# Patient Record
Sex: FEMALE | Race: BLACK OR AFRICAN AMERICAN | Employment: OTHER | URBAN - METROPOLITAN AREA
[De-identification: names, ages, dates, MRNs, and addresses within clinical notes are randomized per-mention and may not be internally consistent; named-entity substitution may affect disease eponyms.]

---

## 2017-02-06 ENCOUNTER — HOSPITAL ENCOUNTER (EMERGENCY)
Age: 37
Discharge: HOME | End: 2017-02-06
Attending: EMERGENCY MEDICINE | Admitting: EMERGENCY MEDICINE
Payer: COMMERCIAL

## 2017-02-06 VITALS
BODY MASS INDEX: 42.22 KG/M2 | HEART RATE: 74 BPM | SYSTOLIC BLOOD PRESSURE: 128 MMHG | WEIGHT: 269 LBS | DIASTOLIC BLOOD PRESSURE: 74 MMHG | TEMPERATURE: 98 F | OXYGEN SATURATION: 100 % | RESPIRATION RATE: 20 BRPM | HEIGHT: 67 IN

## 2017-02-06 DIAGNOSIS — M54.50 ACUTE BILATERAL LOW BACK PAIN WITHOUT SCIATICA: Primary | ICD-10-CM

## 2017-02-06 LAB
APPEARANCE UR: NORMAL
BILIRUB UR QL: NEGATIVE
COLOR UR: YELLOW
GLUCOSE UR STRIP.AUTO-MCNC: NEGATIVE MG/DL
HCG UR QL: NEGATIVE
HGB UR QL STRIP: NEGATIVE
KETONES UR QL STRIP.AUTO: NEGATIVE MG/DL
LEUKOCYTE ESTERASE UR QL STRIP.AUTO: NEGATIVE
NITRITE UR QL STRIP.AUTO: NEGATIVE
PH UR STRIP: 7.5 [PH] (ref 5–8)
PROT UR STRIP-MCNC: NEGATIVE MG/DL
SP GR UR REFRACTOMETRY: 1.02 (ref 1–1.03)
UROBILINOGEN UR QL STRIP.AUTO: 1 EU/DL (ref 0.2–1)

## 2017-02-06 PROCEDURE — 99283 EMERGENCY DEPT VISIT LOW MDM: CPT

## 2017-02-06 PROCEDURE — 81025 URINE PREGNANCY TEST: CPT | Performed by: PHYSICIAN ASSISTANT

## 2017-02-06 PROCEDURE — 81003 URINALYSIS AUTO W/O SCOPE: CPT | Performed by: PHYSICIAN ASSISTANT

## 2017-02-06 RX ORDER — IBUPROFEN 800 MG/1
800 TABLET ORAL
Qty: 20 TAB | Refills: 0 | Status: SHIPPED | OUTPATIENT
Start: 2017-02-06 | End: 2017-02-13

## 2017-02-06 RX ORDER — TRAMADOL HYDROCHLORIDE 50 MG/1
50 TABLET ORAL
Qty: 10 TAB | Refills: 0 | Status: SHIPPED | OUTPATIENT
Start: 2017-02-06 | End: 2017-06-20 | Stop reason: SDUPTHER

## 2017-02-06 NOTE — ED PROVIDER NOTES
HPI Comments: 46yo F c/o back pain x 3 days. Back pain is bilateral lower back. Patient denies incontinence, numbness in the groin, weakness/ numbness in the lower extremities. .    No dysuria, hematuria. She is urinating more frequently. She denies abdominal pain. She denies trauma to her lower back. She stands for long periods at work and dose occasional heavy lifting. She denies fever. Patient is a 39 y.o. female presenting with back pain. Back Pain    Pertinent negatives include no chest pain, no fever, no abdominal pain and no dysuria. History reviewed. No pertinent past medical history. Past Surgical History:   Procedure Laterality Date    Hx back surgery       removed abscess         Family History:   Problem Relation Age of Onset    Stroke Mother 54    Hypertension Brother     Hypertension Mother     High Cholesterol Mother        Social History     Social History    Marital status: SINGLE     Spouse name: N/A    Number of children: N/A    Years of education: N/A     Occupational History    Not on file. Social History Main Topics    Smoking status: Never Smoker    Smokeless tobacco: Never Used    Alcohol use No    Drug use: No    Sexual activity: Yes     Partners: Male     Birth control/ protection: None     Other Topics Concern    Caffeine Concern Yes     daily 12oz    Sleep Concern No    Stress Concern No    Weight Concern No    Exercise No    Seat Belt No    Self-Exams Yes     Social History Narrative         ALLERGIES: Review of patient's allergies indicates no known allergies. Review of Systems   Constitutional: Negative for fever. HENT: Negative for facial swelling. Eyes: Negative for visual disturbance. Respiratory: Negative for shortness of breath. Cardiovascular: Negative for chest pain. Gastrointestinal: Negative for abdominal pain. Genitourinary: Positive for frequency. Negative for dysuria. Musculoskeletal: Positive for back pain. Negative for neck pain. Skin: Negative for rash. Neurological: Negative for dizziness. Psychiatric/Behavioral: Negative for confusion. All other systems reviewed and are negative. Vitals:    02/06/17 1205   BP: 128/74   Pulse: 74   Resp: 20   Temp: 98 °F (36.7 °C)   SpO2: 100%   Weight: 122 kg (269 lb)   Height: 5' 7\" (1.702 m)            Physical Exam   Constitutional: She is oriented to person, place, and time. She appears well-developed and well-nourished. No distress. HENT:   Head: Normocephalic and atraumatic. Eyes: Conjunctivae are normal.   Neck: Normal range of motion. Cardiovascular: Normal rate and regular rhythm. Pulmonary/Chest: Effort normal.   Abdominal: She exhibits no distension. Musculoskeletal: Normal range of motion. Lumbar back: She exhibits tenderness. Diffuse tenderness to midline lower lumbar spine and bilateral flanks. Neurological: She is alert and oriented to person, place, and time. No sensory or motor deficits. Strength and sensation equal to bilateral upper and lower extremities. - SLRT bilat   Skin: Skin is warm and dry. She is not diaphoretic. Psychiatric: She has a normal mood and affect. Nursing note and vitals reviewed. MDM  Number of Diagnoses or Management Options  Acute bilateral low back pain without sciatica:   Diagnosis management comments: 44yo F c/o low back pain x 3 days, a/w urinary frequency. bilat flank and low back tenderness. Afebrile. XR not necessary, no fall or trauma. UA negative. HCG negative. Treat pain. Discussed core strengthening exercises. Discussed treatment plan, return precautions, symptomatic relief, and expected time to improvement. All questions answered. Patient is stable for discharge and outpatient management. ED Course       Procedures    Diagnosis:   1.  Acute bilateral low back pain without sciatica          Disposition: home    Follow-up Information     Follow up With Details Comments Contact Info    Mardi Burkitt, MD Schedule an appointment as soon as possible for a visit in 1 week If symptoms do not improve [facility] [address] [facility] [place]       [id number] [facility] EMERGENCY DEPT  Immediately if symptoms worsen [address] [facility]  [phone]          Patient's Medications   Start Taking    IBUPROFEN (MOTRIN) 800 MG TABLET    Take 1 Tab by mouth every six (6) hours as needed for Pain for up to 7 days. TRAMADOL (ULTRAM) 50 MG TABLET    Take 1 Tab by mouth every six (6) hours as needed for Pain. Max Daily Amount: 200 mg. Continue Taking    No medications on file   These Medications have changed    No medications on file   Stop Taking    IBUPROFEN (MOTRIN) 200 MG TABLET    Take  by mouth.      Yogi Payton PA-C

## 2017-02-06 NOTE — ED TRIAGE NOTES
C/O lower back pain x 3 days. Denies injury. Denies urinary symptoms. Pt states that symptoms are worse with movement.

## 2017-02-06 NOTE — DISCHARGE INSTRUCTIONS
Take anti-inflammatories such as tylenol or motrin with food for pain relief. Do not drive while taking narcotics or muscle relaxants. Massage the muscles that are tight and apply heating pads. Ice can help reduce swelling and inflammation. Apply in 20 minute intervals throughout the day. Return to ED for any significant worsening of pain, or neurologic changes such as loss of bowel or bladder control, weakness or numbness in the extremities. If this is a chronic issue, you may want to consider seeing a specialist or pain management physician. Learning About Relief for Back Pain  What is back tension and strain? Back strain happens when you overstretch, or pull, a muscle in your back. You may hurt your back in an accident or when you exercise or lift something. Most back pain will get better with rest and time. You can take care of yourself at home to help your back heal.  What can you do first to relieve back pain? When you first feel back pain, try these steps:  · Walk. Take a short walk (10 to 20 minutes) on a level surface (no slopes, hills, or stairs) every 2 to 3 hours. Walk only distances you can manage without pain, especially leg pain. · Relax. Find a comfortable position for rest. Some people are comfortable on the floor or a medium-firm bed with a small pillow under their head and another under their knees. Some people prefer to lie on their side with a pillow between their knees. Don't stay in one position for too long. · Try heat or ice. Try using a heating pad on a low or medium setting, or take a warm shower, for 15 to 20 minutes every 2 to 3 hours. Or you can buy single-use heat wraps that last up to 8 hours. You can also try an ice pack for 10 to 15 minutes every 2 to 3 hours. You can use an ice pack or a bag of frozen vegetables wrapped in a thin towel. There is not strong evidence that either heat or ice will help, but you can try them to see if they help.  You may also want to try switching between heat and cold. · Take pain medicine exactly as directed. ¨ If the doctor gave you a prescription medicine for pain, take it as prescribed. ¨ If you are not taking a prescription pain medicine, ask your doctor if you can take an over-the-counter medicine. What else can you do? · Stretch and exercise. Exercises that increase flexibility may relieve your pain and make it easier for your muscles to keep your spine in a good, neutral position. And don't forget to keep walking. · Do self-massage. You can use self-massage to unwind after work or school or to energize yourself in the morning. You can easily massage your feet, hands, or neck. Self-massage works best if you are in comfortable clothes and are sitting or lying in a comfortable position. Use oil or lotion to massage bare skin. · Reduce stress. Back pain can lead to a vicious [place]: Distress about the pain tenses the muscles in your back, which in turn causes more pain. Learn how to relax your mind and your muscles to lower your stress. Where can you learn more? Go to http://suzan-kinga.info/. Enter L083 in the search box to learn more about \"Learning About Relief for Back Pain. \"  Current as of: May 23, 2016  Content Version: 11.1  © 8942-6566 [employer]. Care instructions adapted under license by Marco Vasco (which disclaims liability or warranty for this information). If you have questions about a medical condition or this instruction, always ask your healthcare professional. [place] [address] any warranty or liability for your use of this information. Back Stretches: Exercises  Your Care Instructions  Here are some examples of exercises for stretching your back. Start each exercise slowly. Ease off the exercise if you start to have pain.   Your doctor or physical therapist will tell you when you can start these exercises and which ones will work best for you.  How to do the exercises  Overhead stretch    1. Stand comfortably with your feet shoulder-width apart. 2. Looking straight ahead, raise both arms over your head and reach toward the ceiling. Do not allow your head to tilt back. 3. Hold for 15 to 30 seconds, then lower your arms to your sides. 4. Repeat 2 to 4 times. Side stretch    1. Stand comfortably with your feet shoulder-width apart. 2. Raise one arm over your head, and then lean to the other side. 3. Slide your hand down your leg as you let the weight of your arm gently stretch your side muscles. Hold for 15 to 30 seconds. 4. Repeat 2 to 4 times on each side. Press-up    1. Lie on your stomach, supporting your body with your forearms. 2. Press your elbows down into the floor to raise your upper back. As you do this, relax your stomach muscles and allow your back to arch without using your back muscles. As your press up, do not let your hips or pelvis come off the floor. 3. Hold for 15 to 30 seconds, then relax. 4. Repeat 2 to 4 times. Relax and rest    1. Lie on your back with a rolled towel under your neck and a pillow under your knees. Extend your arms comfortably to your sides. 2. Relax and breathe normally. 3. Remain in this position for about 10 minutes. 4. If you can, do this 2 or 3 times each day. Follow-up care is a key part of your treatment and safety. Be sure to make and go to all appointments, and call your doctor if you are having problems. It's also a good idea to know your test results and keep a list of the medicines you take. Where can you learn more? Go to http://suzan-kinga.info/. Enter G118 in the search box to learn more about \"Back Stretches: Exercises. \"  Current as of: May 23, 2016  Content Version: 11.1  © 1653-9125 [employer]. Care instructions adapted under license by [employer] (which disclaims liability or warranty for this information).  If you have questions about a medical condition or this instruction, always ask your healthcare professional. [place] [address] any warranty or liability for your use of this information.

## 2017-02-06 NOTE — ED NOTES
I have reviewed discharge instructions with the patient. The patient verbalized understanding. Current Discharge Medication List      START taking these medications    Details   traMADol (ULTRAM) 50 mg tablet Take 1 Tab by mouth every six (6) hours as needed for Pain. Max Daily Amount: 200 mg. Qty: 10 Tab, Refills: 0      ibuprofen (MOTRIN) 800 mg tablet Take 1 Tab by mouth every six (6) hours as needed for Pain for up to 7 days.   Qty: 20 Tab, Refills: 0         Patient armband removed and shredded

## 2017-02-06 NOTE — ED NOTES
Pt ambulatory to bathroom for urine specimen. Pt given instructions regarding how to give a clean catch specimen and given wipes with sterile urine cup. Pt verbalized understanding.

## 2017-03-06 ENCOUNTER — OFFICE VISIT (OUTPATIENT)
Dept: FAMILY MEDICINE CLINIC | Age: 37
End: 2017-03-06

## 2017-03-06 VITALS
TEMPERATURE: 98.4 F | HEIGHT: 67 IN | SYSTOLIC BLOOD PRESSURE: 118 MMHG | BODY MASS INDEX: 43.63 KG/M2 | RESPIRATION RATE: 18 BRPM | OXYGEN SATURATION: 98 % | DIASTOLIC BLOOD PRESSURE: 86 MMHG | WEIGHT: 278 LBS | HEART RATE: 82 BPM

## 2017-03-06 DIAGNOSIS — M54.50 ACUTE BILATERAL LOW BACK PAIN WITHOUT SCIATICA: Primary | ICD-10-CM

## 2017-03-06 LAB
BILIRUB UR QL STRIP: NEGATIVE
GLUCOSE UR-MCNC: NEGATIVE MG/DL
KETONES P FAST UR STRIP-MCNC: NEGATIVE MG/DL
PH UR STRIP: 6.5 [PH] (ref 4.6–8)
PROT UR QL STRIP: NEGATIVE MG/DL
SP GR UR STRIP: 1.02 (ref 1–1.03)
UA UROBILINOGEN AMB POC: NORMAL (ref 0.2–1)
URINALYSIS CLARITY POC: CLEAR
URINALYSIS COLOR POC: YELLOW
URINE BLOOD POC: NEGATIVE
URINE LEUKOCYTES POC: NEGATIVE
URINE NITRITES POC: NEGATIVE

## 2017-03-06 RX ORDER — CYCLOBENZAPRINE HCL 10 MG
10 TABLET ORAL 2 TIMES DAILY
Qty: 30 TAB | Refills: 0 | Status: SHIPPED | OUTPATIENT
Start: 2017-03-06 | End: 2017-06-01 | Stop reason: ALTCHOICE

## 2017-03-06 RX ORDER — IBUPROFEN 800 MG/1
800 TABLET ORAL
Qty: 30 TAB | Refills: 0 | Status: SHIPPED | OUTPATIENT
Start: 2017-03-06 | End: 2017-06-01 | Stop reason: ALTCHOICE

## 2017-03-06 NOTE — PROGRESS NOTES
Chief Complaint   Patient presents with    LOW BACK PAIN     rate 4 x 1 month    Leg Pain       HPI:  Patient is a 39year old [facility] American female presents today for an acute visit with low back pain. Low back pain is mostly in the midline and started a little over one month ago with radiation to the left thigh. She denies recent fall, trauma, or injury to her lower back and was seen at [facility] ED on 2/6/17 and was evaluated and given Ibuprofen 800 mg and Tramadol. She has not taken Tramadol recently as it causes dizziness but low back pain has improved taking Ibuprofen 800 mg as needed. 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 UA checked at her recent ED visit and in the office today was negative         No past medical history on file. No Known Allergies    Current Outpatient Prescriptions   Medication Sig Dispense Refill    cyclobenzaprine (FLEXERIL) 10 mg tablet Take 1 Tab by mouth two (2) times a day. 30 Tab 0    ibuprofen (MOTRIN) 800 mg tablet Take 1 Tab by mouth every six (6) hours as needed for Pain. 30 Tab 0    traMADol (ULTRAM) 50 mg tablet Take 1 Tab by mouth every six (6) hours as needed for Pain.  Max Daily Amount: 200 mg. 10 Tab 0          ROS:  Pertinent as in HPI        Physical Exam:  Visit Vitals    /86 (BP 1 Location: Left arm, BP Patient Position: Sitting)    Pulse 82    Temp 98.4 °F (36.9 °C) (Oral)    Resp 18    Ht 5' 7\" (1.702 m)    Wt 278 lb (126.1 kg)    LMP 02/20/2017    SpO2 98%    BMI 43.54 kg/m2     General: a & o x 3, afebrile, well-nourished, interacting appropriately, in no acute distress  Respiratory: symmetrical chest expansion, lung sounds clear bilaterally, good air entry, good respiratory effort,  Cardiovascular: normal S1S2, regular rate and rhythm, no murmurs  Abdomen: non-distended, normoactive bowel sounds x 4 quadrants, soft, non-tender to palpation, no guarding or rigidity, and no CVA tenderness  Musculoskeletal: Mild tenderness mid lower back with negative SLR noted        Assessment/Plan:    ICD-10-CM ICD-9-CM    1. Acute bilateral low back pain without sciatica M54.5 724.2 Ibuprofen 800 mg as needed and Flexeril given today. Of note, UA done today was negative  AMB POC URINALYSIS DIP STICK AUTO W/ MICRO     338.19 cyclobenzaprine (FLEXERIL) 10 mg tablet      ibuprofen (MOTRIN) 800 mg tablet         Orders Placed This Encounter    AMB POC URINALYSIS DIP STICK AUTO W/ MICRO    cyclobenzaprine (FLEXERIL) 10 mg tablet     Sig: Take 1 Tab by mouth two (2) times a day. Dispense:  30 Tab     Refill:  0    ibuprofen (MOTRIN) 800 mg tablet     Sig: Take 1 Tab by mouth every six (6) hours as needed for Pain. Dispense:  30 Tab     Refill:  0         Additional Notes: Discussed today's diagnosis, treatment plans. Discussed medication indications and side effects. After Visit Summary: Discussed provided printed patient instructions. Answered questions accordingly.   Follow-up Disposition: As previously scheduled with PCP        Mariola Valentin DO, MPH  Internal Medicine

## 2017-03-06 NOTE — PATIENT INSTRUCTIONS
Back Pain: Care Instructions  Your Care Instructions    Back pain has many possible causes. It is often related to problems with muscles and ligaments of the back. It may also be related to problems with the nerves, discs, or bones of the back. Moving, lifting, standing, sitting, or sleeping in an awkward way can strain the back. Sometimes you don't notice the injury until later. Arthritis is another common cause of back pain. Although it may hurt a lot, back pain usually improves on its own within several weeks. Most people recover in 12 weeks or less. Using good home treatment and being careful not to stress your back can help you feel better sooner. Follow-up care is a key part of your treatment and safety. Be sure to make and go to all appointments, and call your doctor if you are having problems. Its also a good idea to know your test results and keep a list of the medicines you take. How can you care for yourself at home? · Sit or lie in positions that are most comfortable and reduce your pain. Try one of these positions when you lie down:  ¨ Lie on your back with your knees bent and supported by large pillows. ¨ Lie on the floor with your legs on the seat of a sofa or chair. Kris Bahamian on your side with your knees and hips bent and a pillow between your legs. ¨ Lie on your stomach if it does not make pain worse. · Do not sit up in bed, and avoid soft couches and twisted positions. Bed rest can help relieve pain at first, but it delays healing. Avoid bed rest after the first day of back pain. · Change positions every 30 minutes. If you must sit for long periods of time, take breaks from sitting. Get up and walk around, or lie in a comfortable position. · Try using a heating pad on a low or medium setting for 15 to 20 minutes every 2 or 3 hours. Try a warm shower in place of one session with the heating pad. · You can also try an ice pack for 10 to 15 minutes every 2 to 3 hours.  Put a thin cloth between the ice pack and your skin. · Take pain medicines exactly as directed. ¨ If the doctor gave you a prescription medicine for pain, take it as prescribed. ¨ If you are not taking a prescription pain medicine, ask your doctor if you can take an over-the-counter medicine. · Take short walks several times a day. You can start with 5 to 10 minutes, 3 or 4 times a day, and work up to longer walks. Walk on level surfaces and avoid hills and stairs until your back is better. · Return to work and other activities as soon as you can. Continued rest without activity is usually not good for your back. · To prevent future back pain, do exercises to stretch and strengthen your back and stomach. Learn how to use good posture, safe lifting techniques, and proper body mechanics. When should you call for help? Call your doctor now or seek immediate medical care if:  · You have new or worsening numbness in your legs. · You have new or worsening weakness in your legs. (This could make it hard to stand up.)  · You lose control of your bladder or bowels. Watch closely for changes in your health, and be sure to contact your doctor if:  · Your pain gets worse. · You are not getting better after 2 weeks. Where can you learn more? Go to http://suzan-kinga.info/. Enter I212 in the search box to learn more about \"Back Pain: Care Instructions. \"  Current as of: May 23, 2016  Content Version: 11.1  © 5224-2310 [employer]. Care instructions adapted under license by [employer] (which disclaims liability or warranty for this information). If you have questions about a medical condition or this instruction, always ask your healthcare professional. [address] any warranty or liability for your use of this information.

## 2017-03-06 NOTE — PROGRESS NOTES
Chief Complaint   Patient presents with    LOW BACK PAIN     rate 4 x 1 month    Leg Pain     Patient is here with the complaints listed above x 1 month. Denied urinary symptoms. Pain worsens with bending and moving. Alleviate by laying on her side. Rated at a 4 today. 1. Have you been to the ER, urgent care clinic since your last visit? Hospitalized since your last visit? Yes [facility] for back pain    2. Have you seen or consulted any other health care providers outside of the [address] [place] [address] since your last visit? Include any pap smears or colon screening.  No

## 2017-05-31 VITALS
HEART RATE: 64 BPM | RESPIRATION RATE: 18 BRPM | DIASTOLIC BLOOD PRESSURE: 67 MMHG | SYSTOLIC BLOOD PRESSURE: 123 MMHG | TEMPERATURE: 98.2 F | WEIGHT: 270 LBS | OXYGEN SATURATION: 99 % | BODY MASS INDEX: 42.29 KG/M2

## 2017-05-31 PROCEDURE — 75810000275 HC EMERGENCY DEPT VISIT NO LEVEL OF CARE

## 2017-06-01 ENCOUNTER — HOSPITAL ENCOUNTER (EMERGENCY)
Age: 37
Discharge: LEFT BEFORE DISCHARGE | End: 2017-06-01
Attending: EMERGENCY MEDICINE
Payer: COMMERCIAL

## 2017-06-01 ENCOUNTER — OFFICE VISIT (OUTPATIENT)
Dept: FAMILY MEDICINE CLINIC | Age: 37
End: 2017-06-01

## 2017-06-01 ENCOUNTER — DOCUMENTATION ONLY (OUTPATIENT)
Dept: FAMILY MEDICINE CLINIC | Age: 37
End: 2017-06-01

## 2017-06-01 VITALS
RESPIRATION RATE: 20 BRPM | OXYGEN SATURATION: 98 % | HEART RATE: 76 BPM | TEMPERATURE: 98.9 F | DIASTOLIC BLOOD PRESSURE: 76 MMHG | BODY MASS INDEX: 43.16 KG/M2 | WEIGHT: 275 LBS | HEIGHT: 67 IN | SYSTOLIC BLOOD PRESSURE: 112 MMHG

## 2017-06-01 DIAGNOSIS — M54.5 LOW BACK PAIN, UNSPECIFIED BACK PAIN LATERALITY, UNSPECIFIED CHRONICITY, WITH SCIATICA PRESENCE UNSPECIFIED: Primary | ICD-10-CM

## 2017-06-01 DIAGNOSIS — Z53.21 PATIENT LEFT WITHOUT BEING SEEN: Primary | ICD-10-CM

## 2017-06-01 LAB
BILIRUB UR QL STRIP: NEGATIVE
GLUCOSE UR-MCNC: NEGATIVE MG/DL
KETONES P FAST UR STRIP-MCNC: NEGATIVE MG/DL
PH UR STRIP: 5.5 [PH] (ref 4.6–8)
PROT UR QL STRIP: NEGATIVE MG/DL
SP GR UR STRIP: 1.03 (ref 1–1.03)
UA UROBILINOGEN AMB POC: NORMAL (ref 0.2–1)
URINALYSIS CLARITY POC: NORMAL
URINALYSIS COLOR POC: YELLOW
URINE BLOOD POC: NEGATIVE
URINE LEUKOCYTES POC: NEGATIVE
URINE NITRITES POC: NEGATIVE

## 2017-06-01 RX ORDER — ORPHENADRINE CITRATE 100 MG/1
100 TABLET, EXTENDED RELEASE ORAL 2 TIMES DAILY
Qty: 30 TAB | Refills: 1 | Status: SHIPPED | OUTPATIENT
Start: 2017-06-01 | End: 2017-06-16

## 2017-06-01 RX ORDER — NAPROXEN SODIUM 550 MG/1
550 TABLET ORAL 2 TIMES DAILY WITH MEALS
Qty: 30 TAB | Refills: 1 | Status: SHIPPED | OUTPATIENT
Start: 2017-06-01 | End: 2017-06-16

## 2017-06-01 NOTE — LETTER
[address] [place] [address] [place] [id number] [phone] Work/School Note Date: 6/1/2017 To Whom It May concern: 
 
Claudia Silva was seen and treated today in the office by the following Glynn Ward NP. Wilman Urias Please excuse her abscence from work 5-30-17 and 5-31-17 Sincerely, Glynn Ward NP

## 2017-06-01 NOTE — PATIENT INSTRUCTIONS
Learning About How to Have a Healthy Back  What causes back pain? Back pain is often caused by overuse, strain, or injury. For example, people often hurt their backs playing sports or working in the yard, being jolted in a car accident, or lifting something too heavy. Aging plays a part too. Your bones and muscles tend to lose strength as you age, which makes injury more likely. The spongy discs between the bones of the spine (vertebrae) may suffer from wear and tear and no longer provide enough cushion between the bones. A disc that bulges or breaks open (herniated disc) can press on nerves, causing back pain. In some people, back pain is the result of arthritis, broken vertebrae caused by bone loss (osteoporosis), illness, or a spine problem. Although most people have back pain at one time or another, there are steps you can take to make it less likely. How can you have a healthy back? Reduce stress on your back through good posture  Slumping or slouching alone may not cause low back pain. But after the back has been strained or injured, bad posture can make pain worse. · Sleep in a position that maintains your back's normal curves and on a mattress that feels comfortable. Sleep on your side with a pillow between your knees, or sleep on your back with a pillow under your knees. These positions can reduce strain on your back. · Stand and sit up straight. \"Good posture\" generally means your ears, shoulders, and hips are in a straight line. · If you must stand for a long time, put one foot on a stool, ledge, or box. Switch feet every now and then. · Sit in a chair that is low enough to let you place both feet flat on the floor with both knees nearly level with your hips. If your chair or desk is too high, use a footrest to raise your knees. Place a small pillow, a rolled-up towel, or a lumbar roll in the curve of your back if you need extra support.   · Try a kneeling chair, which helps tilt your hips forward. This takes pressure off your lower back. · Try sitting on an exercise ball. It can rock from side to side, which helps keep your back loose. · When driving, keep your knees nearly level with your hips. Sit straight, and drive with both hands on the steering wheel. Your arms should be in a slightly bent position. Reduce stress on your back through careful lifting  · Squat down, bending at the hips and knees only. If you need to, put one knee to the floor and extend your other knee in front of you, bent at a right angle (half kneeling). · Press your chest straight forward. This helps keep your upper back straight while keeping a slight arch in your low back. · Hold the load as close to your body as possible, at the level of your belly button (navel). · Use your feet to change direction, taking small steps. · Lead with your hips as you change direction. Keep your shoulders in line with your hips as you move. · Set down your load carefully, squatting with your knees and hips only. Exercise and stretch your back  · Do some exercise on most days of the week, if your doctor says it is okay. You can walk, run, swim, or cycle. · Stretch your back muscles. Here are a few exercises to try:  Danna Rolling on your back, and gently pull one bent knee to your chest. Put that foot back on the floor, and then pull the other knee to your chest.  ¨ Do pelvic tilts. Lie on your back with your knees bent. Tighten your stomach muscles. Pull your belly button (navel) in and up toward your ribs. You should feel like your back is pressing to the floor and your hips and pelvis are slightly lifting off the floor. Hold for 6 seconds while breathing smoothly. ¨ Sit with your back flat against a wall. · Keep your core muscles strong. The muscles of your back, belly (abdomen), and buttocks support your spine. ¨ Pull in your belly and imagine pulling your navel toward your spine. Hold this for 6 seconds, then relax.  Remember to keep breathing normally as you tense your muscles. ¨ Do curl-ups. Always do them with your knees bent. Keep your low back on the floor, and curl your shoulders toward your knees using a smooth, slow motion. Keep your arms folded across your chest. If this bothers your neck, try putting your hands behind your neck (not your head), with your elbows spread apart. ¨ Lie on your back with your knees bent and your feet flat on the floor. Tighten your belly muscles, and then push with your feet and raise your buttocks up a few inches. Hold this position 6 seconds as you continue to breathe normally, then lower yourself slowly to the floor. Repeat 8 to 12 times. ¨ If you like group exercise, try Pilates or yoga. These classes have poses that strengthen the core muscles. Lead a healthy lifestyle  · Stay at a healthy weight to avoid strain on your back. · Do not smoke. Smoking increases the risk of osteoporosis, which weakens the spine. If you need help quitting, talk to your doctor about stop-smoking programs and medicines. These can increase your chances of quitting for good. Where can you learn more? Go to http://[employer] Interactivekinga.info/. Enter L315 in the search box to learn more about \"Learning About How to Have a Healthy Back. \"  Current as of: May 23, 2016  Content Version: 11.2  © 4706-4427 [employer]. Care instructions adapted under license by [employer] (which disclaims liability or warranty for this information). If you have questions about a medical condition or this instruction, always ask your healthcare professional. [place] [address] any warranty or liability for your use of this information. Back Pain: Care Instructions  Your Care Instructions    Back pain has many possible causes. It is often related to problems with muscles and ligaments of the back. It may also be related to problems with the nerves, discs, or bones of the back.  Moving, lifting, standing, sitting, or sleeping in an awkward way can strain the back. Sometimes you don't notice the injury until later. Arthritis is another common cause of back pain. Although it may hurt a lot, back pain usually improves on its own within several weeks. Most people recover in 12 weeks or less. Using good home treatment and being careful not to stress your back can help you feel better sooner. Follow-up care is a key part of your treatment and safety. Be sure to make and go to all appointments, and call your doctor if you are having problems. Its also a good idea to know your test results and keep a list of the medicines you take. How can you care for yourself at home? · Sit or lie in positions that are most comfortable and reduce your pain. Try one of these positions when you lie down:  ¨ Lie on your back with your knees bent and supported by large pillows. ¨ Lie on the floor with your legs on the seat of a sofa or chair. Candiss Scriver on your side with your knees and hips bent and a pillow between your legs. ¨ Lie on your stomach if it does not make pain worse. · Do not sit up in bed, and avoid soft couches and twisted positions. Bed rest can help relieve pain at first, but it delays healing. Avoid bed rest after the first day of back pain. · Change positions every 30 minutes. If you must sit for long periods of time, take breaks from sitting. Get up and walk around, or lie in a comfortable position. · Try using a heating pad on a low or medium setting for 15 to 20 minutes every 2 or 3 hours. Try a warm shower in place of one session with the heating pad. · You can also try an ice pack for 10 to 15 minutes every 2 to 3 hours. Put a thin cloth between the ice pack and your skin. · Take pain medicines exactly as directed. ¨ If the doctor gave you a prescription medicine for pain, take it as prescribed.   ¨ If you are not taking a prescription pain medicine, ask your doctor if you can take an over-the-counter medicine. · Take short walks several times a day. You can start with 5 to 10 minutes, 3 or 4 times a day, and work up to longer walks. Walk on level surfaces and avoid hills and stairs until your back is better. · Return to work and other activities as soon as you can. Continued rest without activity is usually not good for your back. · To prevent future back pain, do exercises to stretch and strengthen your back and stomach. Learn how to use good posture, safe lifting techniques, and proper body mechanics. When should you call for help? Call your doctor now or seek immediate medical care if:  · You have new or worsening numbness in your legs. · You have new or worsening weakness in your legs. (This could make it hard to stand up.)  · You lose control of your bladder or bowels. Watch closely for changes in your health, and be sure to contact your doctor if:  · Your pain gets worse. · You are not getting better after 2 weeks. Where can you learn more? Go to http://suzan-kinga.info/. Enter X258 in the search box to learn more about \"Back Pain: Care Instructions. \"  Current as of: May 23, 2016  Content Version: 11.2  © 8620-5752 [employer]. Care instructions adapted under license by [employer] (which disclaims liability or warranty for this information). If you have questions about a medical condition or this instruction, always ask your healthcare professional. [place] [address] any warranty or liability for your use of this information. Learning About Relief for Back Pain  What is back tension and strain? Back strain happens when you overstretch, or pull, a muscle in your back. You may hurt your back in an accident or when you exercise or lift something. Most back pain will get better with rest and time. You can take care of yourself at home to help your back heal.  What can you do first to relieve back pain?   When you first feel back pain, try these steps:  · Walk. Take a short walk (10 to 20 minutes) on a level surface (no slopes, hills, or stairs) every 2 to 3 hours. Walk only distances you can manage without pain, especially leg pain. · Relax. Find a comfortable position for rest. Some people are comfortable on the floor or a medium-firm bed with a small pillow under their head and another under their knees. Some people prefer to lie on their side with a pillow between their knees. Don't stay in one position for too long. · Try heat or ice. Try using a heating pad on a low or medium setting, or take a warm shower, for 15 to 20 minutes every 2 to 3 hours. Or you can buy single-use heat wraps that last up to 8 hours. You can also try an ice pack for 10 to 15 minutes every 2 to 3 hours. You can use an ice pack or a bag of frozen vegetables wrapped in a thin towel. There is not strong evidence that either heat or ice will help, but you can try them to see if they help. You may also want to try switching between heat and cold. · Take pain medicine exactly as directed. ¨ If the doctor gave you a prescription medicine for pain, take it as prescribed. ¨ If you are not taking a prescription pain medicine, ask your doctor if you can take an over-the-counter medicine. What else can you do? · Stretch and exercise. Exercises that increase flexibility may relieve your pain and make it easier for your muscles to keep your spine in a good, neutral position. And don't forget to keep walking. · Do self-massage. You can use self-massage to unwind after work or school or to energize yourself in the morning. You can easily massage your feet, hands, or neck. Self-massage works best if you are in comfortable clothes and are sitting or lying in a comfortable position. Use oil or lotion to massage bare skin. · Reduce stress.  Back pain can lead to a vicious [place]: Distress about the pain tenses the muscles in your back, which in turn causes more pain. Learn how to relax your mind and your muscles to lower your stress. Where can you learn more? Go to http://suzan-kinga.info/. Enter Z368 in the search box to learn more about \"Learning About Relief for Back Pain. \"  Current as of: May 23, 2016  Content Version: 11.2  © 9532-5923 [employer]. Care instructions adapted under license by [employer] (which disclaims liability or warranty for this information). If you have questions about a medical condition or this instruction, always ask your healthcare professional. [address] any warranty or liability for your use of this information. Ice and heat therapy. If x-ray is normal and back pain persist or returns, next step is ref to ortho for further eval.    I have discussed with patient and the intended plan as seen in above orders. The patient has received an after visit summary.

## 2017-06-01 NOTE — ED PROVIDER NOTES
HPI     No past medical history on file. Past Surgical History:   Procedure Laterality Date    HX BACK SURGERY      removed abscess         Family History:   Problem Relation Age of Onset    Stroke Mother 54    Hypertension Brother     Hypertension Mother     High Cholesterol Mother        Social History     Social History    Marital status: SINGLE     Spouse name: N/A    Number of children: N/A    Years of education: N/A     Occupational History    Not on file. Social History Main Topics    Smoking status: Never Smoker    Smokeless tobacco: Never Used    Alcohol use No    Drug use: No    Sexual activity: Yes     Partners: Male     Birth control/ protection: None     Other Topics Concern    Caffeine Concern Yes     daily 12oz    Sleep Concern No    Stress Concern No    Weight Concern No    Exercise No    Seat Belt No    Self-Exams Yes     Social History Narrative         ALLERGIES: Review of patient's allergies indicates no known allergies.     Review of Systems    Vitals:    05/31/17 2327   BP: 123/67   Pulse: 64   Resp: 18   Temp: 98.2 °F (36.8 °C)   SpO2: 99%   Weight: 122.5 kg (270 lb)            Physical Exam     MDM  ED Course       Procedures      Left without being seen  Rubin Traylor MD

## 2017-06-01 NOTE — MR AVS SNAPSHOT
Visit Information Date & Time Provider Department Dept. Phone Encounter #  
 6/1/2017  9:30 AM Azam Borrero  [address] [id number] Follow-up Instructions Return if symptoms worsen or fail to improve. Upcoming Health Maintenance Date Due DTaP/Tdap/Td series (1 - Tdap) 10/10/2001 INFLUENZA AGE 9 TO ADULT 8/1/2017 PAP AKA CERVICAL CYTOLOGY 9/9/2018 Allergies as of 6/1/2017  Review Complete On: 6/1/2017 By: Azam Borrero NP No Known Allergies Current Immunizations  Never Reviewed No immunizations on file. Not reviewed this visit You Were Diagnosed With   
  
 Codes Comments Low back pain, unspecified back pain laterality, unspecified chronicity, with sciatica presence unspecified    -  Primary ICD-10-CM: M54.5 ICD-9-CM: 724.2 Vitals BP Pulse Temp Resp Height(growth percentile) Weight(growth percentile) 112/76 (BP 1 Location: Left arm, BP Patient Position: Sitting) 76 98.9 °F (37.2 °C) (Oral) 20 5' 7\" (1.702 m) 275 lb (124.7 kg) LMP SpO2 BMI OB Status Smoking Status 05/01/2017 98% 43.07 kg/m2 Having regular periods Never Smoker BMI and BSA Data Body Mass Index Body Surface Area 43.07 kg/m 2 2.43 m 2 Preferred Pharmacy Pharmacy Name Phone [id number] [address] Your Updated Medication List  
  
   
This list is accurate as of: 6/1/17 10:28 AM.  Always use your most recent med list.  
  
  
  
  
 naproxen sodium 550 mg tablet Commonly known as:  Melida Sol Take 1 Tab by mouth two (2) times daily (with meals) for 15 days. orphenadrine citrate 100 mg sr tablet Commonly known as:  NORFLEX Take 1 Tab by mouth two (2) times a day for 15 days. traMADol 50 mg tablet Commonly known as:  ULTRAM  
Take 1 Tab by mouth every six (6) hours as needed for Pain. Max Daily Amount: 200 mg. Prescriptions Sent to Pharmacy Refills  
 orphenadrine citrate (NORFLEX) 100 mg sr tablet 1 Sig: Take 1 Tab by mouth two (2) times a day for 15 days. Class: Normal  
 Pharmacy: [facility][id number] [facility], [address] [place] [address] Ph #: [phone] Route: Oral  
 naproxen sodium (ANAPROX) 550 mg tablet 1 Sig: Take 1 Tab by mouth two (2) times daily (with meals) for 15 days. Class: Normal  
 Pharmacy: [facility]acy #[id number] [facility], [address] [place] [address] Ph #: [phone] Route: Oral  
  
We Performed the Following AMB POC URINALYSIS DIP STICK AUTO W/O MICRO [57175 CPT(R)] Follow-up Instructions Return if symptoms worsen or fail to improve. To-Do List   
 06/01/2017 Imaging:  XR SPINE LUMBAR BEND/FLEXION EXTENSION Patient Instructions Learning About How to Have a Healthy Back What causes back pain? Back pain is often caused by overuse, strain, or injury. For example, people often hurt their backs playing sports or working in the yard, being jolted in a car accident, or lifting something too heavy. Aging plays a part too. Your bones and muscles tend to lose strength as you age, which makes injury more likely. The spongy discs between the bones of the spine (vertebrae) may suffer from wear and tear and no longer provide enough cushion between the bones. A disc that bulges or breaks open (herniated disc) can press on nerves, causing back pain. In some people, back pain is the result of arthritis, broken vertebrae caused by bone loss (osteoporosis), illness, or a spine problem. Although most people have back pain at one time or another, there are steps you can take to make it less likely. How can you have a healthy back? Reduce stress on your back through good posture Slumping or slouching alone may not cause low back pain. But after the back has been strained or injured, bad posture can make pain worse. · Sleep in a position that maintains your back's normal curves and on a mattress that feels comfortable. Sleep on your side with a pillow between your knees, or sleep on your back with a pillow under your knees. These positions can reduce strain on your back. · Stand and sit up straight. \"Good posture\" generally means your ears, shoulders, and hips are in a straight line. · If you must stand for a long time, put one foot on a stool, ledge, or box. Switch feet every now and then. · Sit in a chair that is low enough to let you place both feet flat on the floor with both knees nearly level with your hips. If your chair or desk is too high, use a footrest to raise your knees. Place a small pillow, a rolled-up towel, or a lumbar roll in the curve of your back if you need extra support. · Try a kneeling chair, which helps tilt your hips forward. This takes pressure off your lower back. · Try sitting on an exercise ball. It can rock from side to side, which helps keep your back loose. · When driving, keep your knees nearly level with your hips. Sit straight, and drive with both hands on the steering wheel. Your arms should be in a slightly bent position. Reduce stress on your back through careful lifting · Squat down, bending at the hips and knees only. If you need to, put one knee to the floor and extend your other knee in front of you, bent at a right angle (half kneeling). · Press your chest straight forward. This helps keep your upper back straight while keeping a slight arch in your low back. · Hold the load as close to your body as possible, at the level of your belly button (navel). · Use your feet to change direction, taking small steps. · Lead with your hips as you change direction. Keep your shoulders in line with your hips as you move. · Set down your load carefully, squatting with your knees and hips only. Exercise and stretch your back · Do some exercise on most days of the week, if your doctor says it is okay. You can walk, run, swim, or cycle. · Stretch your back muscles. Here are a few exercises to try: ¨ Lie on your back, and gently pull one bent knee to your chest. Put that foot back on the floor, and then pull the other knee to your chest. 
¨ Do pelvic tilts. Lie on your back with your knees bent. Tighten your stomach muscles. Pull your belly button (navel) in and up toward your ribs. You should feel like your back is pressing to the floor and your hips and pelvis are slightly lifting off the floor. Hold for 6 seconds while breathing smoothly. ¨ Sit with your back flat against a wall. · Keep your core muscles strong. The muscles of your back, belly (abdomen), and buttocks support your spine. ¨ Pull in your belly and imagine pulling your navel toward your spine. Hold this for 6 seconds, then relax. Remember to keep breathing normally as you tense your muscles. ¨ Do curl-ups. Always do them with your knees bent. Keep your low back on the floor, and curl your shoulders toward your knees using a smooth, slow motion. Keep your arms folded across your chest. If this bothers your neck, try putting your hands behind your neck (not your head), with your elbows spread apart. ¨ Lie on your back with your knees bent and your feet flat on the floor. Tighten your belly muscles, and then push with your feet and raise your buttocks up a few inches. Hold this position 6 seconds as you continue to breathe normally, then lower yourself slowly to the floor. Repeat 8 to 12 times. ¨ If you like group exercise, try Pilates or yoga. These classes have poses that strengthen the core muscles. Lead a healthy lifestyle · Stay at a healthy weight to avoid strain on your back. · Do not smoke. Smoking increases the risk of osteoporosis, which weakens the spine.  If you need help quitting, talk to your doctor about stop-smoking programs and medicines. These can increase your chances of quitting for good. Where can you learn more? Go to http://suzan-kinga.info/. Enter L315 in the search box to learn more about \"Learning About How to Have a Healthy Back. \" Current as of: May 23, 2016 Content Version: 11.2 © 5933-3925 MarijuanaStocksIndex.[email or web address]. Care instructions adapted under license by [employer] (which disclaims liability or warranty for this information). If you have questions about a medical condition or this instruction, always ask your healthcare professional. [place] [address] any warranty or liability for your use of this information. Back Pain: Care Instructions Your Care Instructions Back pain has many possible causes. It is often related to problems with muscles and ligaments of the back. It may also be related to problems with the nerves, discs, or bones of the back. Moving, lifting, standing, sitting, or sleeping in an awkward way can strain the back. Sometimes you don't notice the injury until later. Arthritis is another common cause of back pain. Although it may hurt a lot, back pain usually improves on its own within several weeks. Most people recover in 12 weeks or less. Using good home treatment and being careful not to stress your back can help you feel better sooner. Follow-up care is a key part of your treatment and safety. Be sure to make and go to all appointments, and call your doctor if you are having problems. Its also a good idea to know your test results and keep a list of the medicines you take. How can you care for yourself at home? · Sit or lie in positions that are most comfortable and reduce your pain. Try one of these positions when you lie down: ¨ Lie on your back with your knees bent and supported by large pillows. ¨ Lie on the floor with your legs on the seat of a sofa or chair. Durene Ligas on your side with your knees and hips bent and a pillow between your legs. ¨ Lie on your stomach if it does not make pain worse. · Do not sit up in bed, and avoid soft couches and twisted positions. Bed rest can help relieve pain at first, but it delays healing. Avoid bed rest after the first day of back pain. · Change positions every 30 minutes. If you must sit for long periods of time, take breaks from sitting. Get up and walk around, or lie in a comfortable position. · Try using a heating pad on a low or medium setting for 15 to 20 minutes every 2 or 3 hours. Try a warm shower in place of one session with the heating pad. · You can also try an ice pack for 10 to 15 minutes every 2 to 3 hours. Put a thin cloth between the ice pack and your skin. · Take pain medicines exactly as directed. ¨ If the doctor gave you a prescription medicine for pain, take it as prescribed. ¨ If you are not taking a prescription pain medicine, ask your doctor if you can take an over-the-counter medicine. · Take short walks several times a day. You can start with 5 to 10 minutes, 3 or 4 times a day, and work up to longer walks. Walk on level surfaces and avoid hills and stairs until your back is better. · Return to work and other activities as soon as you can. Continued rest without activity is usually not good for your back. · To prevent future back pain, do exercises to stretch and strengthen your back and stomach. Learn how to use good posture, safe lifting techniques, and proper body mechanics. When should you call for help? Call your doctor now or seek immediate medical care if: 
· You have new or worsening numbness in your legs. · You have new or worsening weakness in your legs. (This could make it hard to stand up.) · You lose control of your bladder or bowels. Watch closely for changes in your health, and be sure to contact your doctor if: 
· Your pain gets worse. · You are not getting better after 2 weeks. Where can you learn more? Go to http://suzan-kinga.info/. Enter C292 in the search box to learn more about \"Back Pain: Care Instructions. \" Current as of: May 23, 2016 Content Version: 11.2 © 4243-8203 YellowSchedule. Care instructions adapted under license by [employer] (which disclaims liability or warranty for this information). If you have questions about a medical condition or this instruction, always ask your healthcare professional. [address] any warranty or liability for your use of this information. Learning About Relief for Back Pain What is back tension and strain? Back strain happens when you overstretch, or pull, a muscle in your back. You may hurt your back in an accident or when you exercise or lift something. Most back pain will get better with rest and time. You can take care of yourself at home to help your back heal. 
What can you do first to relieve back pain? When you first feel back pain, try these steps: 
· Walk. Take a short walk (10 to 20 minutes) on a level surface (no slopes, hills, or stairs) every 2 to 3 hours. Walk only distances you can manage without pain, especially leg pain. · Relax. Find a comfortable position for rest. Some people are comfortable on the floor or a medium-firm bed with a small pillow under their head and another under their knees. Some people prefer to lie on their side with a pillow between their knees. Don't stay in one position for too long. · Try heat or ice. Try using a heating pad on a low or medium setting, or take a warm shower, for 15 to 20 minutes every 2 to 3 hours. Or you can buy single-use heat wraps that last up to 8 hours. You can also try an ice pack for 10 to 15 minutes every 2 to 3 hours. You can use an ice pack or a bag of frozen vegetables wrapped in a thin towel.  There is not strong evidence that either heat or ice will help, but you can try them to see if they help. You may also want to try switching between heat and cold. · Take pain medicine exactly as directed. ¨ If the doctor gave you a prescription medicine for pain, take it as prescribed. ¨ If you are not taking a prescription pain medicine, ask your doctor if you can take an over-the-counter medicine. What else can you do? · Stretch and exercise. Exercises that increase flexibility may relieve your pain and make it easier for your muscles to keep your spine in a good, neutral position. And don't forget to keep walking. · Do self-massage. You can use self-massage to unwind after work or school or to energize yourself in the morning. You can easily massage your feet, hands, or neck. Self-massage works best if you are in comfortable clothes and are sitting or lying in a comfortable position. Use oil or lotion to massage bare skin. · Reduce stress. Back pain can lead to a vicious [place]: Distress about the pain tenses the muscles in your back, which in turn causes more pain. Learn how to relax your mind and your muscles to lower your stress. Where can you learn more? Go to http://suzan-kinga.info/. Enter E082 in the search box to learn more about \"Learning About Relief for Back Pain. \" Current as of: May 23, 2016 Content Version: 11.2 © 1978-7864 [employer]un[employer]. Care instructions adapted under license by "[employer] (which disclaims liability or warranty for this information). If you have questions about a medical condition or this instruction, always ask your healthcare professional. [address] any warranty or liability for your use of this information. Ice and heat therapy. If x-ray is normal and back pain persist or returns, next step is ref to ortho for further eval. 
 
I have discussed with patient and the intended plan as seen in above orders. The patient has received an after visit summary. Introducing [facility]! Jean-Pierre Kearney introduces Appia patient portal. Now you can access parts of your medical record, email your doctor's office, and request medication refills online. 1. In your internet browser, go to [email or web address] 2. Click on the First Time User? Click Here link in the Sign In box. You will see the New Member Sign Up page. 3. Enter your Appia Access Code exactly as it appears below. You will not need to use this code after youve completed the sign-up process. If you do not sign up before the expiration date, you must request a new code. · Appia Access Code: [email or web address] Expires: 6/4/2017  2:34 PM 
 
4. Enter the last four digits of your Social Security Number (xxxx) and Date of Birth (mm/dd/yyyy) as indicated and click Submit. You will be taken to the next sign-up page. 5. Create a Appia ID. This will be your Appia login ID and cannot be changed, so think of one that is secure and easy to remember. 6. Create a Appia password. You can change your password at any time. 7. Enter your Password Reset Question and Answer. This can be used at a later time if you forget your password. 8. Enter your e-mail address. You will receive e-mail notification when new information is available in [address] 9. Click Sign Up. You can now view and download portions of your medical record. 10. Click the Download Summary menu link to download a portable copy of your medical information. If you have questions, please visit the Frequently Asked Questions section of the Appia website. Remember, Appia is NOT to be used for urgent needs. For medical emergencies, dial 911. Now available from your iPhone and Android! Please provide this summary of care documentation to your next provider. Your primary care clinician is listed as Phys Other.  If you have any questions after today's visit, please call [phone].

## 2017-06-01 NOTE — PROGRESS NOTES
HISTORY OF PRESENT ILLNESS  Wilman Yeh is a 39 y.o. female. Pt woke up Tuesday morning with back pain. This episode is like the last episode but worse. Pt states she took Ibuprofen which helps states she did not take the flexeril as it makes her tired and dizzy  LOW BACK PAIN   The history is provided by the patient. This is a recurrent problem. The current episode started more than 2 days ago. The problem has been gradually worsening. The symptoms are aggravated by twisting, bending and walking. The symptoms are relieved by medications and position. The treatment provided mild relief. Review of Systems   Constitutional: Negative. Gastrointestinal: Negative. Genitourinary: Negative. Musculoskeletal: Positive for back pain. Neurological: Negative for dizziness and tingling. Physical Exam   Constitutional: She appears well-developed. No distress. Neck: Neck supple. Musculoskeletal: She exhibits tenderness. Lumbar back: She exhibits tenderness, pain and spasm. She exhibits no bony tenderness, no swelling and no edema. Positive SLR bilaterally. ASSESSMENT and PLAN    ICD-10-CM ICD-9-CM    1. Low back pain, unspecified back pain laterality, unspecified chronicity, with sciatica presence unspecified M54.5 724.2 AMB POC URINALYSIS DIP STICK AUTO W/O MICRO      orphenadrine citrate (NORFLEX) 100 mg sr tablet      naproxen sodium (ANAPROX) 550 mg tablet      XR SPINE LUMBAR BEND/FLEXION EXTENSION       Note written for excuse from work 5-30-17 and 5-31-17  Pt given after visit summary. Pt advised ice heat therapy. I have discussed with patient and the intended plan as seen in the above orders.

## 2017-06-01 NOTE — PROGRESS NOTES
FYI: Patient called around 2am stating she was in the ED for back pain, but has been there for 4 hours without being seen and wants to know what to do. This is a flare up of past back pain . Advised to wait for ED evaluation or call office Thursday morning to be seen by provider. Patient agreed with plan.

## 2017-06-02 ENCOUNTER — HOSPITAL ENCOUNTER (OUTPATIENT)
Dept: GENERAL RADIOLOGY | Age: 37
Discharge: HOME | End: 2017-06-02
Payer: COMMERCIAL

## 2017-06-02 DIAGNOSIS — M54.5 LOW BACK PAIN, UNSPECIFIED BACK PAIN LATERALITY, UNSPECIFIED CHRONICITY, WITH SCIATICA PRESENCE UNSPECIFIED: ICD-10-CM

## 2017-06-02 PROCEDURE — 72120 X-RAY BEND ONLY L-S SPINE: CPT

## 2017-06-06 ENCOUNTER — TELEPHONE (OUTPATIENT)
Dept: FAMILY MEDICINE CLINIC | Age: 37
End: 2017-06-06

## 2017-06-06 DIAGNOSIS — M54.50 CHRONIC LOW BACK PAIN WITHOUT SCIATICA, UNSPECIFIED BACK PAIN LATERALITY: Primary | ICD-10-CM

## 2017-06-06 DIAGNOSIS — G89.29 CHRONIC LOW BACK PAIN WITHOUT SCIATICA, UNSPECIFIED BACK PAIN LATERALITY: Primary | ICD-10-CM

## 2017-06-20 DIAGNOSIS — M54.9 CHRONIC BACK PAIN, UNSPECIFIED BACK LOCATION, UNSPECIFIED BACK PAIN LATERALITY: Primary | ICD-10-CM

## 2017-06-20 DIAGNOSIS — G89.29 CHRONIC BACK PAIN, UNSPECIFIED BACK LOCATION, UNSPECIFIED BACK PAIN LATERALITY: Primary | ICD-10-CM

## 2017-06-20 RX ORDER — TRAMADOL HYDROCHLORIDE 50 MG/1
50 TABLET ORAL
Qty: 30 TAB | Refills: 0 | Status: SHIPPED | OUTPATIENT
Start: 2017-06-20 | End: 2018-05-09

## 2017-06-20 NOTE — TELEPHONE ENCOUNTER
Spoke with patient and she states she doesn't have an appointment until July 10th with ortho and also states she hasn't gotten a call from PT. She needs the [facility] from May 30-June 25th. Patient states the back pain is severe at a 7/10 and says the medication isn't working any longer it is a constant throbbing and would like something else called in. I informed patient I would let Jose Ramon know. Patient verbally understood.

## 2017-06-20 NOTE — TELEPHONE ENCOUNTER
Jose Ramon states to tell patient to take tramadol 1 or 2 tabs with 200mg ibuprofen or 325mg tylenol

## 2017-06-20 NOTE — TELEPHONE ENCOUNTER
Spoke with patient and informed her form was complete and faxed. She is to  a copy at . Patient would also like prescription of Tramadol.

## 2017-07-11 ENCOUNTER — OFFICE VISIT (OUTPATIENT)
Dept: ORTHOPEDIC SURGERY | Age: 37
End: 2017-07-11

## 2017-07-11 VITALS
TEMPERATURE: 97.9 F | DIASTOLIC BLOOD PRESSURE: 64 MMHG | RESPIRATION RATE: 18 BRPM | OXYGEN SATURATION: 99 % | SYSTOLIC BLOOD PRESSURE: 123 MMHG | HEART RATE: 72 BPM | BODY MASS INDEX: 43.07 KG/M2 | WEIGHT: 275 LBS

## 2017-07-11 DIAGNOSIS — M62.830 MUSCLE SPASM OF BACK: ICD-10-CM

## 2017-07-11 DIAGNOSIS — G89.29 CHRONIC MIDLINE LOW BACK PAIN WITHOUT SCIATICA: ICD-10-CM

## 2017-07-11 DIAGNOSIS — M54.50 CHRONIC MIDLINE LOW BACK PAIN WITHOUT SCIATICA: ICD-10-CM

## 2017-07-11 DIAGNOSIS — M47.816 LUMBAR FACET ARTHROPATHY: ICD-10-CM

## 2017-07-11 DIAGNOSIS — M51.36 DDD (DEGENERATIVE DISC DISEASE), LUMBAR: Primary | ICD-10-CM

## 2017-07-11 RX ORDER — IBUPROFEN 200 MG
800 TABLET ORAL
COMMUNITY
End: 2018-03-30 | Stop reason: ALTCHOICE

## 2017-07-11 RX ORDER — CYCLOBENZAPRINE HCL 10 MG
10 TABLET ORAL 2 TIMES DAILY
Qty: 60 TAB | Refills: 2 | Status: SHIPPED | OUTPATIENT
Start: 2017-07-11 | End: 2017-08-21 | Stop reason: SDUPTHER

## 2017-07-11 RX ORDER — IBUPROFEN 800 MG/1
TABLET ORAL
Qty: 60 TAB | Refills: 2 | Status: SHIPPED | OUTPATIENT
Start: 2017-07-11 | End: 2017-08-21 | Stop reason: SDUPTHER

## 2017-07-11 NOTE — PROGRESS NOTES
[facility]  [address] [place], [facility] [address]  Phone: [phone]  Fax: [phone]      Cesilia Olivares was seen today for back pain and new patient. Diagnoses and all orders for this visit:    DDD (degenerative disc disease), lumbar  -     ibuprofen (MOTRIN) 800 mg tablet; 1 po bid prn pain  -     REFERRAL TO PHYSICAL THERAPY    Lumbar facet arthropathy (HCC)  -     ibuprofen (MOTRIN) 800 mg tablet; 1 po bid prn pain  -     REFERRAL TO PHYSICAL THERAPY    Muscle spasm of back  -     cyclobenzaprine (FLEXERIL) 10 mg tablet; Take 1 Tab by mouth two (2) times a day. Take as needed for muscle spasm  Indications: MUSCLE SPASM  -     REFERRAL TO PHYSICAL THERAPY    Chronic midline low back pain without sciatica       IMPRESSION AND PLAN:  Mango Cheema is a 39 y.o. female with history of lumbar pain x 2 months. She has experienced intermittent lower back pain over the years that became constant about 2 months ago but denies any pain radiating down the legs. 1) Pt was given information on lumbar arthritis and herniated disc exercises. 2) She was informed of proper lifting mechanics. 3) Pt was referred to physical therapy for pain centralization. 4) She received a refill of ibuprofen 800 mg 1 tab BID prn pain. 5) Pt also received a refill of Flexeril 10 mg 1 tab BID prn muscle spasm. 6) Ms. Trell Sales has a reminder for a \"due or due soon\" health maintenance. I have asked that she contact her primary care provider, Alma Delia Prater NP, for follow-up on this health maintenance. 7)  demonstrated consistency with prescribing. 8) Pt will follow-up in 4-6 weeks. HISTORY OF PRESENT ILLNESS:  Mango Cheema is a 39 y.o. female with history of lumbar pain x 2 months. Pt presents to the office today as a new patient referred by NONI Farrell.  She has experienced intermittent lower back pain over the years that became constant about 2 months ago. Pt denies any pain radiating down the legs, weakness, tripping, or falling. She reports that she frequently bends and lifts. Pt denies ever trying physical therapy. She has been using ibuprofen 800 mg, Flexeril 10 mg, and Ultram 50 mg as prescribed by NONI Peraza. Pt denies any sedation with her medications. Pt at this time desires to proceed with a referral to physical therapy. Pain Scale: 4/10     PCP: Soto Santiago NP      History reviewed. No pertinent past medical history. Social History     Social History    Marital status: SINGLE     Spouse name: N/A    Number of children: N/A    Years of education: N/A     Occupational History    Not on file. Social History Main Topics    Smoking status: Never Smoker    Smokeless tobacco: Never Used    Alcohol use No    Drug use: No    Sexual activity: Yes     Partners: Male     Birth control/ protection: None     Other Topics Concern    Caffeine Concern Yes     daily 12oz    Sleep Concern No    Stress Concern No    Weight Concern No    Exercise No    Seat Belt No    Self-Exams Yes     Social History Narrative       Current Outpatient Prescriptions   Medication Sig Dispense Refill    ibuprofen (MOTRIN) 200 mg tablet Take 800 mg by mouth every eight (8) hours as needed for Pain.  ibuprofen (MOTRIN) 800 mg tablet 1 po bid prn pain 60 Tab 2    cyclobenzaprine (FLEXERIL) 10 mg tablet Take 1 Tab by mouth two (2) times a day. Take as needed for muscle spasm  Indications: MUSCLE SPASM 60 Tab 2    traMADol (ULTRAM) 50 mg tablet Take 1 Tab by mouth every six (6) hours as needed for Pain. Max Daily Amount: 200 mg. 30 Tab 0       No Known Allergies    REVIEW OF SYSTEMS    Constitutional: Negative for fever, chills, or weight change. Respiratory: Negative for cough or shortness of breath. Cardiovascular: Negative for chest pain or palpitations.   Gastrointestinal: Negative for acid reflux, change in bowel habits, or constipation. Genitourinary: Negative for dysuria and flank pain. Musculoskeletal: Positive for lumbar pain. Skin: Negative for rash. Neurological: Negative for headaches, dizziness, or numbness. Endo/Heme/Allergies: Negative for increased bruising. Psychiatric/Behavioral: Negative for difficulty with sleep. PHYSICAL EXAMINATION  Visit Vitals    /64    Pulse 72    Temp 97.9 °F (36.6 °C) (Oral)    Resp 18    Wt 275 lb (124.7 kg)    SpO2 99%    BMI 43.07 kg/m2       Constitutional: Awake, alert, and in no acute distress  HEENT: Normocephalic. Atraumatic. Oropharynx is moist and clear. PERRL. EOMI. Sclerae are nonicteric  Heart: Regular rate and rhythm  Lungs: Clear to auscultation bilaterally  Abdomen: Soft and nontender. Bowel sounds are present  Neurological: 1+ symmetrical DTRs in the upper extremities. 1+ symmetrical DTRs in the lower extremities. Sensation to light touch is intact. Negative Anibal's sign bilaterally. Skin: warm, dry, and intact. Musculoskeletal: Mild decreased right cervical flexion; otherwise good ROM of the cervical spine. Very tight across the upper trapezius. Tenderness to palpation in the lower lumbar region. Moderate pain with extension and axial loading. Worse with forward flexion. No pain with internal or external rotation of her hips. Negative straight leg raise bilaterally. Mild pain with heel walking. No pain with toe walking. Difficulty with the single leg stand on the right.       Biceps  Triceps Deltoids Wrist Ext Wrist Flex Hand Intrin   Right +4/5 +4/5 +4/5 +4/5 +4/5 +4/5   Left +4/5 +4/5 +4/5 +4/5 +4/5 +4/5      Hip Flex  Quads Hamstrings Ankle DF EHL Ankle PF   Right +4/5 +4/5 +4/5 +4/5 +4/5 +4/5   Left +4/5 +4/5 +4/5 +4/5 +4/5 +4/5     IMAGING:    Lumbar x-rays from 06/02/0217 were personally reviewed with the Pt and demonstrated:    Results from Hospital Encounter encounter on 06/02/17   XR SPINE LUMBAR BEND/FLEXION EXTENSION   Narrative Lumbar spine lateral with flexion and extension    HISTORY: Low back pain for 3 to 4 days without radiation into the lower  extremities. COMPARISON: None. FINDINGS:     4 standing lateral views obtained including neutral lateral view, lateral view  in extension and flexion and cone-down lateral view. There is limited change in  patient positioning between the films. Vertebral heights maintained. Moderate  narrowing of the apparent L5-S1 disc space. Normal alignment on the neutral  lateral view. No listhesis on flexion or extension. Impression IMPRESSION:     Moderate narrowing of the apparent L5-S1 disc space. No compression fracture. No listhesis on flexion or extension although there does not appear to be  significant change in patient positioning between the multiple views. Written by Carrie Francisco, as dictated by Jensen Cheatham MD.  I, Dr. Jensen Cheatham confirm that all documentation is accurate.

## 2017-07-11 NOTE — PATIENT INSTRUCTIONS
Low Back Arthritis: Exercises  Your Care Instructions  Here are some examples of typical rehabilitation exercises for your condition. Start each exercise slowly. Ease off the exercise if you start to have pain. Your doctor or physical therapist will tell you when you can start these exercises and which ones will work best for you. When you are not being active, find a comfortable position for rest. Some people are comfortable on the floor or a medium-firm bed with a small pillow under their head and another under their knees. Some people prefer to lie on their side with a pillow between their knees. Don't stay in one position for too long. Take short walks (10 to 20 minutes) every 2 to 3 hours. Avoid slopes, hills, and stairs until you feel better. Walk only distances you can manage without pain, especially leg pain. How to do the exercises  Pelvic tilt    1. Lie on your back with your knees bent. 2. \"Brace\" your stomach--tighten your muscles by pulling in and imagining your belly button moving toward your spine. 3. Press your lower back into the floor. You should feel your hips and pelvis rock back. 4. Hold for 6 seconds while breathing smoothly. 5. Relax and allow your pelvis and hips to rock forward. 6. Repeat 8 to 12 times. Back stretches    1. Get down on your hands and knees on the floor. 2. Relax your head and allow it to droop. Round your back up toward the ceiling until you feel a nice stretch in your upper, middle, and lower back. Hold this stretch for as long as it feels comfortable, or about 15 to 30 seconds. 3. Return to the starting position with a flat back while you are on your hands and knees. 4. Let your back sway by pressing your stomach toward the floor. Lift your buttocks toward the ceiling. 5. Hold this position for 15 to 30 seconds. 6. Repeat 2 to 4 times. Follow-up care is a key part of your treatment and safety.  Be sure to make and go to all appointments, and call your doctor if you are having problems. It's also a good idea to know your test results and keep a list of the medicines you take. Where can you learn more? Go to http://suzan-kinga.info/. Enter E923 in the search box to learn more about \"Low Back Arthritis: Exercises. \"  Current as of: March 21, 2017  Content Version: 11.3  © 7679-8644 [employer]. Care instructions adapted under license by [employer] (which disclaims liability or warranty for this information). If you have questions about a medical condition or this instruction, always ask your healthcare professional. [place] [address] any warranty or liability for your use of this information. Herniated Disc: Exercises  Your Care Instructions  Here are some examples of typical rehabilitation exercises for your condition. Start each exercise slowly. Ease off the exercise if you start to have pain. Your doctor or physical therapist will tell you when you can start these exercises and which ones will work best for you. How to do the exercises  Note: These exercises can help you move easier and feel better. But when you first start doing them, you may have more pain in your back. This is normal. But it is important to pay close attention to your pain during and after each exercise. · Keep doing these exercises if your pain stays the same or moves from your leg and buttock more toward the middle of your spine. Pain moving out of your leg and buttock is a good sign. · Stop doing these exercises if your pain gets worse in your leg and buttock. Stop if you start to have pain in your leg and buttock that you didn't have before. Be sure to do these exercises in the order they appear. Note how your pain changes before you move to the next one. If your pain is much worse right after exercise and stays worse the next day, do not do any of these exercises. 1. Rest on belly    7.  Lie on your stomach, face down, with your head turned to the side. Place your arms beside your body. If this bothers your neck, place your hands, one on top of the other, underneath your forehead. This will help support your head and neck. 8. Try to relax your lower back muscles as much as you can. 9. Continue to lie on your stomach for 2 minutes. 10. If your pain spreads down your leg or increases down your leg, stop this exercise and do not do the next exercises. 2. Press-up    1. Lie on your stomach, face down. Keep your elbows tucked into your sides and under your shoulders. 2. Press your elbows down into the floor to raise your upper back. As you do this, relax your stomach muscles. Allow your back to arch without using your back muscles. Let your low back relax completely as you arch up. 3. Hold this position for 2 minutes. 4. Repeat 2 to 4 times. 5. If your pain spreads down your leg or increases down your leg, stop this exercise and do not do the next exercises. 3. Full press-up    1. Lie on your stomach, face down. Keep your elbows tucked into your sides and under your shoulders. 2. Straighten your elbows, and push your upper body up as far as you can. Allow your lower back to sag. Keep your hips, pelvis, and legs relaxed. 3. Hold this position for 5 seconds, and then relax. 4. Repeat 10 times. Each time, try to raise your upper body a little higher and hold your arms a bit straighter. 5. If your pain spreads down your leg or gets worse down your leg, stop this exercise and do not move to the next exercise. 6. If you can't do this exercise, you may instead try the backward bend exercise that follows. 4. Backward bend    · Stand with your feet hip-width apart. Your toes should point forward. Do not lock your knees. · Place your hands in the small of your back. · Bend backward as far as you can, keeping your knees straight. Hold this position for 2 to 3 seconds. Then return to your starting position.   · Repeat 2 to 4 times. Each time, try to bend backward a little farther, until you bend backward as far as you can. · If your pain spreads down your leg or increases down your leg, stop this exercise. Follow-up care is a key part of your treatment and safety. Be sure to make and go to all appointments, and call your doctor if you are having problems. It's also a good idea to know your test results and keep a list of the medicines you take. Where can you learn more? Go to http://suzan-kinga.info/. Enter 14105 [id number] in the search box to learn more about \"Herniated Disc: Exercises. \"  Current as of: March 21, 2017  Content Version: 11.3  © 3442-1935 [employer]. Care instructions adapted under license by [employer] (which disclaims liability or warranty for this information). If you have questions about a medical condition or this instruction, always ask your healthcare professional. [place] [address] any warranty or liability for your use of this information.

## 2017-07-11 NOTE — MR AVS SNAPSHOT
Visit Information Date & Time Provider Department Dept. Phone Encounter #  
 7/11/2017 10:00 AM Yojana Boggs, 27 [facility] [phone] [id number] Follow-up Instructions Return in about 1 month (around 8/11/2017) for PT follow up, Medication follow up. Upcoming Health Maintenance Date Due DTaP/Tdap/Td series (1 - Tdap) 10/10/2001 INFLUENZA AGE 9 TO ADULT 8/1/2017 PAP AKA CERVICAL CYTOLOGY 9/9/2018 Allergies as of 7/11/2017  Review Complete On: 7/11/2017 By: Yojana Boggs MD  
 No Known Allergies Current Immunizations  Never Reviewed No immunizations on file. Not reviewed this visit You Were Diagnosed With   
  
 Codes Comments DDD (degenerative disc disease), lumbar    -  Primary ICD-10-CM: M51.36 
ICD-9-CM: 722.52 Lumbar facet arthropathy (HCC)     ICD-10-CM: M12.88 ICD-9-CM: 721.3 Muscle spasm of back     ICD-10-CM: L63.850 ICD-9-CM: 724.8 Chronic midline low back pain without sciatica     ICD-10-CM: M54.5, G89.29 ICD-9-CM: 724.2, 338.29 Vitals BP Pulse Temp Resp Weight(growth percentile) SpO2  
 123/64 72 97.9 °F (36.6 °C) (Oral) 18 275 lb (124.7 kg) 99% BMI OB Status Smoking Status 43.07 kg/m2 Having regular periods Never Smoker BMI and BSA Data Body Mass Index Body Surface Area 43.07 kg/m 2 2.43 m 2 Preferred Pharmacy Pharmacy Name Phone [id number] [place], [address] Your Updated Medication List  
  
   
This list is accurate as of: 7/11/17 11:26 AM.  Always use your most recent med list.  
  
  
  
  
 cyclobenzaprine 10 mg tablet Commonly known as:  FLEXERIL Take 1 Tab by mouth two (2) times a day. Take as needed for muscle spasm  Indications: MUSCLE SPASM * ibuprofen 200 mg tablet Commonly known as:  MOTRIN  
 Take 800 mg by mouth every eight (8) hours as needed for Pain. * ibuprofen 800 mg tablet Commonly known as:  MOTRIN  
1 po bid prn pain  
  
 traMADol 50 mg tablet Commonly known as:  ULTRAM  
Take 1 Tab by mouth every six (6) hours as needed for Pain. Max Daily Amount: 200 mg.  
  
 * Notice: This list has 2 medication(s) that are the same as other medications prescribed for you. Read the directions carefully, and ask your doctor or other care provider to review them with you. Prescriptions Sent to Pharmacy Refills  
 ibuprofen (MOTRIN) 800 mg tablet 2 Si po bid prn pain  
 Class: Normal  
 Pharmacy: [id number] [place], [place] - [address] Ph #: [phone]  
 cyclobenzaprine (FLEXERIL) 10 mg tablet 2 Sig: Take 1 Tab by mouth two (2) times a day. Take as needed for muscle spasm  Indications: MUSCLE SPASM Class: Normal  
 Pharmacy: [id number] [address] [facility], [address] [place] [address] Ph #: [phone] Route: Oral  
  
We Performed the Following REFERRAL TO PHYSICAL THERAPY [AHA25 Custom] Comments:  
 Eval/Treat for Lumbar PT, Core Strengthening/Pain Centralization Follow-up Instructions Return in about 1 month (around 2017) for PT follow up, Medication follow up. Referral Information Referral ID Referred By Referred To  
  
 [id number] Pamela Guerra Not Available Visits Status Start Date End Date 1 New Request 17 If your referral has a status of pending review or denied, additional information will be sent to support the outcome of this decision. Patient Instructions Low Back Arthritis: Exercises Your Care Instructions Here are some examples of typical rehabilitation exercises for your condition. Start each exercise slowly. Ease off the exercise if you start to have pain.  
Your doctor or physical therapist will tell you when you can start these exercises and which ones will work best for you. When you are not being active, find a comfortable position for rest. Some people are comfortable on the floor or a medium-firm bed with a small pillow under their head and another under their knees. Some people prefer to lie on their side with a pillow between their knees. Don't stay in one position for too long. Take short walks (10 to 20 minutes) every 2 to 3 hours. Avoid slopes, hills, and stairs until you feel better. Walk only distances you can manage without pain, especially leg pain. How to do the exercises Pelvic tilt 1. Lie on your back with your knees bent. 2. \"Brace\" your stomachtighten your muscles by pulling in and imagining your belly button moving toward your spine. 3. Press your lower back into the floor. You should feel your hips and pelvis rock back. 4. Hold for 6 seconds while breathing smoothly. 5. Relax and allow your pelvis and hips to rock forward. 6. Repeat 8 to 12 times. Back stretches 1. Get down on your hands and knees on the floor. 2. Relax your head and allow it to droop. Round your back up toward the ceiling until you feel a nice stretch in your upper, middle, and lower back. Hold this stretch for as long as it feels comfortable, or about 15 to 30 seconds. 3. Return to the starting position with a flat back while you are on your hands and knees. 4. Let your back sway by pressing your stomach toward the floor. Lift your buttocks toward the ceiling. 5. Hold this position for 15 to 30 seconds. 6. Repeat 2 to 4 times. Follow-up care is a key part of your treatment and safety. Be sure to make and go to all appointments, and call your doctor if you are having problems. It's also a good idea to know your test results and keep a list of the medicines you take. Where can you learn more? Go to http://suzan-kinga.info/. Enter Q438 in the search box to learn more about \"Low Back Arthritis: Exercises. \" 
 Current as of: March 21, 2017 Content Version: 11.3 © 3478-6037 Mosaic. Care instructions adapted under license by SolarEdge (which disclaims liability or warranty for this information). If you have questions about a medical condition or this instruction, always ask your healthcare professional. [address] any warranty or liability for your use of this information. Herniated Disc: Exercises Your Care Instructions Here are some examples of typical rehabilitation exercises for your condition. Start each exercise slowly. Ease off the exercise if you start to have pain. Your doctor or physical therapist will tell you when you can start these exercises and which ones will work best for you. How to do the exercises Note: These exercises can help you move easier and feel better. But when you first start doing them, you may have more pain in your back. This is normal. But it is important to pay close attention to your pain during and after each exercise. · Keep doing these exercises if your pain stays the same or moves from your leg and buttock more toward the middle of your spine. Pain moving out of your leg and buttock is a good sign. · Stop doing these exercises if your pain gets worse in your leg and buttock. Stop if you start to have pain in your leg and buttock that you didn't have before. Be sure to do these exercises in the order they appear. Note how your pain changes before you move to the next one. If your pain is much worse right after exercise and stays worse the next day, do not do any of these exercises. 1. Rest on belly 7. Lie on your stomach, face down, with your head turned to the side. Place your arms beside your body. If this bothers your neck, place your hands, one on top of the other, underneath your forehead. This will help support your head and neck. 8. Try to relax your lower back muscles as much as you can. 9. Continue to lie on your stomach for 2 minutes. 10. If your pain spreads down your leg or increases down your leg, stop this exercise and do not do the next exercises. 2. Press-up 1. Lie on your stomach, face down. Keep your elbows tucked into your sides and under your shoulders. 2. Press your elbows down into the floor to raise your upper back. As you do this, relax your stomach muscles. Allow your back to arch without using your back muscles. Let your low back relax completely as you arch up. 3. Hold this position for 2 minutes. 4. Repeat 2 to 4 times. 5. If your pain spreads down your leg or increases down your leg, stop this exercise and do not do the next exercises. 3. Full press-up 1. Lie on your stomach, face down. Keep your elbows tucked into your sides and under your shoulders. 2. Straighten your elbows, and push your upper body up as far as you can. Allow your lower back to sag. Keep your hips, pelvis, and legs relaxed. 3. Hold this position for 5 seconds, and then relax. 4. Repeat 10 times. Each time, try to raise your upper body a little higher and hold your arms a bit straighter. 5. If your pain spreads down your leg or gets worse down your leg, stop this exercise and do not move to the next exercise. 6. If you can't do this exercise, you may instead try the backward bend exercise that follows. 4. Backward bend · Stand with your feet hip-width apart. Your toes should point forward. Do not lock your knees. · Place your hands in the small of your back. · Bend backward as far as you can, keeping your knees straight. Hold this position for 2 to 3 seconds. Then return to your starting position. · Repeat 2 to 4 times. Each time, try to bend backward a little farther, until you bend backward as far as you can. · If your pain spreads down your leg or increases down your leg, stop this exercise. Follow-up care is a key part of your treatment and safety.  Be sure to make and go to all appointments, and call your doctor if you are having problems. It's also a good idea to know your test results and keep a list of the medicines you take. Where can you learn more? Go to http://suzan-kinga.info/. Enter 12080 [phone] in the search box to learn more about \"Herniated Disc: Exercises. \" Current as of: March 21, 2017 Content Version: 11.3 © 7293-4398 Yeelink. Care instructions adapted under license by ComQi (which disclaims liability or warranty for this information). If you have questions about a medical condition or this instruction, always ask your healthcare professional. [address] any warranty or liability for your use of this information. Introducing [facility]! Nevaeh Deras introduces [employer] patient portal. Now you can access parts of your medical record, email your doctor's office, and request medication refills online. 1. In your internet browser, go to [email or web address] 2. Click on the First Time User? Click Here link in the Sign In box. You will see the New Member Sign Up page. 3. Enter your [employer] Access Code exactly as it appears below. You will not need to use this code after youve completed the sign-up process. If you do not sign up before the expiration date, you must request a new code. · [employer] Access Code: [email or web address] Expires: 10/9/2017 10:29 AM 
 
4. Enter the last four digits of your Social Security Number (xxxx) and Date of Birth (mm/dd/yyyy) as indicated and click Submit. You will be taken to the next sign-up page. 5. Create a Kleot ID. This will be your [employer] login ID and cannot be changed, so think of one that is secure and easy to remember. 6. Create a [employer] password. You can change your password at any time. 7. Enter your Password Reset Question and Answer. This can be used at a later time if you forget your password. 8. Enter your e-mail address. You will receive e-mail notification when new information is available in [address] 9. Click Sign Up. You can now view and download portions of your medical record. 10. Click the Download Summary menu link to download a portable copy of your medical information. If you have questions, please visit the Frequently Asked Questions section of the [employer]cel[employer] website. Remember, [employer] is NOT to be used for urgent needs. For medical emergencies, dial 911. Now available from your iPhone and Android! Please provide this summary of care documentation to your next provider. Your [facility] care clinician [facility]. If you have any questions after today's visit, please call [phone].

## 2017-07-13 ENCOUNTER — HOSPITAL ENCOUNTER (OUTPATIENT)
Dept: PHYSICAL THERAPY | Age: 37
Discharge: HOME | End: 2017-07-13
Payer: COMMERCIAL

## 2017-07-13 PROCEDURE — 97161 PT EVAL LOW COMPLEX 20 MIN: CPT

## 2017-07-13 PROCEDURE — 97110 THERAPEUTIC EXERCISES: CPT

## 2017-07-13 NOTE — PROGRESS NOTES
[facility]  [address], [address] [phone] fax    Plan of Care/ Statement of Necessity for Physical Therapy Services           Patient name: Elza Tang Start of Care: 2017   Referral source: Maryann David MD : 1980    Medical Diagnosis: Low back pain [M54.5]  Other intervertebral disc degeneration, lumbar region [M51.36]  Muscle spasm of back [M62.830]   Onset Date:17    Treatment Diagnosis: low back pain   Prior Hospitalization: see medical history Provider#: [id number]   Medications: Verified on Patient summary List    Comorbidities: OA   Prior Level of Function: 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 Functionally independent prior to injury. Lives with 4 children in 2 story house    The [address] [place] [address] and following information is based on the information from the initial evaluation. Assessment/ key information: Patient is a 39 y. o.female presenting with Low back pain [M54.5]  Other intervertebral disc degeneration, lumbar region [M51.36]  Muscle spasm of back [M62.830]. Ms. Richa Stockton presents to initial PT evaluation with c/o low back pain worsening since 17. She reports pain worsening while lifting at work that night, and awoke the next morning in severe pain. She demonstrates limited lumbar ROM, with hypermobile lumbar spine into extension and surrounding soft tissue restrictions. Lumbar screen negative for referred symptoms, and patient states pain remains localized to lumbar region. Hip & glut strength is limited, with poor attention to squat & lifting form. We will work to progress core stabilization with modalities for pain relief as indicated. Patient will benefit from skilled PT services to address deficits and facilitate return to premorbid activity level and promote improved quality of life.        Evaluation Complexity History LOW Complexity : Zero comorbidities / personal factors that will impact the outcome / POC; Examination LOW Complexity : 1-2 Standardized tests and measures addressing body structure, function, activity limitation and / or participation in recreation  ;Presentation MEDIUM Complexity : Evolving with changing characteristics  ; Clinical Decision Making MEDIUM Complexity : FOTO score of 26-74  Overall Complexity Rating: LOW   Problem List: pain affecting function, decrease ROM, decrease strength, edema affecting function, impaired gait/ balance, decrease ADL/ functional abilitiies and decrease activity tolerance   Treatment Plan may include any combination of the following: Therapeutic exercise, Therapeutic activities, Neuromuscular re-education, Physical agent/modality, Gait/balance training, Manual therapy, Aquatic therapy, Patient education, Self Care training, Functional mobility training, Home safety training and Stair training  Patient / Family readiness to learn indicated by: asking questions, trying to perform skills and interest  Persons(s) to be included in education: patient (P)  Barriers to Learning/Limitations: None  Patient Goal (s): less pain & strength in joints/back.   Patient Self Reported Health Status: good  Rehabilitation Potential: good  Short Term Goals: To be accomplished in 1 weeks:  1. Establish HEP for ROM & Strengthening. Long Term Goals: To be accomplished in 4 weeks:  1. Patient will be independent with HEP for ROM & Strengthening. Eval Status:na  2. Pt will demonstrate safe lifting & squatting mechanics without cuing for carryover to stocking/lifting at work. Eval Status:poor attention to core, flexes at lumbar spine to  objects from floor  3. Pt will increase FOTO score to 70 points to demonstrate increased ease with ADLs. Eval Status: FOTO: 51  4. Pt will increase B hip flex/abd to grossly 4+/5 to improve lumbar mobility & squatting at work.  Eval Status: B hip abd: 3-/5 , B hip flex: 4-/5    Frequency / Duration: Patient to be seen 2 times per week for 4 weeks. Patient/ Caregiver education and instruction: Diagnosis, prognosis, self care, activity modification and exercises   [x]  Plan of care has been reviewed with ANNETTE Calvillo, PT 7/13/2017 3:32 PM    ________________________________________________________________________    I certify that the above Therapy Services are being furnished while the patient is under my care. I agree with the treatment plan and certify that this therapy is necessary.     Physician's Signature:____________________  Date:____________Time: _________    Please sign and return to [facility] [place] [address]  [address]   [facility], [address] [phone] fax

## 2017-07-13 NOTE — MR AVS SNAPSHOT
Visit Information Date & Time Provider Department Dept. Phone Encounter #  
 7/13/2017  3:00 PM Rupert Servin, PT [facility] PT [address] [place] [address]  [phone] [id number] Your Appointments 8/21/2017  9:50 AM  
Follow Up with Sari Baig MD  
[facility] Appt Note: PT/MED F/U  
 [address] [facility] [address]  
[phone]  
  
   
 [address] [facility] [address] Upcoming Health Maintenance Date Due DTaP/Tdap/Td series (1 - Tdap) 10/10/2001 INFLUENZA AGE 9 TO ADULT 8/1/2017 PAP AKA CERVICAL CYTOLOGY 9/9/2018 Allergies as of 7/13/2017  Review Complete On: 7/11/2017 By: Sari Baig MD  
 No Known Allergies Current Immunizations  Never Reviewed No immunizations on file. Not reviewed this visit Vitals OB Status Smoking Status Having regular periods Never Smoker Your Updated Medication List  
  
ASK your doctor about these medications   
 cyclobenzaprine 10 mg tablet Commonly known as:  FLEXERIL Take 1 Tab by mouth two (2) times a day. Take as needed for muscle spasm  Indications: MUSCLE SPASM * ibuprofen 200 mg tablet Commonly known as:  MOTRIN Take 800 mg by mouth every eight (8) hours as needed for Pain. * ibuprofen 800 mg tablet Commonly known as:  MOTRIN  
1 po bid prn pain  
  
 traMADol 50 mg tablet Commonly known as:  ULTRAM  
Take 1 Tab by mouth every six (6) hours as needed for Pain. Max Daily Amount: 200 mg.  
  
 * Notice: This list has 2 medication(s) that are the same as other medications prescribed for you. Read the directions carefully, and ask your doctor or other care provider to review them with you. To-Do List   
 07/13/2017  3:00 PM  
  Appointment with Rupert Servin PT at [facility]  [facility] ([phone]) Introducing [facility]! Juan David Willett introduces [employer] patient portal. Now you can access parts of your medical record, email your doctor's office, and request medication refills online. 1. In your internet browser, go to [email or web address] 2. Click on the First Time User? Click Here link in the Sign In box. You will see the New Member Sign Up page. 3. Enter your [employer] Access Code exactly as it appears below. You will not need to use this code after youve completed the sign-up process. If you do not sign up before the expiration date, you must request a new code. · [employer] Access Code: [email or web address] Expires: 10/9/2017 10:29 AM 
 
4. Enter the last four digits of your Social Security Number (xxxx) and Date of Birth (mm/dd/yyyy) as indicated and click Submit. You will be taken to the next sign-up page. 5. Create a [employer] ID. This will be your [employer] login ID and cannot be changed, so think of one that is secure and easy to remember. 6. Create a [employer] password. You can change your password at any time. 7. Enter your Password Reset Question and Answer. This can be used at a later time if you forget your password. 8. Enter your e-mail address. You will receive e-mail notification when new information is available in [address] 9. Click Sign Up. You can now view and download portions of your medical record. 10. Click the Download Summary menu link to download a portable copy of your medical information. If you have questions, please visit the Frequently Asked Questions section of the [employer] website. Remember, [employer] is NOT to be used for urgent needs. For medical emergencies, dial 911. Now available from your iPhone and Android! Please provide this summary of care documentation to your next provider. Your primary care clinician is listed as Yovani Rios. If you have any questions after today's visit, please call [phone].

## 2017-07-13 NOTE — PROGRESS NOTES
PT DAILY TREATMENT NOTE 8    Patient Name: Nayely Cowan  Date:2017  : 1980  [x]  Patient  Verified  Payor: BLUE CROSS / Plan: Taylor Felix [id number] PPO / Product Type: PPO /    In time:300  Out time:340  Total Treatment Time (min): 40  Visit #: 1 of 8    Treatment Area: Low back pain [M54.5]  Other intervertebral disc degeneration, lumbar region [M51.36]  Muscle spasm of back [M62.830]    SUBJECTIVE  Pain Level (0-10 scale): 5  Any medication changes, allergies to medications, adverse drug reactions, diagnosis change, or new procedure performed?: [x] No    [] Yes (see summary sheet for update)  Subjective functional status/changes:   [x] See Eval form in paper chart     OBJECTIVE      32 min [x]Eval                  []Re-Eval         8 min Therapeutic Exercise:  [x] See flow sheet :HEP   Rationale: increase ROM, increase strength, improve coordination, improve balance and increase proprioception to improve the patients ability to perform ADLs. With   [] TE   [] TA   [] neuro   [] other: Patient Education: [x] Review HEP    [] Progressed/Changed HEP based on:   [] positioning   [] body mechanics   [] transfers   [] heat/ice application    [] other:           Pain Level (0-10 scale) post treatment: 5    ASSESSMENT:   [x]  See Evaluation          Goals:  Short Term Goals: To be accomplished in 1 weeks:  1. Establish HEP for ROM & Strengthening.     Long Term Goals: To be accomplished in 4 weeks:  1. Patient will be independent with HEP for ROM & Strengthening. Eval Status:na  2. Pt will demonstrate safe lifting & squatting mechanics without cuing for carryover to stocking/lifting at work. Eval Status:poor attention to core, flexes at lumbar spine to  objects from floor  3. Pt will increase FOTO score to 70 points to demonstrate increased ease with ADLs. Eval Status: FOTO: 51  4.  Pt will increase B hip flex/abd to grossly 4+/5 to improve lumbar mobility & squatting at work.              Eval Status: B hip abd: 3-/5 , B hip flex: 4-/5    PLAN      [x]  Continue plan of care           Amber Montes, PT 7/13/2017  3:40 PM

## 2017-07-18 ENCOUNTER — DOCUMENTATION ONLY (OUTPATIENT)
Dept: ORTHOPEDIC SURGERY | Age: 37
End: 2017-07-18

## 2017-07-18 NOTE — PROGRESS NOTES
fmla form was received from [facility], instructed patient that we will call when completed, may take 7-10 business days.

## 2017-07-19 ENCOUNTER — DOCUMENTATION ONLY (OUTPATIENT)
Dept: ORTHOPEDIC SURGERY | Age: 37
End: 2017-07-19

## 2017-07-19 ENCOUNTER — HOSPITAL ENCOUNTER (OUTPATIENT)
Dept: PHYSICAL THERAPY | Age: 37
Discharge: HOME | End: 2017-07-19
Payer: COMMERCIAL

## 2017-07-19 PROCEDURE — 97014 ELECTRIC STIMULATION THERAPY: CPT

## 2017-07-19 PROCEDURE — 97112 NEUROMUSCULAR REEDUCATION: CPT

## 2017-07-19 PROCEDURE — 97110 THERAPEUTIC EXERCISES: CPT

## 2017-07-19 NOTE — PROGRESS NOTES
PT DAILY TREATMENT NOTE 1216    Patient Name: Felisa Egan  Date:2017  : 1980  [x]  Patient  Verified  Payor: BLUE [employer] / Plan: Taylor Felix [id number] PPO / Product Type: PPO /    In time:830  Out time:915  Total Treatment Time (min): 45  Visit #: 2 of 8    Treatment Area: Low back pain [M54.5]  Other intervertebral disc degeneration, lumbar region [M51.36]  Muscle spasm of back [M62.830]    SUBJECTIVE  Pain Level (0-10 scale): 4  Any medication changes, allergies to medications, adverse drug reactions, diagnosis change, or new procedure performed?: [x] No    [] Yes (see summary sheet for update)  Subjective functional status/changes:   [] No changes reported  \"I'm doing ok, I just got off work. \"    OBJECTIVE    Modality rationale: decrease pain and increase tissue extensibility to improve the patients ability to perform ADLs.     Min Type Additional Details   10 [x] Estim:  [x]Unatt       [x]IFC  []Premod                        []Other:  []w/ice   [x]w/heat  Position: seated  Location:low back    [] Estim: []Att    []TENS instruct  []NMES                    []Other:  []w/US   []w/ice   []w/heat  Position:  Location:    []  Traction: [] Cervical       []Lumbar                       [] Prone          []Supine                       []Intermittent   []Continuous Lbs:  [] before manual  [] after manual    []  Ultrasound: []Continuous   [] Pulsed                           []1MHz   []3MHz W/cm2:  Location:    []  Iontophoresis with dexamethasone         Location: [] Take home patch   [] In clinic    []  Ice     []  heat  []  Ice massage  []  Laser   []  Anodyne Position:  Location:    []  Laser with stim  []  Other:  Position:  Location:    []  Vasopneumatic Device Pressure:       [] lo [] med [] hi   Temperature: [] lo [] med [] hi   [x] Skin assessment post-treatment:  [x]intact []redness- no adverse reaction    []redness  adverse reaction:       10 min Therapeutic Exercise:  [x] See flow sheet :   Rationale: increase ROM, increase strength and improve coordination to improve the patients ability to perform ADLs. 25 min Neuromuscular Re-education:  [x]  See flow sheet :core stabilization   Rationale: increase ROM, increase strength, improve coordination, improve balance and increase proprioception  to improve the patients ability to improve core stability & lifting/squatting at work. With   [] TE   [] TA   [] neuro   [] other: Patient Education: [x] Review HEP    [] Progressed/Changed HEP based on:   [] positioning   [] body mechanics   [] transfers   [] heat/ice application    [] other:      Other Objective/Functional Measures:       Pain Level (0-10 scale) post treatment: 0    ASSESSMENT/Changes in Function: Ms. Alma Salcedo did well with initial exercises without increase in pain. Responded well to cuing for breathing & TA control. Patient will continue to benefit from skilled PT services to modify and progress therapeutic interventions, address functional mobility deficits, address ROM deficits, address strength deficits, analyze and address soft tissue restrictions, analyze and cue movement patterns, analyze and modify body mechanics/ergonomics, assess and modify postural abnormalities, address imbalance/dizziness and instruct in home and community integration to attain remaining goals. []  See Plan of Care  []  See progress note/recertification  []  See Discharge Summary         Progress towards goals / Updated goals:  Short Term Goals: To be accomplished in 1 weeks:  1. Establish HEP for ROM & Strengthening. issued  [address] [facility] [address] be accomplished in 4 weeks:  1. Patient will be independent with HEP for ROM & Strengthening.                        Eval Status:na   Patient has performed  2.  Pt will demonstrate safe lifting & squatting mechanics without cuing for carryover to stocking/lifting at work.  [facility] Status:poor attention to core, flexes at lumbar spine to  objects from floor   Not yet met  3. Pt will increase FOTO score to 70 points to demonstrate increased ease with ADLs.                          Eval Status: FOTO: 51   Reassess at 4th visit  4.  Pt will increase B hip flex/abd to grossly 4+/5 to improve lumbar mobility & squatting at work.             Eval Status: B hip abd: 3-/5 , B hip flex: 4-/5   Not yet formally reassessed       PLAN  []  Upgrade activities as tolerated     [x]  Continue plan of care  []  Update interventions per flow sheet       []  Discharge due to:_  []  Other:_      Kate Sullivan, PT 7/19/2017  8:52 AM    Future Appointments  Date Time Provider Marcello Sybil   [facility]2017 9:00 [facility]   7/26/2017 2:00 PM LALIT Root [facility]   7/28/2017 2:00 PM Rabia Duarte PTA [facility]   7/31/2017 10:30 AM Kate Sullivan PT [facility]   8/21/2017 9:50 AM Zoey Bradley  [address]

## 2017-07-21 ENCOUNTER — HOSPITAL ENCOUNTER (OUTPATIENT)
Dept: PHYSICAL THERAPY | Age: 37
Discharge: HOME | End: 2017-07-21
Payer: COMMERCIAL

## 2017-07-21 PROCEDURE — 97014 ELECTRIC STIMULATION THERAPY: CPT

## 2017-07-21 PROCEDURE — 97112 NEUROMUSCULAR REEDUCATION: CPT

## 2017-07-21 PROCEDURE — 97110 THERAPEUTIC EXERCISES: CPT

## 2017-07-21 NOTE — PROGRESS NOTES
PT DAILY TREATMENT NOTE 1216    Patient Name: Nadia Bridges  Date:2017  : 1980  [x]  Patient  Verified  Payor: BLUE CROSS / Plan: [facility] PPO / Product Type: PPO /    In time:1032  Out time:1115  Total Treatment Time (min): 43  Visit #: 3 of 8    Treatment Area: Low back pain [M54.5]  Other intervertebral disc degeneration, lumbar region [M51.36]  Muscle spasm of back [M62.830]    SUBJECTIVE  Pain Level (0-10 scale):  2  Any medication changes, allergies to medications, adverse drug reactions, diagnosis change, or new procedure performed?: [x] No    [] Yes (see summary sheet for update)  Subjective functional status/changes:   [] No changes reported  It is good today    OBJECTIVE    Modality rationale: decrease inflammation, decrease pain and increase tissue extensibility to improve the patients ability to continue to manage pain, improve mobility and progress functional reach lift and carry    Min Type Additional Details   10 [x] Estim:  [x]Unatt       [x]IFC  []Premod                        []Other:  []w/ice   [x]w/heat  Position:seated   Location:low back     [] Estim: []Att    []TENS instruct  []NMES                    []Other:  []w/US   []w/ice   []w/heat  Position:  Location:    []  Traction: [] Cervical       []Lumbar                       [] Prone          []Supine                       []Intermittent   []Continuous Lbs:  [] before manual  [] after manual    []  Ultrasound: []Continuous   [] Pulsed                           []1MHz   []3MHz W/cm2:  Location:    []  Iontophoresis with dexamethasone         Location: [] Take home patch   [] In clinic    []  Ice     []  heat  []  Ice massage  []  Laser   []  Anodyne Position:  Location:    []  Laser with stim  []  Other:  Position:  Location:    []  Vasopneumatic Device Pressure:       [] lo [] med [] hi   Temperature: [] lo [] med [] hi   [x] Skin assessment post-treatment:  [x]intact []redness- no adverse reaction []redness  adverse reaction:       8 min Therapeutic Exercise:  [x] See flow sheet :   Rationale: increase ROM, increase strength and improve coordination to improve the patients ability to perform ADLs. 25 min Neuromuscular Re-education:  [x]  See flow sheet :  Cues for exhalation to maintain Intra abdominal pressures    Rationale: increase ROM, increase strength, improve coordination, improve balance and increase proprioception  to improve the patients ability to improve core stability & lifting/squatting at work. With   [] TE   [] TA   [x] neuro   [] other: Patient Education: [x] Review HEP    [] Progressed/Changed HEP based on:   [x] positioning   [x] body mechanics   [x] transfers   [] heat/ice application    [x] other: breath pattern       Other Objective/Functinal Measures:      Pain Level (0-10 scale) post treatment: 0    ASSESSMENT/Changes in Function: patient doing quite well, needs to increase compliance with HEP to see any lasting change, as well as develop long term plan for self management, which was discussed     Patient will continue to benefit from skilled PT services to modify and progress therapeutic interventions, address functional mobility deficits, address ROM deficits, address strength deficits, analyze and address soft tissue restrictions, analyze and cue movement patterns, analyze and modify body mechanics/ergonomics, assess and modify postural abnormalities and instruct in home and community integration to attain remaining goals. []  See Plan of Care  []  See progress note/recertification  []  See Discharge Summary         Progress towards goals / Updated goals:  Short Term Goals: To be accomplished in 1 weeks:  1. Establish HEP for ROM & Strengthening. issued  [address] [facility] [address] be accomplished in 4 weeks:  1.  Patient will be independent with HEP for ROM & Strengthening.                        Eval Status:na                        Not MET - \"movin around and cleaning, but the exercises, no\"  2. Pt will demonstrate safe lifting & squatting mechanics without cuing for carryover to stocking/lifting at work.  [facility] Status:poor attention to core, flexes at lumbar spine to  objects from floor                        Not yet met, still working on body awareness for carryover   3. Pt will increase FOTO score to 70 points to demonstrate increased ease with ADLs.                          Eval Status: FOTO: 51                        Reassess at 4th visit  4.  Pt will increase B hip flex/abd to grossly 4+/5 to improve lumbar mobility & squatting at work.             Eval Status: B hip abd: 3-/5 , B hip flex: 4-/5                        Not yet formally reassessed, good attention to neutral spine with resisted ex   PLAN  []  Upgrade activities as tolerated     [x]  Continue plan of care  []  Update interventions per flow sheet       []  Discharge due to:_  []  Other:_      Caterina Ogden, PT 7/21/2017  10:31 AM    Future Appointments  Date Time Provider Marcello Devine   7/26/2017 2:00 PM [facility]   7/28/2017 2:00 PM [facility]   7/31/2017 10:30 AM [facility]   8/21/2017 9:50 AM Galileo Leal  [address]

## 2017-07-28 ENCOUNTER — HOSPITAL ENCOUNTER (OUTPATIENT)
Dept: PHYSICAL THERAPY | Age: 37
Discharge: HOME | End: 2017-07-28
Payer: COMMERCIAL

## 2017-07-28 PROCEDURE — 97112 NEUROMUSCULAR REEDUCATION: CPT

## 2017-07-28 PROCEDURE — 97110 THERAPEUTIC EXERCISES: CPT

## 2017-07-28 NOTE — PROGRESS NOTES
PT DAILY TREATMENT NOTE     Patient Name: Carter William  Date:2017  : 1980  [x]  Patient  Verified  Payor: BLUE CROSS / Plan: Taylor Felix [id number] PPO / Product Type: PPO /    In time:202  Out time:245  Total Treatment Time (min): 43  Visit #: 4 of 8    Treatment Area: Low back pain [M54.5]  Other intervertebral disc degeneration, lumbar region [M51.36]  Muscle spasm of back [M62.830]    SUBJECTIVE  Pain Level (0-10 scale): 0  Any medication changes, allergies to medications, adverse drug reactions, diagnosis change, or new procedure performed?: [x] No    [] Yes (see summary sheet for update)  Subjective functional status/changes:   [] No changes reported  I wasn't in town the other day, I didn't realize I had an appointment       OBJECTIVE    Modality rationale: PD lack of pain    Min Type Additional Details    [] Estim:  []Unatt       []IFC  []Premod                        []Other:  []w/ice   []w/heat  Position:  Location:    [] Estim: []Att    []TENS instruct  []NMES                    []Other:  []w/US   []w/ice   []w/heat  Position:  Location:    []  Traction: [] Cervical       []Lumbar                       [] Prone          []Supine                       []Intermittent   []Continuous Lbs:  [] before manual  [] after manual    []  Ultrasound: []Continuous   [] Pulsed                           []1MHz   []3MHz W/cm2:  Location:    []  Iontophoresis with dexamethasone         Location: [] Take home patch   [] In clinic    []  Ice     []  heat  []  Ice massage  []  Laser   []  Anodyne Position:  Location:    []  Laser with stim  []  Other:  Position:  Location:    []  Vasopneumatic Device Pressure:       [] lo [] med [] hi   Temperature: [] lo [] med [] hi   [] Skin assessment post-treatment:  []intact []redness- no adverse reaction    []redness  adverse reaction:       10 min Therapeutic Exercise:  [x] See flow sheet :   Rationale: increase ROM and increase strength to improve the patients ability to regain ease of work tasks       25 min Neuromuscular Re-education:  [x]  See flow sheet :  ADDED pelvic tilt on wall, attention to maintained shoulder position and trunk height,   Squats using sb with attention to uprigth trunk and avoiding IR/Add at knee/hips   Box lift and carry from wait to and from knee height   ADDED supine 90-90 heel tap down x 10   Rationale: increase ROM, increase strength, improve coordination, improve balance and increase proprioception  to improve the patients ability to improve core stability & lifting/squatting at work.             With   [x] TE   [] TA   [x] neuro   [] other: Patient Education: [x] Review HEP    [] Progressed/Changed HEP based on:   [x] positioning   [x] body mechanics   [] transfers   [x] heat/ice application    [] other:      Other Objective/Functional Measures: FOTO 66     Pain Level (0-10 scale) post treatment: 0    ASSESSMENT/Changes in Function: patient doing well, good attention to core support and able to maintain neutral, albeit with cues     Patient will continue to benefit from skilled PT services to modify and progress therapeutic interventions, address functional mobility deficits, address ROM deficits, address strength deficits, analyze and address soft tissue restrictions, analyze and cue movement patterns, analyze and modify body mechanics/ergonomics, assess and modify postural abnormalities and instruct in home and community integration to attain remaining goals. []  See Plan of Care  []  See progress note/recertification  []  See Discharge Summary         Progress towards goals / Updated goals:  Short Term Goals: To be accomplished in 1 weeks:  1. Establish HEP for ROM & Strengthening.                        issued  [address] [facility] 280 be accomplished in 4 weeks:  1.  Patient will be independent with HEP for ROM & Strengthening.                        Eval Status:na                        ACZ MET - \"movin around and cleaning, but the exercises, no\"  2. Pt will demonstrate safe lifting & squatting mechanics without cuing for carryover to stocking/lifting at work.  [place] [place] Status:poor attention to core, flexes at lumbar spine to  objects from floor                       Initiated proper lifting   3. Pt will increase FOTO score to 70 points to demonstrate increased ease with ADLs.                          Eval Status: FOTO: 38                        NCHREQYSLBN 23  4.  Pt will increase B hip flex/abd to grossly 4+/5 to improve lumbar mobility & squatting at work.             Eval Status: B hip abd: 3-/5 , B hip flex: 4-/5                        Not yet formally reassessed, good attention to neutral spine with resisted ex     PLAN   []  Upgrade activities as tolerated     [x]  Continue plan of care  []  Update interventions per flow sheet       []  Discharge due to:_  []  Other:_      Chasity House, PT 7/28/2017  2:09 PM    Future Appointments  Date Time Provider Marcello Sybil   7/31/2017 10:30 AM Deborah Connor PT [facility]   8/21/2017 9:50 AM Ney Dodson  [address]

## 2017-07-31 ENCOUNTER — HOSPITAL ENCOUNTER (OUTPATIENT)
Dept: PHYSICAL THERAPY | Age: 37
Discharge: HOME | End: 2017-07-31
Payer: COMMERCIAL

## 2017-07-31 PROCEDURE — 97110 THERAPEUTIC EXERCISES: CPT

## 2017-07-31 PROCEDURE — 97014 ELECTRIC STIMULATION THERAPY: CPT

## 2017-07-31 PROCEDURE — 97112 NEUROMUSCULAR REEDUCATION: CPT

## 2017-07-31 NOTE — PROGRESS NOTES
PT DAILY TREATMENT NOTE 12-16    Patient Name: Candance Fill  Date:2017  : 1980  [x]  Patient  Verified  Payor: BLUE CROSS / Plan: Taylor Felix [id number] PPO / Product Type: PPO /    In time:1030  Out time:1120  Total Treatment Time (min): 50  Visit #: 5 of 8    Treatment Area: Low back pain [M54.5]  Other intervertebral disc degeneration, lumbar region [M51.36]  Muscle spasm of back [M62.830]    SUBJECTIVE  Pain Level (0-10 scale): 5  Any medication changes, allergies to medications, adverse drug reactions, diagnosis change, or new procedure performed?: [x] No    [] Yes (see summary sheet for update)  Subjective functional status/changes:   [] No changes reported  \"I think I overdid it last night at work. \"    OBJECTIVE    Modality rationale: decrease pain and increase tissue extensibility to improve the patients ability to improve ease with mobility.    Min Type Additional Details   10 [x] Estim:  [x]Unatt       [x]IFC  []Premod                        []Other:  []w/ice   [x]w/heat  Position:seated  Location:low back    [] Estim: []Att    []TENS instruct  []NMES                    []Other:  []w/US   []w/ice   []w/heat  Position:  Location:    []  Traction: [] Cervical       []Lumbar                       [] Prone          []Supine                       []Intermittent   []Continuous Lbs:  [] before manual  [] after manual    []  Ultrasound: []Continuous   [] Pulsed                           []1MHz   []3MHz W/cm2:  Location:    []  Iontophoresis with dexamethasone         Location: [] Take home patch   [] In clinic    []  Ice     []  heat  []  Ice massage  []  Laser   []  Anodyne Position:  Location:    []  Laser with stim  []  Other:  Position:  Location:    []  Vasopneumatic Device Pressure:       [] lo [] med [] hi   Temperature: [] lo [] med [] hi   [x] Skin assessment post-treatment:  [x]intact []redness- no adverse reaction    []redness  adverse reaction:         15 min Therapeutic Exercise:  [x] See flow sheet :   Rationale: increase ROM, increase strength and improve coordination to improve the patients ability to improve ease with ADLs. 25 min Neuromuscular Re-education:  [x]  See flow sheet :   Rationale: increase ROM, increase strength, improve coordination, improve balance and increase proprioception  to improve the patients ability to improve ease with squat/lift form at work. With   [] TE   [] TA   [] neuro   [] other: Patient Education: [x] Review HEP    [] Progressed/Changed HEP based on:   [] positioning   [] body mechanics   [] transfers   [] heat/ice application    [] other:      Other Objective/Functional Measures:      Pain Level (0-10 scale) post treatment: 4    ASSESSMENT/Changes in Function: Ms. Valentino Arts was more sore today after working last night. Some cuing for pelvic motions with exercises and for breath. Patient will continue to benefit from skilled PT services to modify and progress therapeutic interventions, address functional mobility deficits, address ROM deficits, address strength deficits, analyze and address soft tissue restrictions, analyze and cue movement patterns, analyze and modify body mechanics/ergonomics, assess and modify postural abnormalities, address imbalance/dizziness and instruct in home and community integration to attain remaining goals. []  See Plan of Care  []  See progress note/recertification  []  See Discharge Summary         Progress towards goals / Updated goals:  Short Term Goals: To be accomplished in 1 weeks:  1. Establish HEP for ROM & Strengthening.                        issued  [address] [facility] [address] be accomplished in 4 weeks:  1. Patient will be independent with HEP for ROM & Strengthening.                        Eval Status:na                        Not MET - \"movin around and cleaning, but the exercises, no\"  2.  Pt will demonstrate safe lifting & squatting mechanics without cuing for carryover to stocking/lifting at work.  Edmonia Ernie Status:poor attention to core, flexes at lumbar spine to  objects from floor                    progressing: able to demo good squat with mild cuing for trunk posturing  3. Pt will increase FOTO score to 70 points to demonstrate increased ease with ADLs.                          Eval Status: FOTO: 13                        JEDSBDGETIB 60  4.  Pt will increase B hip flex/abd to grossly 4+/5 to improve lumbar mobility & squatting at work.             Eval Status: B hip abd: 3-/5 , B hip flex: 4-/5                        R hip flexion: 4-/5, L hip flexion: 5/5       PLAN  []  Upgrade activities as tolerated     [x]  Continue plan of care  []  Update interventions per flow sheet       []  Discharge due to:_  []  Other:_      Bibi Johnson, PT 7/31/2017  10:34 AM    Future Appointments  Date Time Provider Marcello Devine   8/21/2017 9:50 AM Khai Cee MD [facility]

## 2017-08-21 ENCOUNTER — OFFICE VISIT (OUTPATIENT)
Dept: ORTHOPEDIC SURGERY | Age: 37
End: 2017-08-21

## 2017-08-21 VITALS
SYSTOLIC BLOOD PRESSURE: 118 MMHG | DIASTOLIC BLOOD PRESSURE: 71 MMHG | RESPIRATION RATE: 18 BRPM | WEIGHT: 275 LBS | BODY MASS INDEX: 43.16 KG/M2 | TEMPERATURE: 98.2 F | HEART RATE: 70 BPM | OXYGEN SATURATION: 100 % | HEIGHT: 67 IN

## 2017-08-21 DIAGNOSIS — M62.830 MUSCLE SPASM OF BACK: ICD-10-CM

## 2017-08-21 DIAGNOSIS — M47.816 LUMBAR FACET ARTHROPATHY: ICD-10-CM

## 2017-08-21 DIAGNOSIS — M51.36 DDD (DEGENERATIVE DISC DISEASE), LUMBAR: ICD-10-CM

## 2017-08-21 RX ORDER — CYCLOBENZAPRINE HCL 10 MG
10 TABLET ORAL 2 TIMES DAILY
Qty: 60 TAB | Refills: 2 | Status: SHIPPED | OUTPATIENT
Start: 2017-08-21 | End: 2018-05-09

## 2017-08-21 RX ORDER — IBUPROFEN 800 MG/1
TABLET ORAL
Qty: 60 TAB | Refills: 2 | Status: SHIPPED | OUTPATIENT
Start: 2017-08-21 | End: 2018-03-30 | Stop reason: ALTCHOICE

## 2017-08-21 NOTE — PATIENT INSTRUCTIONS
Low Back Arthritis: Exercises  Your Care Instructions  Here are some examples of typical rehabilitation exercises for your condition. Start each exercise slowly. Ease off the exercise if you start to have pain. Your doctor or physical therapist will tell you when you can start these exercises and which ones will work best for you. When you are not being active, find a comfortable position for rest. Some people are comfortable on the floor or a medium-firm bed with a small pillow under their head and another under their knees. Some people prefer to lie on their side with a pillow between their knees. Don't stay in one position for too long. Take short walks (10 to 20 minutes) every 2 to 3 hours. Avoid slopes, hills, and stairs until you feel better. Walk only distances you can manage without pain, especially leg pain. How to do the exercises  Pelvic tilt    1. Lie on your back with your knees bent. 2. \"Brace\" your stomach--tighten your muscles by pulling in and imagining your belly button moving toward your spine. 3. Press your lower back into the floor. You should feel your hips and pelvis rock back. 4. Hold for 6 seconds while breathing smoothly. 5. Relax and allow your pelvis and hips to rock forward. 6. Repeat 8 to 12 times. Back stretches    1. Get down on your hands and knees on the floor. 2. Relax your head and allow it to droop. Round your back up toward the ceiling until you feel a nice stretch in your upper, middle, and lower back. Hold this stretch for as long as it feels comfortable, or about 15 to 30 seconds. 3. Return to the starting position with a flat back while you are on your hands and knees. 4. Let your back sway by pressing your stomach toward the floor. Lift your buttocks toward the ceiling. 5. Hold this position for 15 to 30 seconds. 6. Repeat 2 to 4 times. Follow-up care is a key part of your treatment and safety.  Be sure to make and go to all appointments, and call your doctor if you are having problems. It's also a good idea to know your test results and keep a list of the medicines you take. Where can you learn more? Go to http://suzan-kinga.info/. Enter T137 in the search box to learn more about \"Low Back Arthritis: Exercises. \"  Current as of: March 21, 2017  Content Version: 11.3  © 4840-9607 [employer]. Care instructions adapted under license by [employer] (which disclaims liability or warranty for this information). If you have questions about a medical condition or this instruction, always ask your healthcare professional. [place] [address] any warranty or liability for your use of this information.

## 2017-08-21 NOTE — MR AVS SNAPSHOT
Visit Information Date & Time Provider Department Dept. Phone Encounter #  
 8/21/2017  9:50 AM Zoey Bradley MD [facility] [phone] [id number] Follow-up Instructions Return in about 3 months (around 11/21/2017) for Medication follow up. Routing History Upcoming Health Maintenance Date Due DTaP/Tdap/Td series (1 - Tdap) 10/10/2001 INFLUENZA AGE 9 TO ADULT 8/1/2017 PAP AKA CERVICAL CYTOLOGY 9/9/2018 Allergies as of 8/21/2017  Review Complete On: 8/21/2017 By: Zoey Bradley MD  
 No Known Allergies Current Immunizations  Never Reviewed No immunizations on file. Not reviewed this visit You Were Diagnosed With   
  
 Codes Comments DDD (degenerative disc disease), lumbar     ICD-10-CM: M51.36 
ICD-9-CM: 722.52 Lumbar facet arthropathy     ICD-10-CM: M12.88 ICD-9-CM: 721.3 Muscle spasm of back     ICD-10-CM: I51.037 ICD-9-CM: 724.8 Vitals BP Pulse Temp Resp Height(growth percentile) Weight(growth percentile) 118/71 70 98.2 °F (36.8 °C) (Oral) 18 5' 7\" (1.702 m) 275 lb (124.7 kg) SpO2 BMI OB Status Smoking Status 100% 43.07 kg/m2 Having regular periods Never Smoker BMI and BSA Data Body Mass Index Body Surface Area 43.07 kg/m 2 2.43 m 2 Preferred Pharmacy Pharmacy Name Phone [id number] [address] Your Updated Medication List  
  
   
This list is accurate as of: 8/21/17 11:07 AM.  Always use your most recent med list.  
  
  
  
  
 cyclobenzaprine 10 mg tablet Commonly known as:  FLEXERIL Take 1 Tab by mouth two (2) times a day. Take as needed for muscle spasm  Indications: MUSCLE SPASM * ibuprofen 200 mg tablet Commonly known as:  MOTRIN Take 800 mg by mouth every eight (8) hours as needed for Pain. * ibuprofen 800 mg tablet Commonly known as:  MOTRIN  
 1 po bid prn pain  
  
 traMADol 50 mg tablet Commonly known as:  ULTRAM  
Take 1 Tab by mouth every six (6) hours as needed for Pain. Max Daily Amount: 200 mg.  
  
 * Notice: This list has 2 medication(s) that are the same as other medications prescribed for you. Read the directions carefully, and ask your doctor or other care provider to review them with you. Prescriptions Printed Refills  
 ibuprofen (MOTRIN) 800 mg tablet 2 Si po bid prn pain  
 Class: Print  
 cyclobenzaprine (FLEXERIL) 10 mg tablet 2 Sig: Take 1 Tab by mouth two (2) times a day. Take as needed for muscle spasm  Indications: MUSCLE SPASM Class: Print Route: Oral  
  
Follow-up Instructions Return in about 3 months (around 2017) for Medication follow up. Patient Instructions Low Back Arthritis: Exercises Your Care Instructions Here are some examples of typical rehabilitation exercises for your condition. Start each exercise slowly. Ease off the exercise if you start to have pain. Your doctor or physical therapist will tell you when you can start these exercises and which ones will work best for you. When you are not being active, find a comfortable position for rest. Some people are comfortable on the floor or a medium-firm bed with a small pillow under their head and another under their knees. Some people prefer to lie on their side with a pillow between their knees. Don't stay in one position for too long. Take short walks (10 to 20 minutes) every 2 to 3 hours. Avoid slopes, hills, and stairs until you feel better. Walk only distances you can manage without pain, especially leg pain. How to do the exercises Pelvic tilt 1. Lie on your back with your knees bent. 2. \"Brace\" your stomachtighten your muscles by pulling in and imagining your belly button moving toward your spine. 3. Press your lower back into the floor. You should feel your hips and pelvis rock back. 4. Hold for 6 seconds while breathing smoothly. 5. Relax and allow your pelvis and hips to rock forward. 6. Repeat 8 to 12 times. Back stretches 1. Get down on your hands and knees on the floor. 2. Relax your head and allow it to droop. Round your back up toward the ceiling until you feel a nice stretch in your upper, middle, and lower back. Hold this stretch for as long as it feels comfortable, or about 15 to 30 seconds. 3. Return to the starting position with a flat back while you are on your hands and knees. 4. Let your back sway by pressing your stomach toward the floor. Lift your buttocks toward the ceiling. 5. Hold this position for 15 to 30 seconds. 6. Repeat 2 to 4 times. Follow-up care is a key part of your treatment and safety. Be sure to make and go to all appointments, and call your doctor if you are having problems. It's also a good idea to know your test results and keep a list of the medicines you take. Where can you learn more? Go to http://suzan-kinga.info/. Enter S101 in the search box to learn more about \"Low Back Arthritis: Exercises. \" Current as of: March 21, 2017 Content Version: 11.3 © 8773-4570 [employer]. Care instructions adapted under license by [employer] (which disclaims liability or warranty for this information). If you have questions about a medical condition or this instruction, always ask your healthcare professional. [place] [address] any warranty or liability for your use of this information. Introducing [facility]! Farhana Rodriguez introduces goBramble patient portal. Now you can access parts of your medical record, email your doctor's office, and request medication refills online. 1. In your internet browser, go to [email or web address] 2. Click on the First Time User? Click Here link in the Sign In box. You will see the New Member Sign Up page. 3. Enter your foodpanda / hellofood Access Code exactly as it appears below. You will not need to use this code after youve completed the sign-up process. If you do not sign up before the expiration date, you must request a new code. · foodpanda / hellofood Access Code: [email or web address] Expires: 10/9/2017 10:29 AM 
 
4. Enter the last four digits of your Social Security Number (xxxx) and Date of Birth (mm/dd/yyyy) as indicated and click Submit. You will be taken to the next sign-up page. 5. Create a foodpanda / hellofood ID. This will be your foodpanda / hellofood login ID and cannot be changed, so think of one that is secure and easy to remember. 6. Create a foodpanda / hellofood password. You can change your password at any time. 7. Enter your Password Reset Question and Answer. This can be used at a later time if you forget your password. 8. Enter your e-mail address. You will receive e-mail notification when new information is available in [address]. 9. Click Sign Up. You can now view and download portions of your medical record. 10. Click the Download Summary menu link to download a portable copy of your medical information. If you have questions, please visit the Frequently Asked Questions section of the foodpanda / hellofood website. Remember, foodpanda [employer] hellofood is NOT to be used for urgent needs. For medical emergencies, dial 911. Now available from your iPhone and Android! Please provide this summary of care documentation to your next provider. Your primary care clinician is listed as Cora Loredo. If you have any questions after today's visit, please call [phone].

## 2017-08-21 NOTE — PROGRESS NOTES
[facility]  [address] [place] [address], [facility] [address]  Phone: [phone]  Fax: [phone]      ASSESSMENT   Diagnoses and all orders for this visit:    1. DDD (degenerative disc disease), lumbar  -     ibuprofen (MOTRIN) 800 mg tablet; 1 po bid prn pain    2. Lumbar facet arthropathy  -     ibuprofen (MOTRIN) 800 mg tablet; 1 po bid prn pain    3. Muscle spasm of back  -     cyclobenzaprine (FLEXERIL) 10 mg tablet; Take 1 Tab by mouth two (2) times a day. Take as needed for muscle spasm  Indications: MUSCLE SPASM         IMPRESSION AND PLAN:  Candance Fill is a 39 y.o. female with history of lumbar pain. She tried 5-6 weeks of physical therapy (2 sessions a week) since her last office visit and experienced improvement. Pt reports that she is doing well at this time and denies any pain or weakness radiating down the legs at this time. 1) Pt was given information on lumbar arthritis exercises. 2) I recommended the Pt continue with exercises learned through physical therapy. 3) I encouraged the Pt to exercise regularly, 30 minutes a day, 5 days a week. 4) I recommended the Pt try water exercise. 5) Pt received a refill of ibuprofen 800 mg 1 tab BID and Flexeril 10 mg 1 tab BID prn muscle spasm. 6) Ms. Daxa Singer has a reminder for a \"due or due soon\" health maintenance. I have asked that she contact her primary care provider, Amadeo Maciel NP, for follow-up on this health maintenance. 7)  demonstrated consistency with prescribing. 8) Pt will follow-up in 3 months. HISTORY OF PRESENT ILLNESS:  Candance Fill is a 39 y.o. female with history of lumbar pain. She tried 5-6 weeks of physical therapy (2 sessions a week) since her last office visit and experienced improvement. Pt reports that she is doing well at this time and is experiencing 3/10 pain. She denies any pain or weakness radiating down the legs at this time.  Pt reports that despite the improvement her pain is not completely alleviated. Of note, Pt works at that [employer] on [address]. She reports that she is able to bend, stoop, and lift while at work without difficulty. She has not recently taken Flexeril 10 mg or ibuprofen 800 mg due to the improvement in her pain. Pt at this time desires to continue with current care. Of note, Pt is not a smoker. She has 9,10,11, and 15 y.o. children. Pain Scale: 3/10    PCP: Rupert Reece NP       History reviewed. No pertinent past medical history. Social History     Social History    Marital status:      Spouse name: N/A    Number of children: N/A    Years of education: N/A     Occupational History    Not on file. Social History Main Topics    Smoking status: Never Smoker    Smokeless tobacco: Never Used    Alcohol use No    Drug use: No    Sexual activity: Yes     Partners: Male     Birth control/ protection: None     Other Topics Concern    Caffeine Concern Yes     daily 12oz    Sleep Concern No    Stress Concern No    Weight Concern No    Exercise No    Seat Belt No    Self-Exams Yes     Social History Narrative       Current Outpatient Prescriptions   Medication Sig Dispense Refill    ibuprofen (MOTRIN) 800 mg tablet 1 po bid prn pain 60 Tab 2    cyclobenzaprine (FLEXERIL) 10 mg tablet Take 1 Tab by mouth two (2) times a day. Take as needed for muscle spasm  Indications: MUSCLE SPASM 60 Tab 2    ibuprofen (MOTRIN) 200 mg tablet Take 800 mg by mouth every eight (8) hours as needed for Pain.  traMADol (ULTRAM) 50 mg tablet Take 1 Tab by mouth every six (6) hours as needed for Pain. Max Daily Amount: 200 mg. 30 Tab 0       No Known Allergies      REVIEW OF SYSTEMS    Constitutional: Negative for fever, chills, or weight change. Respiratory: Negative for cough or shortness of breath. Cardiovascular: Negative for chest pain or palpitations.   Gastrointestinal: Negative for acid reflux, change in bowel habits, or constipation. Genitourinary: Negative for dysuria and flank pain. Musculoskeletal: Positive for lumbar pain. Skin: Negative for rash. Neurological: Negative for headaches, dizziness, or numbness. Endo/Heme/Allergies: Negative for increased bruising. Psychiatric/Behavioral: Negative for difficulty with sleep. PHYSICAL EXAMINATION  Visit Vitals    /71    Pulse 70    Temp 98.2 °F (36.8 °C) (Oral)    Resp 18    Ht 5' 7\" (1.702 m)    Wt 275 lb (124.7 kg)    SpO2 100%    BMI 43.07 kg/m2       Constitutional: Awake, alert, and in no acute distress  Neurological: 1+ symmetrical DTRs in the lower extremities. Sensation to light touch is intact. Skin: warm, dry, and intact. Musculoskeletal: Minimal tenderness to palpation in the lower lumbar region. No pain with extension, axial loading, or forward flexion. No pain with internal or external rotation of her hips. Negative straight leg raise bilaterally. Hip Flex  Quads Hamstrings Ankle DF EHL Ankle PF   Right +4/5 +4/5 +4/5 +4/5 +4/5 +4/5   Left +4/5 +4/5 +4/5 +4/5 +4/5 +4/5     IMAGING:    Lumbar spine x-rays from 06/02/2017 were personally reviewed with the Pt and demonstrated:    Results from Hospital Encounter encounter on 06/02/17   XR SPINE LUMBAR BEND/FLEXION EXTENSION   Narrative Lumbar spine lateral with flexion and extension    HISTORY: Low back pain for 3 to 4 days without radiation into the lower  extremities. COMPARISON: None. FINDINGS:     4 standing lateral views obtained including neutral lateral view, lateral view  in extension and flexion and cone-down lateral view. There is limited change in  patient positioning between the films. Vertebral heights maintained. Moderate  narrowing of the apparent L5-S1 disc space. Normal alignment on the neutral  lateral view. No listhesis on flexion or extension. Impression IMPRESSION:     Moderate narrowing of the apparent L5-S1 disc space.     No compression fracture. No listhesis on flexion or extension although there does not appear to be  significant change in patient positioning between the multiple views. Written by Anisa Durant, as dictated by Gita Garza MD.  I, Dr. Gita Garza confirm that all documentation is accurate.

## 2017-08-25 NOTE — PROGRESS NOTES
[facility] [address], [address] [phone] fax    Discharge Summary  Patient name: Jyothi Farah Start of Care: 2017   Referral source: Susan Ceron MD : 1980                          Medical Diagnosis: Low back pain [M54.5]  Other intervertebral disc degeneration, lumbar region [M51.36]  Muscle spasm of back [M62.830] Onset Date:17                          Treatment Diagnosis: low back pain   Prior Hospitalization: see medical history Provider#: [id number]   Medications: Verified on Patient summary List    Comorbidities: OA   Prior Level of Function: 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 Functionally independent prior to injury. Lives with 4 children in 2 story house         Visits from Start of Care: 5    Missed Visits: 1    Reporting Period : 17 to 17    Assessment/Summary of care: Patient last seen for PT on 17   , at which time the following assessment was made  \"Ms. Maru Ontiveros was more sore today after working last night. Some cuing for pelvic motions with exercises and for breath. Short Term Goals: To be accomplished in 1 weeks:  1. Establish HEP for ROM & Strengthening.                        issued  [address] [facility] [address] be accomplished in 4 weeks:  1. Patient will be independent with HEP for ROM & Strengthening.                        Eval Status:na                        Not MET - \"movin around and cleaning, but the exercises, no\"  2. Pt will demonstrate safe lifting & squatting mechanics without cuing for carryover to stocking/lifting at work.  Florentin Cool Status:poor attention to core, flexes at lumbar spine to  objects from floor                    progressing: able to demo good squat with mild cuing for trunk posturing  3. Pt will increase FOTO score to 70 points to demonstrate increased ease with ADLs.                           Eval Status: FOTO: 51                        PROGRESSING 66  4. Pt will increase B hip flex/abd to grossly 4+/5 to improve lumbar mobility & squatting at work.             Eval Status: B hip abd: 3-/5 , B hip flex: 4-/5                        R hip flexion: 4-/5, L hip flexion: 5/5:\"      She has not been back in touch with this office since that time, but per [facility] has seen spine specialist who recommended she continue at home with ex learned in PT. We will discharge at this time present status unknown, goals not met.        Thank you for this referral.        RECOMMENDATIONS:  [x]Discontinue therapy: [x]Patient has reached or is progressing toward set goals      [x]Patient  has abdicated      []Due to lack of appreciable progress towards set goals    Teri Garland, PT 8/25/2017 4:43 PM

## 2018-03-30 ENCOUNTER — OFFICE VISIT (OUTPATIENT)
Dept: FAMILY MEDICINE CLINIC | Age: 38
End: 2018-03-30

## 2018-03-30 VITALS
BODY MASS INDEX: 42.5 KG/M2 | TEMPERATURE: 99 F | SYSTOLIC BLOOD PRESSURE: 116 MMHG | HEART RATE: 65 BPM | WEIGHT: 270.8 LBS | OXYGEN SATURATION: 99 % | DIASTOLIC BLOOD PRESSURE: 73 MMHG | RESPIRATION RATE: 16 BRPM | HEIGHT: 67 IN

## 2018-03-30 DIAGNOSIS — M25.562 POSTERIOR LEFT KNEE PAIN: Primary | ICD-10-CM

## 2018-03-30 PROBLEM — E66.01 OBESITY, MORBID (HCC): Status: ACTIVE | Noted: 2018-03-30

## 2018-03-30 RX ORDER — IBUPROFEN 800 MG/1
800 TABLET ORAL
Qty: 45 TAB | Refills: 0 | Status: SHIPPED | OUTPATIENT
Start: 2018-03-30 | End: 2018-05-09

## 2018-03-30 NOTE — PROGRESS NOTES
HISTORY OF PRESENT ILLNESS  Wilman Arredondo is a 40 y.o. female. Leg Pain    The history is provided by the patient. This is a new problem. The current episode started 2 days ago. The problem occurs constantly. The problem has not changed since onset. Pain location: left leg. The quality of the pain is described as sharp. The pain is at a severity of 10/10. The pain is severe. Associated symptoms include back pain (chronic). Pertinent negatives include no numbness and no tingling. She has tried nothing for the symptoms. No Known Allergies  Current Outpatient Prescriptions   Medication Sig Dispense Refill    cyclobenzaprine (FLEXERIL) 10 mg tablet Take 1 Tab by mouth two (2) times a day. Take as needed for muscle spasm  Indications: MUSCLE SPASM 60 Tab 2    traMADol (ULTRAM) 50 mg tablet Take 1 Tab by mouth every six (6) hours as needed for Pain. Max Daily Amount: 200 mg. 30 Tab 0     History reviewed. No pertinent past medical history. Review of Systems   Respiratory: Negative for shortness of breath. Cardiovascular: Negative for chest pain. Musculoskeletal: Positive for back pain (chronic) and joint pain (left leg pain ). Neurological: Negative for tingling and numbness. Visit Vitals    /73 (BP 1 Location: Right arm, BP Patient Position: Sitting)    Pulse 65    Temp 99 °F (37.2 °C) (Oral)    Resp 16    Ht 5' 7\" (1.702 m)    Wt 270 lb 12.8 oz (122.8 kg)    LMP 03/26/2018    SpO2 99%    BMI 42.41 kg/m2     Physical Exam   Constitutional: She appears well-developed and well-nourished. HENT:   Head: Normocephalic and atraumatic. Neck: Normal range of motion. Neck supple. Carotid bruit is not present. Cardiovascular: Normal rate, regular rhythm and normal heart sounds. Pulmonary/Chest: Effort normal and breath sounds normal. She has no wheezes. She has no rales.    Musculoskeletal:        Left knee: She exhibits swelling (small amount of swelling to posterior knee, area tender to palpation ). She exhibits no effusion. Tenderness (posteriorly) found. Lumbar back: She exhibits tenderness. Musculoskeletal: point tenderness along paraspinals bilaterally lumbar sacral region. -bilateral straight leg test.  DTRs patellafemoral +1 bilaterally no antalgic gait. ASSESSMENT and PLAN    ICD-10-CM ICD-9-CM    1. Posterior left knee pain M25.562 719.46 ibuprofen (MOTRIN) 800 mg tablet      US EXT NONVAS LT COMP   May use heat to back 3-4 times in 20 min intervals as needed for comfort  I have discussed the diagnosis with the patient and the intended plan as seen in the above orders. The patient has received an after-visit summary and questions were answered concerning future plans. I have discussed medication side effects and warnings with the patient as well. Patient agreeable with above plan and verbalizes understanding. Follow-up Disposition:  Return in about 4 weeks (around 4/27/2018) for left knee pain.

## 2018-03-30 NOTE — MR AVS SNAPSHOT
[address] [facility] 
 
 
 [address] [address] [address] [facility] [address] 
[phone] Patient: Nadia Bridges MRN: [id number] :1980 Visit Information Date & Time Provider Department Dept. Phone Encounter #  
 3/30/2018  1:15 PM Alma Delia Huerta, [address] [facility] [id number] Follow-up Instructions Return in about 4 weeks (around 2018) for left knee pain. Upcoming Health Maintenance Date Due DTaP/Tdap/Td series (1 - Tdap) 10/10/2001 Influenza Age 5 to Adult 10/11/2018* PAP AKA CERVICAL CYTOLOGY 2018 *Topic was postponed. The date shown is not the original due date. Allergies as of 3/30/2018  Review Complete On: 3/30/2018 By: Alma Delia Huerta NP No Known Allergies Current Immunizations  Never Reviewed No immunizations on file. Not reviewed this visit You Were Diagnosed With   
  
 Codes Comments Posterior left knee pain    -  Primary ICD-10-CM: C15.683 ICD-9-CM: 719.46 Vitals BP Pulse Temp Resp Height(growth percentile) Weight(growth percentile) 116/73 (BP 1 Location: Right arm, BP Patient Position: Sitting) 65 99 °F (37.2 °C) (Oral) 16 5' 7\" (1.702 m) 270 lb 12.8 oz (122.8 kg) LMP SpO2 BMI OB Status Smoking Status 2018 99% 42.41 kg/m2 Having regular periods Never Smoker Vitals History BMI and BSA Data Body Mass Index Body Surface Area  
 42.41 kg/m 2 2.41 m 2 Preferred Pharmacy Pharmacy Name Phone [address] [phone] Your Updated Medication List  
  
   
This list is accurate as of 3/30/18  2:41 PM.  Always use your most recent med list.  
  
  
  
  
 cyclobenzaprine 10 mg tablet Commonly known as:  FLEXERIL Take 1 Tab by mouth two (2) times a day. Take as needed for muscle spasm  Indications: MUSCLE SPASM  
  
 ibuprofen 800 mg tablet Commonly known as:  MOTRIN Take 1 Tab by mouth every eight (8) hours as needed for Pain.  
  
 traMADol 50 mg tablet Commonly known as:  ULTRAM  
Take 1 Tab by mouth every six (6) hours as needed for Pain. Max Daily Amount: 200 mg. Prescriptions Sent to Pharmacy Refills  
 ibuprofen (MOTRIN) 800 mg tablet 0 Sig: Take 1 Tab by mouth every eight (8) hours as needed for Pain. Class: Normal  
 Pharmacy: [facility] [address]  #: [phone] Route: Oral  
  
Follow-up Instructions Return in about 4 weeks (around 4/27/2018) for left knee pain. To-Do List   
 03/30/2018 Imaging:  US EXT NONVAS LT COMP Patient Instructions Knee Pain or Injury: Care Instructions Your Care Instructions Injuries are a common cause of knee problems. Sudden (acute) injuries may be caused by a direct blow to the knee. They can also be caused by abnormal twisting, bending, or falling on the knee. Pain, bruising, or swelling may be severe, and may start within minutes of the injury. Overuse is another cause of knee pain. Other causes are climbing stairs, kneeling, and other activities that use the knee. Everyday wear and tear, especially as you get older, also can cause knee pain. Rest, along with home treatment, often relieves pain and allows your knee to heal. If you have a serious knee injury, you may need tests and treatment. Follow-up care is a key part of your treatment and safety. Be sure to make and go to all appointments, and call your doctor if you are having problems. It's also a good idea to know your test results and keep a list of the medicines you take. How can you care for yourself at home? · Be safe with medicines. Read and follow all instructions on the label. ¨ If the doctor gave you a prescription medicine for pain, take it as prescribed.  
¨ If you are not taking a prescription pain medicine, ask your doctor if you can take an over-the-counter medicine. · Rest and protect your knee. Take a break from any activity that may cause pain. · Put ice or a cold pack on your knee for 10 to 20 minutes at a time. Put a thin cloth between the ice and your skin. · Prop up a sore knee on a pillow when you ice it or anytime you sit or lie down for the next 3 days. Try to keep it above the level of your heart. This will help reduce swelling. · If your knee is not swollen, you can put moist heat, a heating pad, or a warm cloth on your knee. · If your doctor recommends an elastic bandage, sleeve, or other type of support for your knee, wear it as directed. · Follow your doctor's instructions about how much weight you can put on your leg. Use a cane, crutches, or a walker as instructed. · Follow your doctor's instructions about activity during your healing process. If you can do mild exercise, slowly increase your activity. · Reach and stay at a healthy weight. Extra weight can strain the joints, especially the knees and hips, and make the pain worse. Losing even a few pounds may help. When should you call for help? Call 911 anytime you think you may need emergency care. For example, call if: 
? · You have symptoms of a blood clot in your lung (called a pulmonary embolism). These may include: 
¨ Sudden chest pain. ¨ Trouble breathing. ¨ Coughing up blood. ?Call your doctor now or seek immediate medical care if: 
? · You have severe or increasing pain. ? · Your leg or foot turns cold or changes color. ? · You cannot stand or put weight on your knee. ? · Your knee looks twisted or bent out of shape. ? · You cannot move your knee. ? · You have signs of infection, such as: 
¨ Increased pain, swelling, warmth, or redness. ¨ Red streaks leading from the knee. ¨ Pus draining from a place on your knee. ¨ A fever. ? · You have signs of a blood clot in your leg (called a deep vein thrombosis), such as: ¨ Pain in your calf, back of the knee, thigh, or groin. ¨ Redness and swelling in your leg or groin. ? Watch closely for changes in your health, and be sure to contact your doctor if: 
? · You have tingling, weakness, or numbness in your knee. ? · You have any new symptoms, such as swelling. ? · You have bruises from a knee injury that last longer than 2 weeks. ? · You do not get better as expected. Where can you learn more? Go to http://suzan-kinga.info/. Enter K195 in the search box to learn more about \"Knee Pain or Injury: Care Instructions. \" Current as of: March 20, 2017 Content Version: 11.4 © 7268-3864 [employer]. Care instructions adapted under license by [employer] (which disclaims liability or warranty for this information). If you have questions about a medical condition or this instruction, always ask your healthcare professional. [address] [address] any warranty or liability for your use of this information. Introducing [facility] & HE[facility]! Jessica Villavicencio introduces Apse patient portal. Now you can access parts of your medical record, email your doctor's office, and request medication refills online. 1. In your internet browser, go to [email or web address] 2. Click on the First Time User? Click Here link in the Sign In box. You will see the New Member Sign Up page. 3. Enter your Apse Access Code exactly as it appears below. You will not need to use this code after youve completed the sign-up process. If you do not sign up before the expiration date, you must request a new code. · Apse Access Code: [email or web address] Expires: 6/28/2018  2:41 PM 
 
4. Enter the last four digits of your Social Security Number (xxxx) and Date of Birth (mm/dd/yyyy) as indicated and click Submit. You will be taken to the next sign-up page. 5. Create a Apse ID.  This will be your Apse login ID and cannot be changed, so think of one that is secure and easy to remember. 6. Create a The Box password. You can change your password at any time. 7. Enter your Password Reset Question and Answer. This can be used at a later time if you forget your password. 8. Enter your e-mail address. You will receive e-mail notification when new information is available in [address] 9. Click Sign Up. You can now view and download portions of your medical record. 10. Click the Download Summary menu link to download a portable copy of your medical information. If you have questions, please visit the Frequently Asked Questions section of the The Box website. Remember, The Box is NOT to be used for urgent needs. For medical emergencies, dial 911. Now available from your iPhone and Android! Please provide this summary of care documentation to your next provider. Your primary care clinician is listed as Joo García. If you have any questions after today's visit, please call [phone].

## 2018-03-30 NOTE — PATIENT INSTRUCTIONS
Knee Pain or Injury: Care Instructions  Your Care Instructions    Injuries are a common cause of knee problems. Sudden (acute) injuries may be caused by a direct blow to the knee. They can also be caused by abnormal twisting, bending, or falling on the knee. Pain, bruising, or swelling may be severe, and may start within minutes of the injury. Overuse is another cause of knee pain. Other causes are climbing stairs, kneeling, and other activities that use the knee. Everyday wear and tear, especially as you get older, also can cause knee pain. Rest, along with home treatment, often relieves pain and allows your knee to heal. If you have a serious knee injury, you may need tests and treatment. Follow-up care is a key part of your treatment and safety. Be sure to make and go to all appointments, and call your doctor if you are having problems. It's also a good idea to know your test results and keep a list of the medicines you take. How can you care for yourself at home? · Be safe with medicines. Read and follow all instructions on the label. ¨ If the doctor gave you a prescription medicine for pain, take it as prescribed. ¨ If you are not taking a prescription pain medicine, ask your doctor if you can take an over-the-counter medicine. · Rest and protect your knee. Take a break from any activity that may cause pain. · Put ice or a cold pack on your knee for 10 to 20 minutes at a time. Put a thin cloth between the ice and your skin. · Prop up a sore knee on a pillow when you ice it or anytime you sit or lie down for the next 3 days. Try to keep it above the level of your heart. This will help reduce swelling. · If your knee is not swollen, you can put moist heat, a heating pad, or a warm cloth on your knee. · If your doctor recommends an elastic bandage, sleeve, or other type of support for your knee, wear it as directed.   · Follow your doctor's instructions about how much weight you can put on your leg. Use a cane, crutches, or a walker as instructed. · Follow your doctor's instructions about activity during your healing process. If you can do mild exercise, slowly increase your activity. · Reach and stay at a healthy weight. Extra weight can strain the joints, especially the knees and hips, and make the pain worse. Losing even a few pounds may help. When should you call for help? Call 911 anytime you think you may need emergency care. For example, call if:  ? · You have symptoms of a blood clot in your lung (called a pulmonary embolism). These may include:  ¨ Sudden chest pain. ¨ Trouble breathing. ¨ Coughing up blood. ?Call your doctor now or seek immediate medical care if:  ? · You have severe or increasing pain. ? · Your leg or foot turns cold or changes color. ? · You cannot stand or put weight on your knee. ? · Your knee looks twisted or bent out of shape. ? · You cannot move your knee. ? · You have signs of infection, such as:  ¨ Increased pain, swelling, warmth, or redness. ¨ Red streaks leading from the knee. ¨ Pus draining from a place on your knee. ¨ A fever. ? · You have signs of a blood clot in your leg (called a deep vein thrombosis), such as:  ¨ Pain in your calf, back of the knee, thigh, or groin. ¨ Redness and swelling in your leg or groin. ? Watch closely for changes in your health, and be sure to contact your doctor if:  ? · You have tingling, weakness, or numbness in your knee. ? · You have any new symptoms, such as swelling. ? · You have bruises from a knee injury that last longer than 2 weeks. ? · You do not get better as expected. Where can you learn more? Go to http://suzan-kinga.info/. Enter K195 in the search box to learn more about \"Knee Pain or Injury: Care Instructions. \"  Current as of: March 20, 2017  Content Version: 11.4  © 2193-2673 [employer].  Care instructions adapted under license by [employer] (which disclaims liability or warranty for this information). If you have questions about a medical condition or this instruction, always ask your healthcare professional. [address] any warranty or liability for your use of this information.

## 2018-03-30 NOTE — PROGRESS NOTES
Chief Complaint   Patient presents with    Leg Pain     Left       Patient is here today for left leg pain  x 2 day. Pt sts she does allot of going up and down stairs and it has been getting worst over the last two days. Pt is limping today as well. 1. Have you been to the ER, urgent care clinic since your last visit? Hospitalized since your last visit? No    2. Have you seen or consulted any other health care providers outside of the [address] [place] [address] since your last visit? Include any pap smears or colon screening.  No

## 2018-04-02 ENCOUNTER — HOSPITAL ENCOUNTER (OUTPATIENT)
Dept: ULTRASOUND IMAGING | Age: 38
Discharge: HOME | End: 2018-04-02
Attending: NURSE PRACTITIONER
Payer: COMMERCIAL

## 2018-04-02 DIAGNOSIS — M25.562 POSTERIOR LEFT KNEE PAIN: ICD-10-CM

## 2018-04-02 PROCEDURE — 76882 US LMTD JT/FCL EVL NVASC XTR: CPT

## 2018-04-03 NOTE — PROGRESS NOTES
I called Pt in regards to US results. Informed Pt that Results are negative. Pt verbalized understanding.

## 2018-05-08 ENCOUNTER — OFFICE VISIT (OUTPATIENT)
Dept: FAMILY MEDICINE CLINIC | Age: 38
End: 2018-05-08

## 2018-05-08 ENCOUNTER — HOSPITAL ENCOUNTER (OUTPATIENT)
Dept: GENERAL RADIOLOGY | Age: 38
Discharge: HOME | End: 2018-05-08
Payer: COMMERCIAL

## 2018-05-08 VITALS
DIASTOLIC BLOOD PRESSURE: 77 MMHG | WEIGHT: 275.6 LBS | HEART RATE: 76 BPM | TEMPERATURE: 98.5 F | RESPIRATION RATE: 18 BRPM | HEIGHT: 67 IN | SYSTOLIC BLOOD PRESSURE: 131 MMHG | OXYGEN SATURATION: 99 % | BODY MASS INDEX: 43.26 KG/M2

## 2018-05-08 DIAGNOSIS — M25.552 LEFT HIP PAIN: ICD-10-CM

## 2018-05-08 DIAGNOSIS — M25.562 POSTERIOR LEFT KNEE PAIN: ICD-10-CM

## 2018-05-08 DIAGNOSIS — M25.562 POSTERIOR LEFT KNEE PAIN: Primary | ICD-10-CM

## 2018-05-08 PROCEDURE — 73502 X-RAY EXAM HIP UNI 2-3 VIEWS: CPT

## 2018-05-08 PROCEDURE — 73564 X-RAY EXAM KNEE 4 OR MORE: CPT

## 2018-05-08 NOTE — PATIENT INSTRUCTIONS
Leg Pain: Care Instructions  Your Care Instructions  Many things can cause leg pain. Too much exercise or overuse can cause a muscle cramp (or charley horse). You can get leg cramps from not eating a balanced diet that has enough potassium, calcium, and other minerals. If you do not drink enough fluids or are taking certain medicines, you may develop leg cramps. Other causes of leg pain include injuries, blood flow problems, nerve damage, and twisted and enlarged veins (varicose veins). You can usually ease pain with self-care. Your doctor may recommend that you rest your leg and keep it elevated. Follow-up care is a key part of your treatment and safety. Be sure to make and go to all appointments, and call your doctor if you are having problems. It's also a good idea to know your test results and keep a list of the medicines you take. How can you care for yourself at home? · Take pain medicines exactly as directed. ¨ If the doctor gave you a prescription medicine for pain, take it as prescribed. ¨ If you are not taking a prescription pain medicine, ask your doctor if you can take an over-the-counter medicine. · Take any other medicines exactly as prescribed. Call your doctor if you think you are having a problem with your medicine. · Rest your leg while you have pain, and avoid standing for long periods of time. · Prop up your leg at or above the level of your heart when possible. · Make sure you are eating a balanced diet that is rich in calcium, potassium, and magnesium, especially if you are pregnant. · If directed by your doctor, put ice or a cold pack on the area for 10 to 20 minutes at a time. Put a thin cloth between the ice and your skin. · Your leg may be in a splint, a brace, or an elastic bandage, and you may have crutches to help you walk. Follow your doctor's directions about how long to wear supports and how to use the crutches. When should you call for help?   Call 911 anytime you think you may need emergency care. For example, call if:  ? · You have sudden chest pain and shortness of breath, or you cough up blood. ? · Your leg is cool or pale or changes color. ?Call your doctor now or seek immediate medical care if:  ? · You have increasing or severe pain. ? · Your leg suddenly feels weak and you cannot move it. ? · You have signs of a blood clot, such as:  ¨ Pain in your calf, back of the knee, thigh, or groin. ¨ Redness and swelling in your leg or groin. ? · You have signs of infection, such as:  ¨ Increased pain, swelling, warmth, or redness. ¨ Red streaks leading from the sore area. ¨ Pus draining from a place on your leg. ¨ A fever. ? · You cannot bear weight on your leg. ? Watch closely for changes in your health, and be sure to contact your doctor if:  ? · You do not get better as expected. Where can you learn more? Go to http://suzan-kinga.info/. Enter G633 in the search box to learn more about \"Leg Pain: Care Instructions. \"  Current as of: March 20, 2017  Content Version: 11.4  © 0378-9705 [employer]. Care instructions adapted under license by [employer] (which disclaims liability or warranty for this information). If you have questions about a medical condition or this instruction, always ask your healthcare professional. [address] [address] any warranty or liability for your use of this information.

## 2018-05-08 NOTE — MR AVS SNAPSHOT
[facility] 
 
 
 [address] [facility] [address] [address] 
[phone] Patient: Esthela Soler MRN: [id number] :1980 Visit Information Date & Time Provider Department Dept. Phone Encounter #  
 2018  1:30 PM Bean Mauricio, [address] [facility] [phone] [id number] Follow-up Instructions Return if symptoms worsen or fail to improve. Upcoming Health Maintenance Date Due DTaP/Tdap/Td series (1 - Tdap) 10/10/2001 Influenza Age 5 to Adult 10/11/2018* PAP AKA CERVICAL CYTOLOGY 2018 *Topic was postponed. The date shown is not the original due date. Allergies as of 2018  Review Complete On: 2018 By: Bean Mauricio NP No Known Allergies Current Immunizations  Never Reviewed No immunizations on file. Not reviewed this visit You Were Diagnosed With   
  
 Codes Comments Posterior left knee pain    -  Primary ICD-10-CM: M82.720 ICD-9-CM: 719.46 Left hip pain     ICD-10-CM: M25.552 ICD-9-CM: 719.45 Vitals BP Pulse Temp Resp Height(growth percentile) Weight(growth percentile) 131/77 (BP 1 Location: Left arm, BP Patient Position: Sitting) 76 98.5 °F (36.9 °C) (Oral) 18 5' 7\" (1.702 m) 275 lb 9.6 oz (125 kg) LMP SpO2 BMI OB Status Smoking Status 2018 99% 43.17 kg/m2 Having regular periods Never Smoker Vitals History BMI and BSA Data Body Mass Index Body Surface Area  
 43.17 kg/m 2 2.43 m 2 Preferred Pharmacy Pharmacy Name Phone [address] [phone] Your Updated Medication List  
  
   
This list is accurate as of 18  1:52 PM.  Always use your most recent med list.  
  
  
  
  
 cyclobenzaprine 10 mg tablet Commonly known as:  FLEXERIL Take 1 Tab by mouth two (2) times a day. Take as needed for muscle spasm  Indications: MUSCLE SPASM ibuprofen 800 mg tablet Commonly known as:  MOTRIN Take 1 Tab by mouth every eight (8) hours as needed for Pain.  
  
 traMADol 50 mg tablet Commonly known as:  ULTRAM  
Take 1 Tab by mouth every six (6) hours as needed for Pain. Max Daily Amount: 200 mg. Follow-up Instructions Return if symptoms worsen or fail to improve. To-Do List   
 05/08/2018 Imaging:  XR HIP LT W OR WO PELV 2-3 VWS   
  
 05/08/2018 Imaging:  XR KNEE LT MIN 4 V Patient Instructions Leg Pain: Care Instructions Your Care Instructions Many things can cause leg pain. Too much exercise or overuse can cause a muscle cramp (or charley horse). You can get leg cramps from not eating a balanced diet that has enough potassium, calcium, and other minerals. If you do not drink enough fluids or are taking certain medicines, you may develop leg cramps. Other causes of leg pain include injuries, blood flow problems, nerve damage, and twisted and enlarged veins (varicose veins). You can usually ease pain with self-care. Your doctor may recommend that you rest your leg and keep it elevated. Follow-up care is a key part of your treatment and safety. Be sure to make and go to all appointments, and call your doctor if you are having problems. It's also a good idea to know your test results and keep a list of the medicines you take. How can you care for yourself at home? · Take pain medicines exactly as directed. ¨ If the doctor gave you a prescription medicine for pain, take it as prescribed. ¨ If you are not taking a prescription pain medicine, ask your doctor if you can take an over-the-counter medicine. · Take any other medicines exactly as prescribed. Call your doctor if you think you are having a problem with your medicine. · Rest your leg while you have pain, and avoid standing for long periods of time. · Prop up your leg at or above the level of your heart when possible. · Make sure you are eating a balanced diet that is rich in calcium, potassium, and magnesium, especially if you are pregnant. · If directed by your doctor, put ice or a cold pack on the area for 10 to 20 minutes at a time. Put a thin cloth between the ice and your skin. · Your leg may be in a splint, a brace, or an elastic bandage, and you may have crutches to help you walk. Follow your doctor's directions about how long to wear supports and how to use the crutches. When should you call for help? Call 911 anytime you think you may need emergency care. For example, call if: 
? · You have sudden chest pain and shortness of breath, or you cough up blood. ? · Your leg is cool or pale or changes color. ?Call your doctor now or seek immediate medical care if: 
? · You have increasing or severe pain. ? · Your leg suddenly feels weak and you cannot move it. ? · You have signs of a blood clot, such as: 
¨ Pain in your calf, back of the knee, thigh, or groin. ¨ Redness and swelling in your leg or groin. ? · You have signs of infection, such as: 
¨ Increased pain, swelling, warmth, or redness. ¨ Red streaks leading from the sore area. ¨ Pus draining from a place on your leg. ¨ A fever. ? · You cannot bear weight on your leg. ? Watch closely for changes in your health, and be sure to contact your doctor if: 
? · You do not get better as expected. Where can you learn more? Go to http://suzan-kinga.info/. Enter V477 in the search box to learn more about \"Leg Pain: Care Instructions. \" Current as of: March 20, 2017 Content Version: 11.4 © 0751-5656 Chatwala. Care instructions adapted under license by [employer] (which disclaims liability or warranty for this information).  If you have questions about a medical condition or this instruction, always ask your healthcare professional. [employer] disclaims any warranty or liability for your use of this information. Introducing \[facility]\"" & HEALTH SERVICES! Angel Candelaria introduces [employer] patient portal. Now you can access parts of your medical record, email your doctor's office, and request medication refills online. 1. In your internet browser, go to [email or web address] 2. Click on the First Time User? Click Here link in the Sign In box. You will see the New Member Sign Up page. 3. Enter your [employer] Access Code exactly as it appears below. You will not need to use this code after youve completed the sign-up process. If you do not sign up before the expiration date, you must request a new code. · [employer] Access Code: [email or web address] Expires: 6/28/2018  2:41 PM 
 
4. Enter the last four digits of your Social Security Number (xxxx) and Date of Birth (mm/dd/yyyy) as indicated and click Submit. You will be taken to the next sign-up page. 5. Create a [employer] ID. This will be your [employer] login ID and cannot be changed, so think of one that is secure and easy to remember. 6. Create a [employer] password. You can change your password at any time. 7. Enter your Password Reset Question and Answer. This can be used at a later time if you forget your password. 8. Enter your e-mail address. You will receive e-mail notification when new information is available in [address] 9. Click Sign Up. You can now view and download portions of your medical record. 10. Click the Download Summary menu link to download a portable copy of your medical information. If you have questions, please visit the Frequently Asked Questions section of the [employer] website. Remember, [employer] is NOT to be used for urgent needs. For medical emergencies, dial 911. Now available from your iPhone and Android! Please provide this summary of care documentation to your next provider. Your primary care clinician is listed as Haroldo Matthew. If you have any questions after today's visit, please call [phone].

## 2018-05-08 NOTE — PROGRESS NOTES
Chief Complaint   Patient presents with    Leg Pain     left     Patient is here today for left leg pain x 1 mth. Pt sts that the pain radiates from leg up to the thigh into the hip. 1. Have you been to the ER, urgent care clinic since your last visit? Hospitalized since your last visit? No    2. Have you seen or consulted any other health care providers outside of the [facility] since your last visit? Include any pap smears or colon screening. No      HISTORY OF PRESENT ILLNESS    Daxa Le is a 40y.o. year old female here today to follow up for:  Left knee pain and hip pain    Patients symptoms have not improved. States upon awakening in the morning she feels like she has a charley horse improved with taking ibuprofen. Denies weakness. No Known Allergies  Current Outpatient Prescriptions   Medication Sig Dispense Refill    cyclobenzaprine (FLEXERIL) 10 mg tablet Take 1 Tab by mouth two (2) times a day. Take as needed for muscle spasm  Indications: MUSCLE SPASM 60 Tab 2    ibuprofen (MOTRIN) 800 mg tablet Take 1 Tab by mouth every eight (8) hours as needed for Pain. 45 Tab 0    traMADol (ULTRAM) 50 mg tablet Take 1 Tab by mouth every six (6) hours as needed for Pain. Max Daily Amount: 200 mg. 30 Tab 0     No past medical history on file.     ROS:  Review of Systems - General ROS: negative  Respiratory ROS: no cough, shortness of breath, or wheezing  Cardiovascular ROS: no chest pain or dyspnea on exertion  Musculoskeletal ROS: positive for - pain in hip - left and knee - left      Objective:  Visit Vitals    /77 (BP 1 Location: Left arm, BP Patient Position: Sitting)    Pulse 76    Temp 98.5 °F (36.9 °C) (Oral)    Resp 18    Ht 5' 7\" (1.702 m)    Wt 275 lb 9.6 oz (125 kg)    LMP 04/19/2018    SpO2 99%    BMI 43.17 kg/m2     General appearance - alert, well appearing, and in no distress  Neck - supple, no significant adenopathy  Chest - clear to auscultation, no wheezes, rales or rhonchi, symmetric air entry  Heart - normal rate, regular rhythm, normal S1, S2, no murmurs, rubs, clicks or gallops  Musculoskeletal - left hip pain with abduction. Left posterior knee tender to palpation. No edema or warmth present. ICD-10-CM ICD-9-CM    1. Posterior left knee pain M25.562 719.46 XR KNEE LT MIN 4 V   2. Left hip pain M25.552 719.45 XR HIP LT W OR WO PELV 2-3 VWS     I have discussed the diagnosis with the patient and the intended plan as seen in the above orders. The patient has received an after-visit summary and questions were answered concerning future plans. I have discussed medication side effects and warnings with the patient as well. Pt verbalizes understanding. Follow-up Disposition:  Return if symptoms worsen or fail to improve.

## 2018-05-09 ENCOUNTER — HOSPITAL ENCOUNTER (EMERGENCY)
Age: 38
Discharge: HOME | End: 2018-05-09
Attending: EMERGENCY MEDICINE
Payer: COMMERCIAL

## 2018-05-09 VITALS
DIASTOLIC BLOOD PRESSURE: 93 MMHG | TEMPERATURE: 97.8 F | SYSTOLIC BLOOD PRESSURE: 138 MMHG | HEART RATE: 72 BPM | RESPIRATION RATE: 18 BRPM | WEIGHT: 275 LBS | HEIGHT: 67 IN | BODY MASS INDEX: 43.16 KG/M2 | OXYGEN SATURATION: 97 %

## 2018-05-09 DIAGNOSIS — R03.0 ELEVATED BLOOD PRESSURE READING: ICD-10-CM

## 2018-05-09 DIAGNOSIS — M54.40 ACUTE LEFT-SIDED LOW BACK PAIN WITH SCIATICA, SCIATICA LATERALITY UNSPECIFIED: Primary | ICD-10-CM

## 2018-05-09 LAB
ANION GAP SERPL CALC-SCNC: 7 MMOL/L (ref 3–18)
APPEARANCE UR: CLEAR
BASOPHILS # BLD: 0 K/UL (ref 0–0.1)
BASOPHILS NFR BLD: 0 % (ref 0–2)
BILIRUB UR QL: NEGATIVE
BUN SERPL-MCNC: 15 MG/DL (ref 7–18)
BUN/CREAT SERPL: 23 (ref 12–20)
CALCIUM SERPL-MCNC: 8.3 MG/DL (ref 8.5–10.1)
CHLORIDE SERPL-SCNC: 106 MMOL/L (ref 100–108)
CO2 SERPL-SCNC: 25 MMOL/L (ref 21–32)
COLOR UR: YELLOW
CREAT SERPL-MCNC: 0.66 MG/DL (ref 0.6–1.3)
DIFFERENTIAL METHOD BLD: ABNORMAL
EOSINOPHIL # BLD: 0 K/UL (ref 0–0.4)
EOSINOPHIL NFR BLD: 1 % (ref 0–5)
ERYTHROCYTE [DISTWIDTH] IN BLOOD BY AUTOMATED COUNT: 13.9 % (ref 11.6–14.5)
GLUCOSE SERPL-MCNC: 96 MG/DL (ref 74–99)
GLUCOSE UR STRIP.AUTO-MCNC: NEGATIVE MG/DL
HCT VFR BLD AUTO: 34.3 % (ref 35–45)
HGB BLD-MCNC: 11 G/DL (ref 12–16)
HGB UR QL STRIP: NEGATIVE
KETONES UR QL STRIP.AUTO: NEGATIVE MG/DL
LEUKOCYTE ESTERASE UR QL STRIP.AUTO: NEGATIVE
LYMPHOCYTES # BLD: 1 K/UL (ref 0.9–3.6)
LYMPHOCYTES NFR BLD: 22 % (ref 21–52)
MAGNESIUM SERPL-MCNC: 2 MG/DL (ref 1.6–2.6)
MCH RBC QN AUTO: 23.9 PG (ref 24–34)
MCHC RBC AUTO-ENTMCNC: 32.1 G/DL (ref 31–37)
MCV RBC AUTO: 74.4 FL (ref 74–97)
MONOCYTES # BLD: 0.3 K/UL (ref 0.05–1.2)
MONOCYTES NFR BLD: 7 % (ref 3–10)
NEUTS SEG # BLD: 3.2 K/UL (ref 1.8–8)
NEUTS SEG NFR BLD: 70 % (ref 40–73)
NITRITE UR QL STRIP.AUTO: NEGATIVE
PH UR STRIP: 7 [PH] (ref 5–8)
PHOSPHATE SERPL-MCNC: 2.9 MG/DL (ref 2.5–4.9)
PLATELET # BLD AUTO: 340 K/UL (ref 135–420)
PMV BLD AUTO: 9.8 FL (ref 9.2–11.8)
POTASSIUM SERPL-SCNC: 3.9 MMOL/L (ref 3.5–5.5)
PROT UR STRIP-MCNC: NEGATIVE MG/DL
RBC # BLD AUTO: 4.61 M/UL (ref 4.2–5.3)
SODIUM SERPL-SCNC: 138 MMOL/L (ref 136–145)
SP GR UR REFRACTOMETRY: 1.01 (ref 1–1.03)
UROBILINOGEN UR QL STRIP.AUTO: 0.2 EU/DL (ref 0.2–1)
WBC # BLD AUTO: 4.5 K/UL (ref 4.6–13.2)

## 2018-05-09 PROCEDURE — 96374 THER/PROPH/DIAG INJ IV PUSH: CPT

## 2018-05-09 PROCEDURE — 74011250636 HC RX REV CODE- 250/636: Performed by: NURSE PRACTITIONER

## 2018-05-09 PROCEDURE — 99283 EMERGENCY DEPT VISIT LOW MDM: CPT

## 2018-05-09 PROCEDURE — 80048 BASIC METABOLIC PNL TOTAL CA: CPT | Performed by: EMERGENCY MEDICINE

## 2018-05-09 PROCEDURE — 74011250637 HC RX REV CODE- 250/637: Performed by: NURSE PRACTITIONER

## 2018-05-09 PROCEDURE — 85025 COMPLETE CBC W/AUTO DIFF WBC: CPT | Performed by: EMERGENCY MEDICINE

## 2018-05-09 PROCEDURE — 83735 ASSAY OF MAGNESIUM: CPT | Performed by: EMERGENCY MEDICINE

## 2018-05-09 PROCEDURE — 84100 ASSAY OF PHOSPHORUS: CPT | Performed by: EMERGENCY MEDICINE

## 2018-05-09 PROCEDURE — 81003 URINALYSIS AUTO W/O SCOPE: CPT | Performed by: NURSE PRACTITIONER

## 2018-05-09 RX ORDER — CYCLOBENZAPRINE HCL 10 MG
10 TABLET ORAL
Status: COMPLETED | OUTPATIENT
Start: 2018-05-09 | End: 2018-05-09

## 2018-05-09 RX ORDER — CYCLOBENZAPRINE HCL 10 MG
10 TABLET ORAL
Qty: 12 TAB | Refills: 0 | Status: SHIPPED | OUTPATIENT
Start: 2018-05-09 | End: 2018-05-10 | Stop reason: ALTCHOICE

## 2018-05-09 RX ORDER — IBUPROFEN 600 MG/1
600 TABLET ORAL
Qty: 20 TAB | Refills: 0 | Status: SHIPPED | OUTPATIENT
Start: 2018-05-09 | End: 2018-05-15 | Stop reason: SDUPTHER

## 2018-05-09 RX ORDER — HYDROCODONE BITARTRATE AND ACETAMINOPHEN 5; 325 MG/1; MG/1
1 TABLET ORAL
Status: COMPLETED | OUTPATIENT
Start: 2018-05-09 | End: 2018-05-09

## 2018-05-09 RX ORDER — HYDROCODONE BITARTRATE AND ACETAMINOPHEN 5; 325 MG/1; MG/1
1 TABLET ORAL
Qty: 10 TAB | Refills: 0 | Status: SHIPPED | OUTPATIENT
Start: 2018-05-09 | End: 2018-05-10 | Stop reason: ALTCHOICE

## 2018-05-09 RX ORDER — KETOROLAC TROMETHAMINE 30 MG/ML
15 INJECTION, SOLUTION INTRAMUSCULAR; INTRAVENOUS
Status: COMPLETED | OUTPATIENT
Start: 2018-05-09 | End: 2018-05-09

## 2018-05-09 RX ADMIN — KETOROLAC TROMETHAMINE 15 MG: 30 INJECTION, SOLUTION INTRAMUSCULAR; INTRAVENOUS at 06:45

## 2018-05-09 RX ADMIN — CYCLOBENZAPRINE HYDROCHLORIDE 10 MG: 10 TABLET, FILM COATED ORAL at 06:45

## 2018-05-09 RX ADMIN — HYDROCODONE BITARTRATE AND ACETAMINOPHEN 1 TABLET: 5; 325 TABLET ORAL at 07:42

## 2018-05-09 NOTE — ED NOTES
Bedside shift change report given to Michelet (oncoming nurse) by Sparkle Argueta RN (offgoing nurse). Report included the following information SBAR, ED Summary, Procedure Summary, MAR and Recent Results.

## 2018-05-09 NOTE — LETTER
[address] [place] [address] [address] [facility] EMERGENCY DEPT 
[address] [facility] [id number] [phone] Work/School Note Date: 5/9/2018 To Whom It May concern: 
 
Aleja Lepe was seen and treated today in the emergency room by the following provider(s): 
Attending Provider: Keyshawn Del Valle MD 
Nurse Practitioner: Gregorio Kerr NP. Aleja Lepe may return to work on 05/11/2018. Sincerely, Gregorio Kerr NP

## 2018-05-09 NOTE — ED TRIAGE NOTES
C/O left LE pain and intermittent numbness to post. Aspect of LE. States has a had a previous back injury with a \"slipped disc. A/O x 3. NAD. VS stable.

## 2018-05-09 NOTE — DISCHARGE INSTRUCTIONS
Back Pain: Care Instructions  Your Care Instructions    Back pain has many possible causes. It is often related to problems with muscles and ligaments of the back. It may also be related to problems with the nerves, discs, or bones of the back. Moving, lifting, standing, sitting, or sleeping in an awkward way can strain the back. Sometimes you don't notice the injury until later. Arthritis is another common cause of back pain. Although it may hurt a lot, back pain usually improves on its own within several weeks. Most people recover in 12 weeks or less. Using good home treatment and being careful not to stress your back can help you feel better sooner. Follow-up care is a key part of your treatment and safety. Be sure to make and go to all appointments, and call your doctor if you are having problems. It's also a good idea to know your test results and keep a list of the medicines you take. How can you care for yourself at home? · Sit or lie in positions that are most comfortable and reduce your pain. Try one of these positions when you lie down:  ¨ Lie on your back with your knees bent and supported by large pillows. ¨ Lie on the floor with your legs on the seat of a sofa or chair. Mozella Deena on your side with your knees and hips bent and a pillow between your legs. ¨ Lie on your stomach if it does not make pain worse. · Do not sit up in bed, and avoid soft couches and twisted positions. Bed rest can help relieve pain at first, but it delays healing. Avoid bed rest after the first day of back pain. · Change positions every 30 minutes. If you must sit for long periods of time, take breaks from sitting. Get up and walk around, or lie in a comfortable position. · Try using a heating pad on a low or medium setting for 15 to 20 minutes every 2 or 3 hours. Try a warm shower in place of one session with the heating pad. · You can also try an ice pack for 10 to 15 minutes every 2 to 3 hours.  Put a thin cloth between the ice pack and your skin. · Take pain medicines exactly as directed. ¨ If the doctor gave you a prescription medicine for pain, take it as prescribed. ¨ If you are not taking a prescription pain medicine, ask your doctor if you can take an over-the-counter medicine. · Take short walks several times a day. You can start with 5 to 10 minutes, 3 or 4 times a day, and work up to longer walks. Walk on level surfaces and avoid hills and stairs until your back is better. · Return to work and other activities as soon as you can. Continued rest without activity is usually not good for your back. · To prevent future back pain, do exercises to stretch and strengthen your back and stomach. Learn how to use good posture, safe lifting techniques, and proper body mechanics. When should you call for help? Call your doctor now or seek immediate medical care if:  ? · You have new or worsening numbness in your legs. ? · You have new or worsening weakness in your legs. (This could make it hard to stand up.)   ? · You lose control of your bladder or bowels. ? Watch closely for changes in your health, and be sure to contact your doctor if:  ? · Your pain gets worse. ? · You are not getting better after 2 weeks. Where can you learn more? Go to http://suzan-kinga.info/. Enter N563 in the search box to learn more about \"Back Pain: Care Instructions. \"  Current as of: March 21, 2017       Learning About Relief for Back Pain  What is back tension and strain? Back strain happens when you overstretch, or pull, a muscle in your back. You may hurt your back in an accident or when you exercise or lift something. Most back pain will get better with rest and time. You can take care of yourself at home to help your back heal.  What can you do first to relieve back pain? When you first feel back pain, try these steps:  · Walk.  Take a short walk (10 to 20 minutes) on a level surface (no slopes, hills, or stairs) every 2 to 3 hours. Walk only distances you can manage without pain, especially leg pain. · Relax. Find a comfortable position for rest. Some people are comfortable on the floor or a medium-firm bed with a small pillow under their head and another under their knees. Some people prefer to lie on their side with a pillow between their knees. Don't stay in one position for too long. · Try heat or ice. Try using a heating pad on a low or medium setting, or take a warm shower, for 15 to 20 minutes every 2 to 3 hours. Or you can buy single-use heat wraps that last up to 8 hours. You can also try an ice pack for 10 to 15 minutes every 2 to 3 hours. You can use an ice pack or a bag of frozen vegetables wrapped in a thin towel. There is not strong evidence that either heat or ice will help, but you can try them to see if they help. You may also want to try switching between heat and cold. · Take pain medicine exactly as directed. ¨ If the doctor gave you a prescription medicine for pain, take it as prescribed. ¨ If you are not taking a prescription pain medicine, ask your doctor if you can take an over-the-counter medicine. What else can you do? · Stretch and exercise. Exercises that increase flexibility may relieve your pain and make it easier for your muscles to keep your spine in a good, neutral position. And don't forget to keep walking. · Do self-massage. You can use self-massage to unwind after work or school or to energize yourself in the morning. You can easily massage your feet, hands, or neck. Self-massage works best if you are in comfortable clothes and are sitting or lying in a comfortable position. Use oil or lotion to massage bare skin. · Reduce stress. Back pain can lead to a vicious [place]: Distress about the pain tenses the muscles in your back, which in turn causes more pain. Learn how to relax your mind and your muscles to lower your stress. Where can you learn more?   Go to http://madyson.info/. Enter C989 in the search box to learn more about \"Learning About Relief for Back Pain. \"  Current as of: March 21, 2017  Content Version: 11.4  © 2272-4480 [employer]. Care instructions adapted under license by [employer] (which disclaims liability or warranty for this information). If you have questions about a medical condition or this instruction, always ask your healthcare professional. [address] any warranty or liability for your use of this information. Content Version: 11.4  © 7222-2472 [employer]. Care instructions adapted under license by [employer] (which disclaims liability or warranty for this information). If you have questions about a medical condition or this instruction, always ask your healthcare professional. [address] any warranty or liability for your use of this information.

## 2018-05-09 NOTE — ED PROVIDER NOTES
EMERGENCY DEPARTMENT HISTORY AND PHYSICAL EXAM    5:57 AM      Date: 5/9/2018  Patient Name: Nayely Cowan    History of Presenting Illness     Chief Complaint   Patient presents with    Leg Pain         History Provided By: Patient    Chief Complaint: leg pain and numbness  Duration:  Weeks  Timing:  Intermittent  Location: left posterior leg  Quality: Cramping  Severity: 10 out of 10  Modifying Factors: Motrin improves a little  Associated Symptoms: denies any other associated signs or symptoms      Additional History (Context): Nayely Cowan is a 40 y.o. female with No significant past medical history who presents with left leg pain. Patient reports intermittent posterior left leg pain over the last few months but much worse over the last week. Patient had ultrasound of left leg last month and was told it was negative. patient reports burning sensation to posterior left leg, buttock, and into lower back. Patient denies loss of sensation, weakness, loss of bowel or bladder function, use of IV drugs, injury. Patient reports being told she has a slipped disc in her back. Patient works at [employer] and does a lot of heavy lifting. Patient is a nonsmoker. [facility] 04/30    PCP: Cindy Guerrier NP    Current Outpatient Prescriptions   Medication Sig Dispense Refill    ibuprofen (MOTRIN) 600 mg tablet Take 1 Tab by mouth every six (6) hours as needed for Pain. 20 Tab 0    cyclobenzaprine (FLEXERIL) 10 mg tablet Take 1 Tab by mouth three (3) times daily as needed for Muscle Spasm(s). 12 Tab 0    HYDROcodone-acetaminophen (NORCO) 5-325 mg per tablet Take 1 Tab by mouth every six (6) hours as needed for Pain. Max Daily Amount: 4 Tabs. 10 Tab 0       Past History     Past Medical History:  History reviewed. No pertinent past medical history.     Past Surgical History:  Past Surgical History:   Procedure Laterality Date    HX BACK SURGERY      removed abscess       Family History:  Family History   Problem Relation Age of Onset    Stroke Mother 54    Hypertension Mother     High Cholesterol Mother     Hypertension Brother        Social History:  Social History   Substance Use Topics    Smoking status: Never Smoker    Smokeless tobacco: Never Used    Alcohol use No       Allergies:  No Known Allergies      Review of Systems       Review of Systems   Constitutional: Negative for appetite change and fever. HENT: Negative for congestion, rhinorrhea and sore throat. Respiratory: Negative for cough, shortness of breath and wheezing. Cardiovascular: Negative for chest pain and leg swelling. Gastrointestinal: Negative for abdominal pain, constipation, diarrhea, nausea and vomiting. Genitourinary: Negative for dysuria. Musculoskeletal: Positive for arthralgias and myalgias. Negative for back pain. Neurological: Negative for dizziness, syncope and headaches. All other systems reviewed and are negative. Physical Exam     Visit Vitals    BP (!) 138/93 (BP 1 Location: Left arm)    Pulse 72    Temp 97.8 °F (36.6 °C)    Resp 18    Ht 5' 7\" (1.702 m)    Wt 124.7 kg (275 lb)    LMP 04/19/2018    SpO2 97%    BMI 43.07 kg/m2         Physical Exam   Constitutional: She is oriented to person, place, and time. She appears well-developed and well-nourished. Patient appears uncomfortable    HENT:   Head: Atraumatic. Mouth/Throat: Oropharynx is clear and moist.   Poor dental health   Cardiovascular: Normal rate, regular rhythm, normal heart sounds and intact distal pulses. Pulses:       Dorsalis pedis pulses are 2+ on the right side, and 2+ on the left side. Pulmonary/Chest: Effort normal and breath sounds normal. No respiratory distress. She has no wheezes. She has no rales. Musculoskeletal:        Left hip: She exhibits tenderness. She exhibits normal range of motion, normal strength and no bony tenderness. Left knee: She exhibits decreased range of motion.         Lumbar back: She exhibits tenderness, bony tenderness and pain. She exhibits normal pulse. Back:         Left upper leg: She exhibits tenderness. She exhibits no bony tenderness, no swelling, no edema and no deformity. Legs:  Neurological: She is alert and oriented to person, place, and time. She exhibits normal muscle tone. Coordination normal.   Skin: Skin is warm and dry. No rash noted. She is not diaphoretic. No erythema. No pallor. Nursing note and vitals reviewed. Diagnostic Study Results     Labs -  Recent Results (from the past 12 hour(s))   CBC WITH AUTOMATED DIFF    Collection Time: 05/09/18  6:00 AM   Result Value Ref Range    WBC 4.5 (L) 4.6 - 13.2 K/uL    RBC 4.61 4.20 - 5.30 M/uL    HGB 11.0 (L) 12.0 - 16.0 g/dL    HCT 34.3 (L) 35.0 - 45.0 %    MCV 74.4 74.0 - 97.0 FL    MCH 23.9 (L) 24.0 - 34.0 PG    MCHC 32.1 31.0 - 37.0 g/dL    RDW 13.9 11.6 - 14.5 %    PLATELET 790 468 - 979 K/uL    MPV 9.8 9.2 - 11.8 FL    NEUTROPHILS 70 40 - 73 %    LYMPHOCYTES 22 21 - 52 %    MONOCYTES 7 3 - 10 %    EOSINOPHILS 1 0 - 5 %    BASOPHILS 0 0 - 2 %    ABS. NEUTROPHILS 3.2 1.8 - 8.0 K/UL    ABS. LYMPHOCYTES 1.0 0.9 - 3.6 K/UL    ABS. MONOCYTES 0.3 0.05 - 1.2 K/UL    ABS. EOSINOPHILS 0.0 0.0 - 0.4 K/UL    ABS.  BASOPHILS 0.0 0.0 - 0.1 K/UL    DF AUTOMATED     METABOLIC PANEL, BASIC    Collection Time: 05/09/18  6:00 AM   Result Value Ref Range    Sodium 138 136 - 145 mmol/L    Potassium 3.9 3.5 - 5.5 mmol/L    Chloride 106 100 - 108 mmol/L    CO2 25 21 - 32 mmol/L    Anion gap 7 3.0 - 18 mmol/L    Glucose 96 74 - 99 mg/dL    BUN 15 7.0 - 18 MG/DL    Creatinine 0.66 0.6 - 1.3 MG/DL    BUN/Creatinine ratio 23 (H) 12 - 20      GFR est AA >60 >60 ml/min/1.73m2    GFR est non-AA >60 >60 ml/min/1.73m2    Calcium 8.3 (L) 8.5 - 10.1 MG/DL   MAGNESIUM    Collection Time: 05/09/18  6:00 AM   Result Value Ref Range    Magnesium 2.0 1.6 - 2.6 mg/dL   PHOSPHORUS    Collection Time: 05/09/18  6:00 AM   Result Value Ref Range Phosphorus 2.9 2.5 - 4.9 MG/DL   URINALYSIS W/ RFLX MICROSCOPIC    Collection Time: 05/09/18  7:28 AM   Result Value Ref Range    Color YELLOW      Appearance CLEAR      Specific gravity 1.014 1.005 - 1.030      pH (UA) 7.0 5.0 - 8.0      Protein NEGATIVE  NEG mg/dL    Glucose NEGATIVE  NEG mg/dL    Ketone NEGATIVE  NEG mg/dL    Bilirubin NEGATIVE  NEG      Blood NEGATIVE  NEG      Urobilinogen 0.2 0.2 - 1.0 EU/dL    Nitrites NEGATIVE  NEG      Leukocyte Esterase NEGATIVE  NEG         Radiologic Studies -   No orders to display         Medical Decision Making   I am the first provider for this patient. I reviewed the vital signs, available nursing notes, past medical history, past surgical history, family history and social history. Vital Signs-Reviewed the patient's vital signs. Records Reviewed: Old Medical Records (Time of Review: 5:57 AM)        Provider Notes (Medical Decision Making):   MDM  Number of Diagnoses or Management Options  Acute left-sided low back pain with sciatica, sciatica laterality unspecified:   Elevated blood pressure reading:   Diagnosis management comments: Differential Diagnosis: Musculoskeletal pain, myofascial strain/sprain, muscle spasm, spondylolisthesis, spondylosis, DJD, OA, sciatica, aortic aneurysm, vertebral infection, renal colic, pyelonephritis, epidural abscess, epidural hematoma, herniated nucleus pulposis, malignancy    Plan:  Labs ordered by attending, discussed need for emergent imagining. Patient has had these symptoms intermittently for months, no fever or IV drug use, no loss of sensation or strength, no indication for emergent MRI to evaluate for abscess and no indication for plan film due to no injury. Negative ultrasound 1 month ago evaluating for DVT  Lumbar xray in 7/17 results as follows-  IMPRESSION:      Moderate narrowing of the apparent L5-S1 disc space. No compression fracture.   No listhesis on flexion or extension although there does not appear to be  significant change in patient positioning between the multiple views. 6:52 AM re-evaluated patient's pain, she has not received medication yet. 8:13 AM No evidence of UTI, no concerning CBC findings, consistent with previous, mag is WNL. 8:14 AM patient's pain is improved, able to ambulate. No neuro deficits, no evidence of spinal abscess, patient well appearing. Patient referred to orthopedist for further evaluation, discussed possible need for nonemergent MRI for lower back pain, chronic. Discussed nonpharm interventions. Short course of pain medication and muscle relaxers given. Patient referred to PCP for further evaluation of elevated blood pressure. Patient educated to return to the ED for any new or worsening symptoms. Patient denies questions. Amount and/or Complexity of Data Reviewed  Clinical lab tests: ordered and reviewed  Discuss the patient with other providers: yes (Dr Bonnie Shukla )    One or more blood pressure readings were noted elevated during the Pt's presentation in the emergency department this date. This abnormal reading has been cited in the Pt's diagnosis, and they have been encouraged to follow up with their primary care physician, or referred to a consultant for further evaluation and treatment. Diagnosis     Clinical Impression:   1. Acute left-sided low back pain with sciatica, sciatica laterality unspecified    2.  Elevated blood pressure reading        Disposition: Discharged     Follow-up Information     Follow up With Details Comments [address], NP Schedule an appointment as soon as possible for a visit in 1 week Further evaluation [address]  [address] [facility]      [address] [facility], [address] and [facility] Schedule an appointment as soon as possible for a visit in 3 days Further evaluation [address] [facility], [address] [address]  [phone]           Patient's Medications Start Taking    CYCLOBENZAPRINE (FLEXERIL) 10 MG TABLET    Take 1 Tab by mouth three (3) times daily as needed for Muscle Spasm(s). HYDROCODONE-ACETAMINOPHEN (NORCO) 5-325 MG PER TABLET    Take 1 Tab by mouth every six (6) hours as needed for Pain. Max Daily Amount: 4 Tabs. IBUPROFEN (MOTRIN) 600 MG TABLET    Take 1 Tab by mouth every six (6) hours as needed for Pain. Continue Taking    No medications on file   These Medications have changed    No medications on file   Stop Taking    CYCLOBENZAPRINE (FLEXERIL) 10 MG TABLET    Take 1 Tab by mouth two (2) times a day. Take as needed for muscle spasm  Indications: MUSCLE SPASM    IBUPROFEN (MOTRIN) 800 MG TABLET    Take 1 Tab by mouth every eight (8) hours as needed for Pain. TRAMADOL (ULTRAM) 50 MG TABLET    Take 1 Tab by mouth every six (6) hours as needed for Pain.  Max Daily Amount: 200 mg.     _______________________________

## 2018-05-10 ENCOUNTER — OFFICE VISIT (OUTPATIENT)
Dept: FAMILY MEDICINE CLINIC | Age: 38
End: 2018-05-10

## 2018-05-10 VITALS
RESPIRATION RATE: 18 BRPM | TEMPERATURE: 97.6 F | WEIGHT: 267 LBS | BODY MASS INDEX: 41.91 KG/M2 | HEIGHT: 67 IN | OXYGEN SATURATION: 99 % | DIASTOLIC BLOOD PRESSURE: 70 MMHG | HEART RATE: 84 BPM | SYSTOLIC BLOOD PRESSURE: 110 MMHG

## 2018-05-10 DIAGNOSIS — G89.29 CHRONIC LOW BACK PAIN WITHOUT SCIATICA, UNSPECIFIED BACK PAIN LATERALITY: ICD-10-CM

## 2018-05-10 DIAGNOSIS — M25.562 POSTERIOR LEFT KNEE PAIN: Primary | ICD-10-CM

## 2018-05-10 DIAGNOSIS — M25.552 LEFT HIP PAIN: ICD-10-CM

## 2018-05-10 DIAGNOSIS — M54.50 CHRONIC LOW BACK PAIN WITHOUT SCIATICA, UNSPECIFIED BACK PAIN LATERALITY: ICD-10-CM

## 2018-05-10 RX ORDER — TRAMADOL HYDROCHLORIDE 50 MG/1
50 TABLET ORAL
Qty: 60 TAB | Refills: 0 | Status: SHIPPED | OUTPATIENT
Start: 2018-05-10 | End: 2019-04-25

## 2018-05-10 NOTE — ACP (ADVANCE CARE PLANNING)
Advance Care Planning (ACP) Provider Conversation Snapshot    Date of ACP Conversation: 05/10/18  Persons included in Conversation:  patient  Length of ACP Conversation in minutes:  <16 minutes (Non-Billable)    Authorized Decision Maker (if patient is incapable of making informed decisions): This person is:   Healthcare Agent/Medical Power of  under Advance Directive          For Patients with Decision Making Capacity:   Values/Goals: Exploration of values, goals, and preferences if recovery is not expected, even with continued medical treatment in the event of:  Imminent death  Severe, permanent brain injury  \"In these circumstances, what matters most to you? \"  Pt will discuss with family.     Conversation Outcomes / Follow-Up Plan:   Recommended completion of Advance Directive form after review of ACP materials and conversation with prospective healthcare agent

## 2018-05-10 NOTE — MR AVS SNAPSHOT
[address] [facility] 
 
 
 [address] [address] [address] [facility] [address] 
[phone] Patient: Leonor Avitia MRN: [id number] :1980 Visit Information Date & Time Provider Department Dept. Phone Encounter #  
 5/10/2018 10:45 AM NOIN Vargas Capdaron Justice [address] [facility] [id number] Your Appointments 5/15/2018 12:50 PM  
Follow Up with Khai Cee MD  
[facility]) Appt Note: [facility] ED 18 low back and leg pain [address] [facility] [id number] [phone]  
  
   
 [address] [facility] [address] Upcoming Health Maintenance Date Due DTaP/Tdap/Td series (1 - Tdap) 10/10/2001 Influenza Age 5 to Adult 10/11/2018* PAP AKA CERVICAL CYTOLOGY 2018 *Topic was postponed. The date shown is not the original due date. Allergies as of 5/10/2018  Review Complete On: 5/10/2018 By: Alicia Lindo NP No Known Allergies Current Immunizations  Never Reviewed No immunizations on file. Not reviewed this visit You Were Diagnosed With   
  
 Codes Comments Posterior left knee pain    -  Primary ICD-10-CM: S54.942 ICD-9-CM: 719.46 Left hip pain     ICD-10-CM: M25.552 ICD-9-CM: 719.45 Chronic low back pain without sciatica, unspecified back pain laterality     ICD-10-CM: M54.5, G89.29 ICD-9-CM: 724.2, 338.29 Vitals BP Pulse Temp Resp Height(growth percentile) Weight(growth percentile) 110/70 (BP 1 Location: Left arm, BP Patient Position: Sitting) 84 97.6 °F (36.4 °C) 18 5' 7\" (1.702 m) 267 lb (121.1 kg) LMP SpO2 BMI OB Status Smoking Status 2018 (Approximate) 99% 41.82 kg/m2 Having regular periods Never Smoker Vitals History BMI and BSA Data Body Mass Index Body Surface Area  
 41.82 kg/m 2 2.39 m 2 Preferred Pharmacy Pharmacy Name Phone Desirae Pires [address] [place] [address]. [phone] Your Updated Medication List  
  
   
This list is accurate as of 5/10/18 11:03 AM.  Always use your most recent med list.  
  
  
  
  
 ibuprofen 600 mg tablet Commonly known as:  MOTRIN Take 1 Tab by mouth every six (6) hours as needed for Pain.  
  
 traMADol 50 mg tablet Commonly known as:  ULTRAM  
Take 1 Tab by mouth every six (6) hours as needed for Pain. Max Daily Amount: 200 mg. Prescriptions Printed Refills  
 traMADol (ULTRAM) 50 mg tablet 0 Sig: Take 1 Tab by mouth every six (6) hours as needed for Pain. Max Daily Amount: 200 mg. Class: Print Route: Oral  
  
We Performed the Following REFERRAL TO PHYSICAL THERAPY [OUX32 Custom] Referral Information Referral ID Referred By Referred To  
  
 [id number] [address] Phone: [phone] Visits Status Start Date End Date 1 New Request 5/10/18 5/10/19 If your referral has a status of pending review or denied, additional information will be sent to support the outcome of this decision. Introducing [facility]! John Vargas introduces [employer] patient portal. Now you can access parts of your medical record, email your doctor's office, and request medication refills online. 1. In your internet browser, go to [email or web address] - [employer] New [email or web address] 2. Click on the First Time User? Click Here link in the Sign In box. You will see the New Member Sign Up page. 3. Enter your [employer] Access Code exactly as it appears below. You will not need to use this code after youve completed the sign-up process. If you do not sign up before the expiration date, you must request a new code. · [employer] Access Code: [email or web address] Expires: 6/28/2018  2:41 PM 
 
 4. Enter the last four digits of your Social Security Number (xxxx) and Date of Birth (mm/dd/yyyy) as indicated and click Submit. You will be taken to the next sign-up page. 5. Create a [facility] ID. This will be your [facility] login ID and cannot be changed, so think of one that is secure and easy to remember. 6. Create a [facility] password. You can change your password at any time. 7. Enter your Password Reset Question and Answer. This can be used at a later time if you forget your password. 8. Enter your e-mail address. You will receive e-mail notification when new information is available in [address] 9. Click Sign Up. You can now view and download portions of your medical record. 10. Click the Download Summary menu link to download a portable copy of your medical information. If you have questions, please visit the Frequently Asked Questions section of the [facility] website. Remember, [facility] is NOT to be used for urgent needs. For medical emergencies, dial 911. Now available from your iPhone and Android! Please provide this summary of care documentation to your next provider. Your primary care clinician is listed as Levester Dock. If you have any questions after today's visit, please call [phone].

## 2018-05-10 NOTE — PROGRESS NOTES
Chief Complaint   Patient presents with    Other     ED follow up [facility] for Leg cramps 5/9/18     1. Have you been to the ER, urgent care clinic since your last visit? Hospitalized since your last visit? No    2. Have you seen or consulted any other health care providers outside of the [address] [place] [address] since your last visit? Include any pap smears or colon screening.  No

## 2018-05-10 NOTE — PROGRESS NOTES
HISTORY OF PRESENT ILLNESS  Wilman Andujar is a 40 y.o. female. Patient presents for follow up ED visit. HPI  Pt was seen here on 5-8-18 by Mario Lakhani and on 5-9-18, pt went to ED. Pt states she wakes up in pain. Pt had a x-ray of her left knee and left hip done ordered by Mario Lakhani. Review of Systems   Constitutional: Negative. HENT: Negative. Eyes: Negative. Respiratory: Negative. Cardiovascular: Negative. Musculoskeletal: Positive for joint pain (chronic left hip and left knee pain.). Visit Vitals    /70 (BP 1 Location: Left arm, BP Patient Position: Sitting)    Pulse 84    Temp 97.6 °F (36.4 °C)    Resp 18    Ht 5' 7\" (1.702 m)    Wt 267 lb (121.1 kg)    LMP 04/19/2018 (Approximate)    SpO2 99%    BMI 41.82 kg/m2       Physical Exam   Constitutional: She appears well-developed. No distress. Cardiovascular: Normal rate, regular rhythm and normal heart sounds. Pulmonary/Chest: Effort normal and breath sounds normal. No respiratory distress. She has no wheezes. She has no rales. Musculoskeletal:        Right hip: Normal.        Left hip: She exhibits tenderness. She exhibits normal range of motion. Right knee: Normal.        Left knee: She exhibits decreased range of motion. She exhibits no swelling, no effusion and no erythema. Tenderness found. ASSESSMENT and PLAN    ICD-10-CM ICD-9-CM    1. Posterior left knee pain M25.562 719.46    2. Left hip pain M25.552 719.45    3. Chronic low back pain without sciatica, unspecified back pain laterality M54.5 724.2     G89.29 338.29      PLAN:  We discussed her mild osteoarthritis and that weight loss would help improve the symptoms she is having. I did not give her any further narcotics and I explained to her that is because this is going to be a life long condition so narcotics are addictive. Pt was given acp to review. Pt given after visit summary.

## 2018-05-14 ENCOUNTER — TELEPHONE (OUTPATIENT)
Dept: FAMILY MEDICINE CLINIC | Age: 38
End: 2018-05-14

## 2018-05-14 NOTE — TELEPHONE ENCOUNTER
Pt called wanting to be sure that a copy of the paperwork would be copied for her own records after they are faxed. Please be aware.

## 2018-05-14 NOTE — TELEPHONE ENCOUNTER
Pt has completed a form for form regarding FMLA paperwork she needs ASAP leaving with NP Jose Ramon nurse.

## 2018-05-15 ENCOUNTER — OFFICE VISIT (OUTPATIENT)
Dept: ORTHOPEDIC SURGERY | Age: 38
End: 2018-05-15

## 2018-05-15 VITALS
TEMPERATURE: 98.3 F | WEIGHT: 273 LBS | BODY MASS INDEX: 42.85 KG/M2 | SYSTOLIC BLOOD PRESSURE: 121 MMHG | OXYGEN SATURATION: 99 % | HEART RATE: 85 BPM | DIASTOLIC BLOOD PRESSURE: 87 MMHG | HEIGHT: 67 IN

## 2018-05-15 DIAGNOSIS — M16.12 OSTEOARTHRITIS OF LEFT HIP, UNSPECIFIED OSTEOARTHRITIS TYPE: ICD-10-CM

## 2018-05-15 DIAGNOSIS — E66.01 OBESITY, MORBID (HCC): ICD-10-CM

## 2018-05-15 DIAGNOSIS — M79.2 NEURITIS: ICD-10-CM

## 2018-05-15 DIAGNOSIS — M51.36 DDD (DEGENERATIVE DISC DISEASE), LUMBAR: Primary | ICD-10-CM

## 2018-05-15 DIAGNOSIS — R29.898 LEFT LEG WEAKNESS: ICD-10-CM

## 2018-05-15 RX ORDER — IBUPROFEN 600 MG/1
600 TABLET ORAL
Qty: 20 TAB | Refills: 0 | Status: SHIPPED | OUTPATIENT
Start: 2018-05-15 | End: 2019-04-25

## 2018-05-15 RX ORDER — ACETAMINOPHEN 325 MG/1
TABLET ORAL
COMMUNITY

## 2018-05-15 RX ORDER — GABAPENTIN 300 MG/1
CAPSULE ORAL
Qty: 90 CAP | Refills: 1 | Status: SHIPPED | OUTPATIENT
Start: 2018-05-15 | End: 2019-04-25

## 2018-05-15 NOTE — PROGRESS NOTES
[facility]  [address] [place], [facility] [address]  Phone: [phone]  Fax: [phone]    Pt's YOB: 1980    ASSESSMENT   Diagnoses and all orders for this visit:    1. DDD (degenerative disc disease), lumbar  -     ibuprofen (MOTRIN) 600 mg tablet; Take 1 Tab by mouth every six (6) hours as needed for Pain. 2. Neuritis  -     gabapentin (NEURONTIN) 300 mg capsule; 1 in the am and 2 in the evening as directed  Indications: NEUROPATHIC PAIN    3. Left leg weakness  -     gabapentin (NEURONTIN) 300 mg capsule; 1 in the am and 2 in the evening as directed  Indications: NEUROPATHIC PAIN    4. Osteoarthritis of left hip, unspecified osteoarthritis type    5. Obesity, morbid ([address]         IMPRESSION AND PLAN:   Candance Fill is a 40 y.o. female with history of lumbar pain. Pt woke up about 1 week ago with new pain radiating down the left leg. She also complains of numbness in the toes on the left. She has been taking Ultram 50 mg as needed and ran out of ibuprofen 800 mg.    1) Pt was given information on lumbar arthritis and herniated disc exercises. 2) She was prescribed Neurontin 300 mg 1 tab QAM and 2 tabs QHS, tapering up as directed. 3) Pt was informed of proper lifting mechanics  4) I encouraged the patient to change positions regularly; weight loss also discussed   5) Pt received a refill of ibuprofen 600 mg 1 tab Q6 hours prn pain. 6) I will consider ordering a lumbar MRI pending persistent symptoms. 7) Ms. Daxa Singer has a reminder for a \"due or due soon\" health maintenance. I have asked that she contact her primary care provider, Amadeo Maciel NP, for follow-up on this health maintenance. 8)  demonstrated consistency with prescribing. 9) Pt will follow-up in 1 month or sooner if needed. HISTORY OF PRESENT ILLNESS  Wilman Singer is a 40 y.o. female with history of lumbar pain.  She was last seen in 08/2018 for lower back pain without radicular symptoms. At her last office visit she had experienced significant improvement with physical therapy and was not longer taking Flexeril 10 or ibuprofen. Pt woke up about 1 week ago with new pain radiating down the left leg. She denies any inciting injuries. Pt states that her pain progressed and on Wednesday she started to experiencing pain radiating down the leg past the knee. She admits to difficulty standing and walking due to the left leg pain. Pt reports numbness in the toes on the left. She admits to pain when laying in bed and changing positions. Of note, she went to the ER on 05/09/2018 to the left leg pain and was referred to physical therapy. Pt states that the earliest she could schedule therapy sessions was on 05/23/2018. She has been taking Ultram as prescribed by NP [place] Dies. Pt admits to relief when taking ibuprofen in the past but states that she has run out of the medication. Pt at this time desires to proceed with medication evaluation. Pain Scale: 5/10    PCP: Jaswinder Casas NP     History reviewed. No pertinent past medical history. Social History     Social History    Marital status:      Spouse name: N/A    Number of children: N/A    Years of education: N/A     Occupational History    Not on file. Social History Main Topics    Smoking status: Never Smoker    Smokeless tobacco: Never Used    Alcohol use No    Drug use: No    Sexual activity: Yes     Partners: Male     Birth control/ protection: None     Other Topics Concern    Caffeine Concern Yes     daily 12oz    Sleep Concern No    Stress Concern No    Weight Concern No    Exercise No    Seat Belt No    Self-Exams Yes     Social History Narrative       Current Outpatient Prescriptions   Medication Sig Dispense Refill    acetaminophen (TYLENOL) 325 mg tablet Take  by mouth every four (4) hours as needed for Pain.       ibuprofen (MOTRIN) 600 mg tablet Take 1 Tab by mouth every six (6) hours as needed for Pain. 20 Tab 0    gabapentin (NEURONTIN) 300 mg capsule 1 in the am and 2 in the evening as directed  Indications: NEUROPATHIC PAIN 90 Cap 1    traMADol (ULTRAM) 50 mg tablet Take 1 Tab by mouth every six (6) hours as needed for Pain. Max Daily Amount: 200 mg. 60 Tab 0       No Known Allergies      REVIEW OF SYSTEMS    Constitutional: Negative for fever, chills, or weight change. Respiratory: Negative for cough or shortness of breath. Cardiovascular: Negative for chest pain or palpitations. Gastrointestinal: Negative for acid reflux, change in bowel habits, or constipation. Genitourinary: Negative for dysuria and flank pain. Musculoskeletal: Positive for lumbar pain and left leg weakness  Skin: Negative for rash. Neurological: Positive for numbness in the toes on the left. Negative for headaches or dizziness. Endo/Heme/Allergies: Negative for increased bruising. Psychiatric/Behavioral: Negative for difficulty with sleep. PHYSICAL EXAMINATION  Visit Vitals    /87    Pulse 85    Temp 98.3 °F (36.8 °C) (Oral)    Ht 5' 7\" (1.702 m)    Wt 273 lb (123.8 kg)    LMP 04/19/2018 (Approximate)    SpO2 99%    BMI 42.76 kg/m2       Constitutional: Awake, alert, and in no acute distress. Neurological: 1+ symmetrical DTRs in the lower extremities. Sensation to light touch is intact. Skin: warm, dry, and intact. Musculoskeletal: Tenderness to palpation in the lower lumbar region. Moderate pain with extension, axial loading, and forward flexion. No pain with internal or external rotation of her hips. Positive straight leg raise on the left. Hip Flex  Quads Hamstrings Ankle DF EHL Ankle PF   Right +4/5 +4/5 +4/5 +4/5 +4/5 +4/5   Left +4/5 -4/5 -4/5  4/5 +4/5  4/5     IMAGING:    Left knee x-rays from 05/08/2018 were personally reviewed with the patient and demonstrated:  FINDINGS: No joint effusion appreciated.  The alignment of the knee is  unremarkable. No evidence of acute fracture or dislocation. Minimal medial  lateral compartment osteophyte formation are noted. There is no evidence of  erosions or periostitis. No lytic or blastic focus appreciated. The soft tissues  are unremarkable.     IMPRESSION:  1. No acute pathology appreciated in the left knee.     2.  Mild osteoarthritis. Left hip x-rays from 05/08/2018 were personally reviewed with the patient and demonstrated:  FINDINGS:   No evidence of acute fracture of the pelvis. The SI joints demonstrate  subchondral sclerosis and osteophyte formation. The pubic symphysis is  unremarkable. The femoral heads are well aligned with the osseous pelvis. No  evidence of hip fracture or dislocation. The left hip demonstrates mild  superolateral joint space narrowing and mild osteophyte formation.     IMPRESSION:  1. Mild left hip osteoarthritis.     2.  Mild SI joint osteoarthritis. Lumbar spine x-rays from 06/02/2017 were personally reviewed with the Pt and demonstrated:  Results from Hospital Encounter on 06/02/17   XR SPINE LUMBAR BEND/FLEXION EXTENSION    Narrative Lumbar spine lateral with flexion and extension    HISTORY: Low back pain for 3 to 4 days without radiation into the lower  extremities. COMPARISON: None. FINDINGS:     4 standing lateral views obtained including neutral lateral view, lateral view  in extension and flexion and cone-down lateral view. There is limited change in  patient positioning between the films. Vertebral heights maintained. Moderate  narrowing of the apparent L5-S1 disc space. Normal alignment on the neutral  lateral view. No listhesis on flexion or extension.            Impression IMPRESSION:     Moderate narrowing of the apparent L5-S1 disc space. No compression fracture. No listhesis on flexion or extension although there does not appear to be  significant change in patient positioning between the multiple views.         Written by Adeel Eboni Fisher, as dictated by Alize Choi MD.  I, Dr. Alize Choi confirm that all documentation is accurate.

## 2018-05-15 NOTE — PATIENT INSTRUCTIONS
Low Back Arthritis: Exercises  Your Care Instructions  Here are some examples of typical rehabilitation exercises for your condition. Start each exercise slowly. Ease off the exercise if you start to have pain. Your doctor or physical therapist will tell you when you can start these exercises and which ones will work best for you. When you are not being active, find a comfortable position for rest. Some people are comfortable on the floor or a medium-firm bed with a small pillow under their head and another under their knees. Some people prefer to lie on their side with a pillow between their knees. Don't stay in one position for too long. Take short walks (10 to 20 minutes) every 2 to 3 hours. Avoid slopes, hills, and stairs until you feel better. Walk only distances you can manage without pain, especially leg pain. How to do the exercises  Pelvic tilt    1. Lie on your back with your knees bent. 2. \"Brace\" your stomach-tighten your muscles by pulling in and imagining your belly button moving toward your spine. 3. Press your lower back into the floor. You should feel your hips and pelvis rock back. 4. Hold for 6 seconds while breathing smoothly. 5. Relax and allow your pelvis and hips to rock forward. 6. Repeat 8 to 12 times. Back stretches    1. Get down on your hands and knees on the floor. 2. Relax your head and allow it to droop. Round your back up toward the ceiling until you feel a nice stretch in your upper, middle, and lower back. Hold this stretch for as long as it feels comfortable, or about 15 to 30 seconds. 3. Return to the starting position with a flat back while you are on your hands and knees. 4. Let your back sway by pressing your stomach toward the floor. Lift your buttocks toward the ceiling. 5. Hold this position for 15 to 30 seconds. 6. Repeat 2 to 4 times. Follow-up care is a key part of your treatment and safety.  Be sure to make and go to all appointments, and call your doctor if you are having problems. It's also a good idea to know your test results and keep a list of the medicines you take. Where can you learn more? Go to http://suzan-kinga.info/. Enter H086 in the search box to learn more about \"Low Back Arthritis: Exercises. \"  Current as of: March 21, 2017  Content Version: 11.4  © 4597-6945 [employer]. Care instructions adapted under license by [employer] (which disclaims liability or warranty for this information). If you have questions about a medical condition or this instruction, always ask your healthcare professional. [place] [address] any warranty or liability for your use of this information. Herniated Disc: Exercises  Your Care Instructions  Here are some examples of typical rehabilitation exercises for your condition. Start each exercise slowly. Ease off the exercise if you start to have pain. Your doctor or physical therapist will tell you when you can start these exercises and which ones will work best for you. Back Exercises  These exercises can help you move easier and feel better. But when you first start doing them, you may have more pain in your back. This is normal. But it is important to pay close attention to your pain during and after each exercise. · Keep doing these exercises if your pain stays the same or moves from your leg and buttock more toward the middle of your spine. Pain moving out of your leg and buttock is a good sign. · Stop doing these exercises if your pain gets worse in your leg and buttock. Stop if you start to have pain in your leg and buttock that you didn't have before. Be sure to do these exercises in the order they appear. Note how your pain changes before you move to the next one. If your pain is much worse right after exercise and stays worse the next day, do not do any of these exercises. How to do the exercises  1. Rest on belly    7.  Lie on your stomach, face down, with your head turned to the side. Place your arms beside your body. If this bothers your neck, place your hands, one on top of the other, underneath your forehead. This will help support your head and neck. 8. Try to relax your lower back muscles as much as you can. 9. Continue to lie on your stomach for 2 minutes. 10. If your pain spreads down your leg or increases down your leg, stop this exercise and do not do the next exercises. 2. Press-up    1. Lie on your stomach, face down. Keep your elbows tucked into your sides and under your shoulders. 2. Press your elbows down into the floor to raise your upper back. As you do this, relax your stomach muscles. Allow your back to arch without using your back muscles. Let your low back relax completely as you arch up. 3. Hold this position for 2 minutes. 4. Repeat 2 to 4 times. 5. If your pain spreads down your leg or increases down your leg, stop this exercise and do not do the next exercises. 3. Full press-up    1. Lie on your stomach, face down. Keep your elbows tucked into your sides and under your shoulders. 2. Straighten your elbows, and push your upper body up as far as you can. Allow your lower back to sag. Keep your hips, pelvis, and legs relaxed. 3. Hold this position for 5 seconds, and then relax. 4. Repeat 10 times. Each time, try to raise your upper body a little higher and hold your arms a bit straighter. 5. If your pain spreads down your leg or gets worse down your leg, stop this exercise and do not move to the next exercise. 6. If you can't do this exercise, you may instead try the backward bend exercise that follows. 4. Backward bend    1. Stand with your feet hip-width apart. Your toes should point forward. Do not lock your knees. 2. Place your hands in the small of your back. 3. Bend backward as far as you can, keeping your knees straight. Hold this position for 2 to 3 seconds.  Then return to your starting position. 4. Repeat 2 to 4 times. Each time, try to bend backward a little farther, until you bend backward as far as you can. 5. If your pain spreads down your leg or increases down your leg, stop this exercise. Follow-up care is a key part of your treatment and safety. Be sure to make and go to all appointments, and call your doctor if you are having problems. It's also a good idea to know your test results and keep a list of the medicines you take. Where can you learn more? Go to http://suzan-kinga.info/. Enter 12638 [id number] in the search box to learn more about \"Herniated Disc: Exercises. \"  Current as of: March 21, 2017  Content Version: 11.4  © 3291-9521 [employer]. Care instructions adapted under license by [employer] (which disclaims liability or warranty for this information). If you have questions about a medical condition or this instruction, always ask your healthcare professional. [place] [address] any warranty or liability for your use of this information.

## 2018-05-15 NOTE — MR AVS SNAPSHOT
Carisa Wolf 
 
 
 [address] [facility] [address] [facility] [address] 
[phone] Patient: Esthela Soler MRN: [id number] :1980 Visit Information Date & Time Provider Department Dept. Phone Encounter #  
 5/15/2018 12:50 PM Jimi Ardon MD [facility] [phone] [id number] Follow-up Instructions Return in about 1 month (around 6/15/2018) for PT follow up, Medication follow up. Routing History Upcoming Health Maintenance Date Due DTaP/Tdap/Td series (1 - Tdap) 10/10/2001 PAP AKA CERVICAL CYTOLOGY 2018 Influenza Age 5 to Adult 10/11/2018* *Topic was postponed. The date shown is not the original due date. Allergies as of 5/15/2018  Review Complete On: 5/15/2018 By: Dayne Jean LPN No Known Allergies Current Immunizations  Never Reviewed No immunizations on file. Not reviewed this visit You Were Diagnosed With   
  
 Codes Comments DDD (degenerative disc disease), lumbar    -  Primary ICD-10-CM: M51.36 
ICD-9-CM: 722.52 Neuritis     ICD-10-CM: M79.2 ICD-9-CM: 729.2 Left leg weakness     ICD-10-CM: R29.898 ICD-9-CM: 729.89 Vitals BP Pulse Temp Height(growth percentile) Weight(growth percentile) LMP  
 121/87 85 98.3 °F (36.8 °C) (Oral) 5' 7\" (1.702 m) 273 lb (123.8 kg) 2018 (Approximate) SpO2 BMI OB Status Smoking Status 99% 42.76 kg/m2 Having regular periods Never Smoker Vitals History BMI and BSA Data Body Mass Index Body Surface Area 42.76 kg/m 2 2.42 m 2 Preferred Pharmacy Pharmacy Name Phone [address] [phone] Your Updated Medication List  
  
   
This list is accurate as of 5/15/18  1:31 PM.  Always use your most recent med list.  
  
  
  
  
 gabapentin 300 mg capsule Commonly known as:  NEURONTIN  
 1 in the am and 2 in the evening as directed  Indications: NEUROPATHIC PAIN  
  
 ibuprofen 600 mg tablet Commonly known as:  MOTRIN Take 1 Tab by mouth every six (6) hours as needed for Pain.  
  
 traMADol 50 mg tablet Commonly known as:  ULTRAM  
Take 1 Tab by mouth every six (6) hours as needed for Pain. Max Daily Amount: 200 mg.  
  
 TYLENOL 325 mg tablet Generic drug:  acetaminophen Take  by mouth every four (4) hours as needed for Pain. Prescriptions Sent to Pharmacy Refills  
 ibuprofen (MOTRIN) 600 mg tablet 0 Sig: Take 1 Tab by mouth every six (6) hours as needed for Pain. Class: Normal  
 Pharmacy: [facility] [employer] [facility]. Ph #: [phone] Route: Oral  
 gabapentin (NEURONTIN) 300 mg capsule 1 Si in the am and 2 in the evening as directed  Indications: NEUROPATHIC PAIN Class: Normal  
 Pharmacy: [facility] [place] [address]. Ph #: [phone] Follow-up Instructions Return in about 1 month (around 6/15/2018) for PT follow up, Medication follow up. To-Do List   
 2018 5:00 PM  
  Appointment with Oli Fenton PT at [facility] ([phone]) Patient Instructions Low Back Arthritis: Exercises Your Care Instructions Here are some examples of typical rehabilitation exercises for your condition. Start each exercise slowly. Ease off the exercise if you start to have pain. Your doctor or physical therapist will tell you when you can start these exercises and which ones will work best for you. When you are not being active, find a comfortable position for rest. Some people are comfortable on the floor or a medium-firm bed with a small pillow under their head and another under their knees. Some people prefer to lie on their side with a pillow between their knees. Don't stay in one position for too long. Take short walks (10 to 20 minutes) every 2 to 3 hours. Avoid slopes, hills, and stairs until you feel better. Walk only distances you can manage without pain, especially leg pain. How to do the exercises Pelvic tilt 1. Lie on your back with your knees bent. 2. \"Brace\" your stomach-tighten your muscles by pulling in and imagining your belly button moving toward your spine. 3. Press your lower back into the floor. You should feel your hips and pelvis rock back. 4. Hold for 6 seconds while breathing smoothly. 5. Relax and allow your pelvis and hips to rock forward. 6. Repeat 8 to 12 times. Back stretches 1. Get down on your hands and knees on the floor. 2. Relax your head and allow it to droop. Round your back up toward the ceiling until you feel a nice stretch in your upper, middle, and lower back. Hold this stretch for as long as it feels comfortable, or about 15 to 30 seconds. 3. Return to the starting position with a flat back while you are on your hands and knees. 4. Let your back sway by pressing your stomach toward the floor. Lift your buttocks toward the ceiling. 5. Hold this position for 15 to 30 seconds. 6. Repeat 2 to 4 times. Follow-up care is a key part of your treatment and safety. Be sure to make and go to all appointments, and call your doctor if you are having problems. It's also a good idea to know your test results and keep a list of the medicines you take. Where can you learn more? Go to http://suzan-kinga.info/. Enter B512 in the search box to learn more about \"Low Back Arthritis: Exercises. \" Current as of: March 21, 2017 Content Version: 11.4 © 0585-0594 [employer]. Care instructions adapted under license by [employer] (which disclaims liability or warranty for this information).  If you have questions about a medical condition or this instruction, always ask your healthcare professional. [employer] disclaims any warranty or liability for your use of this information. Herniated Disc: Exercises Your Care Instructions Here are some examples of typical rehabilitation exercises for your condition. Start each exercise slowly. Ease off the exercise if you start to have pain. Your doctor or physical therapist will tell you when you can start these exercises and which ones will work best for you. Back Exercises These exercises can help you move easier and feel better. But when you first start doing them, you may have more pain in your back. This is normal. But it is important to pay close attention to your pain during and after each exercise. · Keep doing these exercises if your pain stays the same or moves from your leg and buttock more toward the middle of your spine. Pain moving out of your leg and buttock is a good sign. · Stop doing these exercises if your pain gets worse in your leg and buttock. Stop if you start to have pain in your leg and buttock that you didn't have before. Be sure to do these exercises in the order they appear. Note how your pain changes before you move to the next one. If your pain is much worse right after exercise and stays worse the next day, do not do any of these exercises. How to do the exercises 1. Rest on belly 7. Lie on your stomach, face down, with your head turned to the side. Place your arms beside your body. If this bothers your neck, place your hands, one on top of the other, underneath your forehead. This will help support your head and neck. 8. Try to relax your lower back muscles as much as you can. 9. Continue to lie on your stomach for 2 minutes. 10. If your pain spreads down your leg or increases down your leg, stop this exercise and do not do the next exercises. 2. Press-up 1. Lie on your stomach, face down. Keep your elbows tucked into your sides and under your shoulders. 2. Press your elbows down into the floor to raise your upper back. As you do this, relax your stomach muscles. Allow your back to arch without using your back muscles. Let your low back relax completely as you arch up. 3. Hold this position for 2 minutes. 4. Repeat 2 to 4 times. 5. If your pain spreads down your leg or increases down your leg, stop this exercise and do not do the next exercises. 3. Full press-up 1. Lie on your stomach, face down. Keep your elbows tucked into your sides and under your shoulders. 2. Straighten your elbows, and push your upper body up as far as you can. Allow your lower back to sag. Keep your hips, pelvis, and legs relaxed. 3. Hold this position for 5 seconds, and then relax. 4. Repeat 10 times. Each time, try to raise your upper body a little higher and hold your arms a bit straighter. 5. If your pain spreads down your leg or gets worse down your leg, stop this exercise and do not move to the next exercise. 6. If you can't do this exercise, you may instead try the backward bend exercise that follows. 4. Backward bend 1. Stand with your feet hip-width apart. Your toes should point forward. Do not lock your knees. 2. Place your hands in the small of your back. 3. Bend backward as far as you can, keeping your knees straight. Hold this position for 2 to 3 seconds. Then return to your starting position. 4. Repeat 2 to 4 times. Each time, try to bend backward a little farther, until you bend backward as far as you can. 5. If your pain spreads down your leg or increases down your leg, stop this exercise. Follow-up care is a key part of your treatment and safety. Be sure to make and go to all appointments, and call your doctor if you are having problems. It's also a good idea to know your test results and keep a list of the medicines you take. Where can you learn more? Go to http://suzan-kinga.info/. Enter [phone] [phone] in the search box to learn more about \"Herniated Disc: Exercises. \" Current as of: March 21, 2017 Content Version: 11.4 © 5349-8017 [employer]. Care instructions adapted under license by [employer] (which disclaims liability or warranty for this information). If you have questions about a medical condition or this instruction, always ask your healthcare professional. [place] [address] any warranty or liability for your use of this information. Introducing [facility]! [employer] introduces CURRENT patient portal. Now you can access parts of your medical record, email your doctor's office, and request medication refills online. 1. In your internet browser, go to [email or web address] 2. Click on the First Time User? Click Here link in the Sign In box. You will see the New Member Sign Up page. 3. Enter your CURRENT Access Code exactly as it appears below. You will not need to use this code after youve completed the sign-up process. If you do not sign up before the expiration date, you must request a new code. · CURRENT Access Code: [email or web address] Expires: 6/28/2018  2:41 PM 
 
4. Enter the last four digits of your Social Security Number (xxxx) and Date of Birth (mm/dd/yyyy) as indicated and click Submit. You will be taken to the next sign-up page. 5. Create a CURRENT ID. This will be your CURRENT login ID and cannot be changed, so think of one that is secure and easy to remember. 6. Create a CURRENT password. You can change your password at any time. 7. Enter your Password Reset Question and Answer. This can be used at a later time if you forget your password. 8. Enter your e-mail address. You will receive e-mail notification when new information is available in [address] 9. Click Sign Up. You can now view and download portions of your medical record. 10. Click the Download Summary menu link to download a portable copy of your medical information. If you have questions, please visit the Frequently Asked Questions section of the [employer] website. Remember, [employer] is NOT to be used for urgent needs. For medical emergencies, dial 911. Now available from your iPhone and Android! Please provide this summary of care documentation to your next provider. Your primary care clinician is listed as Fidel Bell. If you have any questions after today's visit, please call [phone].

## 2018-05-16 ENCOUNTER — TELEPHONE (OUTPATIENT)
Dept: FAMILY MEDICINE CLINIC | Age: 38
End: 2018-05-16

## 2018-05-16 PROBLEM — M15.9 PRIMARY OSTEOARTHRITIS INVOLVING MULTIPLE JOINTS: Status: ACTIVE | Noted: 2018-05-16

## 2018-05-17 ENCOUNTER — TELEPHONE (OUTPATIENT)
Dept: FAMILY MEDICINE CLINIC | Age: 38
End: 2018-05-17

## 2018-05-17 NOTE — TELEPHONE ENCOUNTER
Spoke with patient. [facility] paperwork is completed and faxed. Patient will be in to pick it up.

## 2018-05-21 ENCOUNTER — DOCUMENTATION ONLY (OUTPATIENT)
Dept: ORTHOPEDIC SURGERY | Age: 38
End: 2018-05-21

## 2018-05-21 ENCOUNTER — OFFICE VISIT (OUTPATIENT)
Dept: FAMILY MEDICINE CLINIC | Age: 38
End: 2018-05-21

## 2018-05-21 DIAGNOSIS — M15.9 PRIMARY OSTEOARTHRITIS INVOLVING MULTIPLE JOINTS: Primary | ICD-10-CM

## 2018-05-21 NOTE — PROGRESS NOTES
HISTORY OF PRESENT ILLNESS  Wilman Conteh is a 40 y.o. female. Patient presents today regarding her FMLA paperwork. HPI  Pt states I did not fill out the paperwork correctly. 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 She also stated that I told her that the medication I put her on would cause fatigue and that she should not drive while on it. ROS    Physical Exam    ASSESSMENT and PLAN    ICD-10-CM ICD-9-CM    1. Primary osteoarthritis involving multiple joints M15.0 715.09        The FMLA and the other paperwork she gave me also had her requesting no work until June 1, 2018  I at no time advised Pt that she could have any time off from work. I filled her paperwork out with no restrictions, I advised Pt she has mild osteoarthritis and that does not need time off, restrictions or disability. The medication I gave her was Tramadol which does not cause fatigue. I asked patient how she got her today and she stated she drove. I spoke with Dr Malcolm Pulling office: they are not aware of any request at this time for FMLA paper work to be done. The office completed FMLA paperwork last year and that was with no restrictions.

## 2018-05-21 NOTE — PROGRESS NOTES
fmla form was received from [facility], instructed patient that we will call when completed, may take 7-10 business days.

## 2018-05-23 ENCOUNTER — HOSPITAL ENCOUNTER (OUTPATIENT)
Dept: PHYSICAL THERAPY | Age: 38
Discharge: HOME | End: 2018-05-23
Payer: COMMERCIAL

## 2018-05-23 PROCEDURE — 97161 PT EVAL LOW COMPLEX 20 MIN: CPT

## 2018-05-23 PROCEDURE — 97110 THERAPEUTIC EXERCISES: CPT

## 2018-05-23 PROCEDURE — 97140 MANUAL THERAPY 1/> REGIONS: CPT

## 2018-05-23 NOTE — PROGRESS NOTES
PT DAILY TREATMENT NOTE/LUMBAR EVAL 3-16    Patient Name: Leonor Avitia  Date:2018  : 1980  [x]  Patient  Verified  Payor: BLUE CROSS / Plan: Taylor Felix [id number] PPO / Product Type: PPO /    In time:5:28  Out time:6:25  Total Treatment Time (min): 62  Visit #: 1 of 10    Treatment Area: Pain in left knee [M25.562]  Pain in left hip [M25.552]  Low back pain [M54.5]  SUBJECTIVE  Pain Level (0-10 scale): 6  [x]constant []intermittent []improving []worsening []no change since onset    Any medication changes, allergies to medications, adverse drug reactions, diagnosis change, or new procedure performed?: [x] No    [] Yes (see summary sheet for update)  Subjective functional status/changes:     Subjective: pt c/o back pain with radicular s/s into the left LE. Not able to perform usual job duties. 4th and 5th toes on the left foot are numb and not moving well. Back pain for a year that progressively got worse resulting in her needing to go to the hospital.  Difficulty walking up stairs at home. 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  Work, coupon    Limitations to Activity/PLOF: Limited walking,  Not able to perform usual work duties     Current Health Status:  Fair    Barriers: [x]pain []financial []time []transportation []other    Motivation: High    Substance use: []Alcohol []Tobacco []other:     FABQ Score: []low []elevate    Cognition: A & O x 3    Other:    Risk For Falls:   []No  []low []elevate     Balance:  Good        OBJECTIVE/EXAMINATION  - Gait Left antalgic gait, decr pelvis (B), Decr trunk rot, decr left hip flx  - Elev Left crest  - Decr mobility Right innominate in stork stance  - Elev Right sacral base  - Fingertip - Floor 44cm with pain Right glute  - Lx Ext 22*  - Trunk Rot Right 40% Left 60%  - Pain reported at left buttocks with sitting  - (+) Left Slump test  - (+) (B) Piriformis test  - TTP Right Sacral base/Sup Glute  - HS 90/90 Right 145 Left 118  - SLR Test Right 20 Left 10 Left Leg pain with both    Strength   L(0-5) R (0-5) N/T   Hip Flexion (L1,2) 3+ 5- []   Knee Extension (L3,4) 5 5 []   Ankle Dorsiflexion (L4) 5 5 []   Great Toe Extension (L5)   []   Ankle Plantarflexion (S1) 3+ 5 []   Knee Flexion (S1,2) 4 5 []   Upper Abdominals   []   Lower Abdominals   []   Paraspinals   []   Back Rotators   []   Gluteus Alen 3+ 5 []   Other   []             30 min [x]Eval                  []Re-Eval       12 min Therapeutic Exercise:  [x] See flow sheet :   Rationale: increase ROM, increase strength and improve coordination to improve the patients ability to tolerate increased activity levels    15 min Manual Therapy:  Inf glide right sacral base, (B) Innominate p/a mobs, (B) Lx Rot mobs, (B) LE LAD   Rationale: decrease pain, increase ROM and increase tissue extensibility to tolerate increased activity levels      With   [x] TE   [] TA   [] neuro   [] other: Patient Education: [x] Review HEP    [] Progressed/Changed HEP based on:   [] positioning   [] body mechanics   [] transfers   [] heat/ice application    [] other:      Other Objective/Functional Measures:   - Improved mobility and ambulation after treatment  - Good response to Manual therapy  - Pt demo good understanding of HEP    Other tests/comments:       Pain Level (0-10 scale) post treatment: 2    ASSESSMENT/Changes in Function:      Patient will continue to benefit from skilled PT services to modify and progress therapeutic interventions, address functional mobility deficits, address ROM deficits, address strength deficits, analyze and address soft tissue restrictions, analyze and cue movement patterns and analyze and modify body mechanics/ergonomics to attain remaining goals. []  See Plan of Care  []  See progress note/recertification  []  See Discharge Summary         Progress towards goals / Updated goals:  1. Pt will be compliant and independent with HEP in order to facilitate PT sessions and aid with self management   Eval Status:  Initiated   Current Status:  2. Pt to tolerate 30 min or more of TE and/or Interventions w/o increased s/s   Eval Status:  Initiated   Current Status:    1. Pt will report 50% improvement or better with involvement to show a significant increase in function   Eval Status:  Initiated   Current Status:  2. Pt will ambulate 500' w/o left antalgic gait for progress to usual community ambulation w/o difficulty    Eval Status:  Initiated   Current Status:  3. Pt will demonstrate the ability to perform (B) SLR to 70* or better to show increased strength, function and decreased neural tension Left LE      Eval Status:  Initiated   Current Status:  4. Pt will demonstrate the ability to walk 2 flights of steps with a reciprocal gait patter with little discomfort to be able to walk up her steps at the end of the day w/o discomfort to prepare for bed      Eval Status:  Initiated   Current Status:  5.   Pt will improve knee FOTO score to 53 in 14 visits  to show significant improvement in function for progress to independent community ambulation and return to work   Eval Status: 30   Current Status:      PLAN  [x]  Upgrade activities as tolerated     [x]  Continue plan of care  []  Update interventions per flow sheet       []  Discharge due to:_  []  Other:_      Lucia Carlton, PT 5/23/2018  5:31 PM

## 2018-05-23 NOTE — PROGRESS NOTES
[facility]  [address] [facility], [address] [place] [address], [facility] [address]  [phone] fax      Plan of Care/ Statement of Necessity for Physical Therapy Services    Patient name: Renetta Roman Start of Care: 2018   Referral source: Aryan Smith MD : 1980    Medical Diagnosis: Pain in left knee [M25.562]  Pain in left hip [M25.552]  Low back pain [M54.5]   Onset Date:18    Treatment Diagnosis: Pelvic obliquity, Neural tension Left LE In the sciatic nerve pattern    Prior Hospitalization: see medical history Provider#: [id number]   Medications: Verified on Patient summary List    Comorbidities: OA   Prior Level of Function: Back pain for 1 year but able to perform usual work duties and able to walk ups stairs normally      The Plan of Care and following information is based on the information from the initial evaluation. Assessment/ key information: Patient is a 40 y.o. female referred to PT with the above Dx. Patient seen today for pt c/o back pain with radicular s/s into the left LE. Not able to perform usual job duties. 4th and 5th toes on the left foot are numb and not moving well. Back pain for a year that progressively got worse resulting in her needing to go to the hospital.  Difficulty walking up stairs at home. Patient presents to PT with an impaired gait, decreased balance, decreased strength, decreased flexibility, and decreased mobility. She has a pelvic obliquity, (+) (B) Piriformis test, (+) Left slump test, (+) Neural tension left LE in the sciatic nerve pattern, and painful palpation of the Right sacral base/Superior Glute. Patient s/s appear to be consistent w/ diagnosis. Patient demonstrates the potential to make functional gains within a reasonable time frame. Patient will benefit from skilled PT to address impairments and improve functional mobility, strength, gait and balance for an improved quality of life.   Fall Risk Assessment: Patient demonstrates a no Fall Risk   Evaluation Complexity History MEDIUM  Complexity : 1-2 comorbidities / personal factors will impact the outcome/ POC ; Examination MEDIUM Complexity : 3 Standardized tests and measures addressing body structure, function, activity limitation and / or participation in recreation  ;Presentation LOW Complexity : Stable, uncomplicated  ;Clinical Decision Making MEDIUM Complexity : FOTO score of 26-74  Overall Complexity Rating: LOW   Problem List: pain affecting function, decrease ROM, decrease strength, impaired gait/ balance, decrease ADL/ functional abilitiies, decrease activity tolerance, decrease flexibility/ joint mobility, decrease transfer abilities and other FOTO Knee = 40   Treatment Plan may include any combination of the following: Therapeutic exercise, Therapeutic activities, Neuromuscular re-education, Physical agent/modality, Gait/balance training, Manual therapy, Patient education, Self Care training and Functional mobility training  Patient / Family readiness to learn indicated by: asking questions, trying to perform skills and interest  Persons(s) to be included in education: patient (P)  Barriers to Learning/Limitations: None  Patient Goal (s):  Is to get better so I can go back to work  Patient Self Reported Health Status: fair  Rehabilitation Potential: good    Short Term Goals: To be accomplished in 5 treatments:  1. Pt will be compliant and independent with HEP in order to facilitate PT sessions and aid with self management                        Eval Status:  Initiated                        Current Status:  2. Pt to tolerate 30 min or more of TE and/or Interventions w/o increased s/s                        Eval Status:  Initiated                        Current Status:   Long Term Goals: To be accomplished in 10 treatments:  1.   Pt will report 50% improvement or better with involvement to show a significant increase in function Eval Status:  Initiated                        Current Status:  2. Pt will ambulate 500' w/o left antalgic gait for progress to usual community ambulation w/o difficulty                         Eval Status:  Initiated                        Current Status:  3. Pt will demonstrate the ability to perform (B) SLR to 70* or better to show increased strength, function and decreased neural tension Left LE                           Eval Status:  Initiated                        Current Status:  4. Pt will demonstrate the ability to walk 2 flights of steps with a reciprocal gait patter with little discomfort to be able to walk up her steps at the end of the day w/o discomfort to prepare for bed                           Eval Status:  Initiated                        Current Status:  5. Pt will improve knee FOTO score to 48 in 14 visits  to show significant improvement in function for progress to independent community ambulation and return to work                        Eval Status: 30                        Current Status:     Frequency / Duration: Patient to be seen 2-3 times per week for 10 treatments. Patient/ Caregiver education and instruction: Diagnosis, prognosis, self care, activity modification and exercises   Comments:  Patient provided w/HEP   [x]  Plan of care has been reviewed with PTA    Zack Dwyer, PT 5/23/2018 5:31 PM  ________________________________________________________________________    I certify that the above Therapy Services are being furnished while the patient is under my care. I agree with the treatment plan and certify that this therapy is necessary.     [de-identified] Signature:____________________  Date:____________Time: _________    Please sign and return to [facility] [address] [facility]  [address] [place] [address], [address] [place] [address], [address] [place], [place] [address]  [phone] fax

## 2018-05-24 NOTE — PROGRESS NOTES
FMLA and STD completed and needs Dr Kartik Gutierrez. She will be in the office on June 1.   Will give to [facility] to get the signature

## 2018-05-25 ENCOUNTER — HOSPITAL ENCOUNTER (OUTPATIENT)
Dept: PHYSICAL THERAPY | Age: 38
Discharge: HOME | End: 2018-05-25
Payer: COMMERCIAL

## 2018-05-25 PROCEDURE — 97110 THERAPEUTIC EXERCISES: CPT

## 2018-05-25 PROCEDURE — 97035 APP MDLTY 1+ULTRASOUND EA 15: CPT

## 2018-05-25 NOTE — PROGRESS NOTES
PT DAILY TREATMENT NOTE     Patient Name: Everett Willett  Date:2018  : 1980  [x]  Patient  Verified  Payor: BLUE CROSS / Plan: [facility] PPO / Product Type: PPO /    In time:1036  Out time:1135  Total Treatment Time (min): 47  Visit #: 2 of 10    Treatment Area: Pain in left knee [M25.562]  Pain in left hip [M25.552]  Low back pain [M54.5]    SUBJECTIVE  Pain Level (0-10 scale): 5 back and behind knee. Any medication changes, allergies to medications, adverse drug reactions, diagnosis change, or new procedure performed?: [x] No    [] Yes (see summary sheet for update)  Subjective functional status/changes:   [] No changes reported  Doing alright. Started some of the exercises at home.     OBJECTIVE    Modality rationale: decrease pain to improve the patients ability to aid with increase tolerance to ADLs and activities   Min Type Additional Details    [] Estim:  []Unatt       []IFC  []Premod                        []Other:  []w/ice   []w/heat  Position:  Location:    [] Estim: []Att    []TENS instruct  []NMES                    []Other:  []w/US   []w/ice   []w/heat  Position:  Location:    []  Traction: [] Cervical       []Lumbar                       [] Prone          []Supine                       []Intermittent   []Continuous Lbs:  [] before manual  [] after manual   8 [x]  Ultrasound: [x]Continuous   [] Pulsed                           [x]1MHz   []3MHz W/cm2:1.3  Location:B LS    []  Iontophoresis with dexamethasone         Location: [] Take home patch   [] In clinic   10 [x]  Ice   post  []  heat  []  Ice massage  []  Laser   []  Anodyne Position:prone  Location:B LS    []  Laser with stim  []  Other:  Position:  Location:    []  Vasopneumatic Device Pressure:       [] lo [] med [] hi   Temperature: [] lo [] med [] hi   [] Skin assessment post-treatment:  []intact []redness- no adverse reaction    []redness  adverse reaction:      min []Eval []Re-Eval       36 min Therapeutic Exercise:  [x] See flow sheet :   Rationale: increase ROM, increase strength and improve coordination to improve the patients ability to aid with increase tolerance to ADLs and activities     min Therapeutic Activity:  []  See flow sheet :   Rationale:   to improve the patients ability to       min Neuromuscular Re-education:  []  See flow sheet :   Rationale:   to improve the patients ability to      min Manual Therapy:     Rationale: to      min Gait Training:  ___ feet with ___ device on level surfaces with ___ level of assist   Rationale: With   [] TE   [] TA   [] neuro   [] other: Patient Education: [x] Review HEP    [] Progressed/Changed HEP based on:   [] positioning   [] body mechanics   [] transfers   [] heat/ice application    [] other:      Other Objective/Functional Measures: VC exercises and tech    Pain Level (0-10 scale) post treatment: 4    ASSESSMENT/Changes in Function: tolerated well. First full day back . Patient will continue to benefit from skilled PT services to modify and progress therapeutic interventions, address functional mobility deficits, address ROM deficits, address strength deficits, analyze and address soft tissue restrictions, analyze and cue movement patterns, analyze and modify body mechanics/ergonomics, assess and modify postural abnormalities and instruct in home and community integration to attain remaining goals. [x]  See Plan of Care  []  See progress note/recertification  []  See Discharge Summary         Progress towards goals / Updated goals:     Short Term Goals:  To be accomplished in 5 treatments:  1.  Pt will be compliant and independent with HEP in order to facilitate PT sessions and aid with self management                        Eval Status:  Initiated                        Current Status: progressing 5/25/18  2.  Pt to tolerate 30 min or more of TE and/or Interventions w/o increased s/s                        Eval Status:  Initiated                        Current Status:                        Long Term Goals:  To be accomplished in 10 treatments:  1.  Pt will report 50% improvement or better with involvement to show a significant increase in function                        Eval Status:  Initiated                        MHHMDRP Status: 36  5/25/18  2.  Pt will ambulate 500' w/o left antalgic gait for progress to usual community ambulation w/o difficulty                         Eval Status:  Initiated                        CMFDXGH Status:  3.  Pt will demonstrate the ability to perform (B) SLR to 70* or better to show increased strength, function and decreased neural tension Left LE                           Eval Status:  Initiated                        Current Status:  4.  Pt will demonstrate the ability to walk 2 flights of steps with a reciprocal gait patter with little discomfort to be able to walk up her steps at the end of the day w/o discomfort to prepare for bed                           Eval Status:  Initiated                        JDJSHKU Status:  5.  Pt will improve knee FOTO score to 53 in 14 visits  to show significant improvement in function for progress to independent community ambulation and return to work                        Eval Status: 30                        Current Status:     PLAN  [x]  Upgrade activities as tolerated     [x]  Continue plan of care  []  Update interventions per flow sheet       []  Discharge due to:_  []  Other:_      Dora Abraham, PT 5/25/2018  10:49 AM    Future Appointments  Date Time Provider Marcello Devine   6/19/2018 7:45 AM Jackson Lafleur  [address]

## 2018-05-29 ENCOUNTER — DOCUMENTATION ONLY (OUTPATIENT)
Dept: ORTHOPEDIC SURGERY | Age: 38
End: 2018-05-29

## 2018-05-29 NOTE — PROGRESS NOTES
Disability form was received from pt , instructed patient that we will call when completed, may take 7-10 business days.

## 2018-06-01 ENCOUNTER — HOSPITAL ENCOUNTER (OUTPATIENT)
Dept: PHYSICAL THERAPY | Age: 38
Discharge: HOME | End: 2018-06-01
Payer: SELF-PAY

## 2018-06-01 PROCEDURE — 97035 APP MDLTY 1+ULTRASOUND EA 15: CPT

## 2018-06-01 PROCEDURE — 97110 THERAPEUTIC EXERCISES: CPT

## 2018-06-04 ENCOUNTER — APPOINTMENT (OUTPATIENT)
Dept: PHYSICAL THERAPY | Age: 38
End: 2018-06-04
Payer: SELF-PAY

## 2018-06-06 ENCOUNTER — APPOINTMENT (OUTPATIENT)
Dept: PHYSICAL THERAPY | Age: 38
End: 2018-06-06
Payer: SELF-PAY

## 2018-06-06 ENCOUNTER — OFFICE VISIT (OUTPATIENT)
Dept: FAMILY MEDICINE CLINIC | Age: 38
End: 2018-06-06

## 2018-06-06 ENCOUNTER — HOSPITAL ENCOUNTER (OUTPATIENT)
Dept: PHYSICAL THERAPY | Age: 38
Discharge: HOME | End: 2018-06-06
Payer: SELF-PAY

## 2018-06-06 VITALS
TEMPERATURE: 98.4 F | BODY MASS INDEX: 44.04 KG/M2 | HEIGHT: 67 IN | WEIGHT: 280.6 LBS | DIASTOLIC BLOOD PRESSURE: 78 MMHG | RESPIRATION RATE: 18 BRPM | HEART RATE: 79 BPM | SYSTOLIC BLOOD PRESSURE: 128 MMHG | OXYGEN SATURATION: 97 %

## 2018-06-06 DIAGNOSIS — M54.42 CHRONIC BILATERAL LOW BACK PAIN WITH LEFT-SIDED SCIATICA: Primary | ICD-10-CM

## 2018-06-06 DIAGNOSIS — G89.29 CHRONIC BILATERAL LOW BACK PAIN WITH LEFT-SIDED SCIATICA: Primary | ICD-10-CM

## 2018-06-06 PROCEDURE — 97110 THERAPEUTIC EXERCISES: CPT

## 2018-06-06 PROCEDURE — 97035 APP MDLTY 1+ULTRASOUND EA 15: CPT

## 2018-06-06 RX ORDER — METHYLPREDNISOLONE 4 MG/1
TABLET ORAL
Qty: 1 DOSE PACK | Refills: 0 | Status: SHIPPED | OUTPATIENT
Start: 2018-06-06 | End: 2019-04-25

## 2018-06-06 RX ORDER — METHOCARBAMOL 500 MG/1
500 TABLET, FILM COATED ORAL 4 TIMES DAILY
Qty: 60 TAB | Refills: 0 | Status: SHIPPED | OUTPATIENT
Start: 2018-06-06 | End: 2018-06-14 | Stop reason: SDUPTHER

## 2018-06-06 NOTE — LETTER
NOTIFICATION RETURN TO WORK / SCHOOL 
 
6/6/2018 9:05 AM 
 
Ms. Nayely Cowan [facility] [address] To Whom It May Concern: 
 
Nayely Cowan is currently under the care of [address] . She will return to work/school on: 6/9/18 If there are questions or concerns please have the patient contact our office.  
 
 
 
Sincerely, 
 
 
Allie Serna NP

## 2018-06-06 NOTE — MR AVS SNAPSHOT
[address] [facility] 
 
 
 [address] [facility] [address] 
[phone] Patient: Mango Cheema MRN: [id number] :1980 Visit Information Date & Time Provider Department Dept. Phone Encounter #  
 2018  8:30 AM Robert Posadas NP [facility] [phone] [id number] Follow-up Instructions Return if symptoms worsen or fail to improve, for schedule sooner follow up with Dr. Molina [facility] office. Your Appointments 7/10/2018  2:45 PM  
Follow Up with Ragena Mcardle, MD  
[facility] ONE [address] [place] [address]) Appt Note: 1 MO PT & Med F/U; PT CALLED AND RS 18 FOR THIS NEW DATE. [address] [facility] [address]  
[phone]  
  
   
 [address] [facility] [address] Upcoming Health Maintenance Date Due DTaP/Tdap/Td series (1 - Tdap) 10/10/2001 PAP AKA CERVICAL CYTOLOGY 2018 Influenza Age 5 to Adult 10/11/2018* *Topic was postponed. The date shown is not the original due date. Allergies as of 2018  Review Complete On: 2018 By: Robert Posadas NP No Known Allergies Current Immunizations  Never Reviewed No immunizations on file. Not reviewed this visit You Were Diagnosed With   
  
 Codes Comments Chronic bilateral low back pain with left-sided sciatica    -  Primary ICD-10-CM: M54.42, G89.29 ICD-9-CM: 724.2, 724.3, 338.29 Vitals BP Pulse Temp Resp Height(growth percentile) Weight(growth percentile) 128/78 (BP 1 Location: Left arm, BP Patient Position: Sitting) 79 98.4 °F (36.9 °C) (Oral) 18 5' 7\" (1.702 m) 280 lb 9.6 oz (127.3 kg) LMP SpO2 BMI OB Status Smoking Status 2018 97% 43.95 kg/m2 Having regular periods Never Smoker Vitals History BMI and BSA Data Body Mass Index Body Surface Area 43.95 kg/m 2 2.45 m 2 Preferred Pharmacy Pharmacy Name Phone [facility] [address] [place] [address]. [phone] Your Updated Medication List  
  
   
This list is accurate as of 6/6/18  9:06 AM.  Always use your most recent med list.  
  
  
  
  
 gabapentin 300 mg capsule Commonly known as:  NEURONTIN  
1 in the am and 2 in the evening as directed  Indications: NEUROPATHIC PAIN  
  
 ibuprofen 600 mg tablet Commonly known as:  MOTRIN Take 1 Tab by mouth every six (6) hours as needed for Pain. methocarbamol 500 mg tablet Commonly known as:  ROBAXIN Take 1 Tab by mouth four (4) times daily. methylPREDNISolone 4 mg tablet Commonly known as:  [facility] Soda Take as directed  
  
 traMADol 50 mg tablet Commonly known as:  ULTRAM  
Take 1 Tab by mouth every six (6) hours as needed for Pain. Max Daily Amount: 200 mg.  
  
 TYLENOL 325 mg tablet Generic drug:  acetaminophen Take  by mouth every four (4) hours as needed for Pain. Prescriptions Sent to Pharmacy Refills  
 methylPREDNISolone (MEDROL DOSEPACK) 4 mg tablet 0 Sig: Take as directed Class: Normal  
 Pharmacy: [facility]  [address] Ph #: [phone]  
 methocarbamol (ROBAXIN) 500 mg tablet 0 Sig: Take 1 Tab by mouth four (4) times daily. Class: Normal  
 Pharmacy: [facility]  [address] Ph #: [phone] Route: Oral  
  
Follow-up Instructions Return if symptoms worsen or fail to improve, for schedule sooner follow up with Dr. Judith Fishman office. To-Do List   
 06/06/2018   Imaging:  XR SPINE LUMB COMP W BEND   
  
 06/06/2018 2:30 PM  
  Appointment with Val Vora PT at [facility] ([phone])  
  
 06/08/2018 2:00 PM  
  Appointment with Val Vora PT at [facility] ([phone])  
  
 06/11/2018 4:00 PM  
  Appointment with Ryder Rucker PTA at [facility] ([phone])  
  
 06/13/2018 9:30 AM  
  Appointment with Trinidad Piña PT at [facility] ([phone])  
  
 06/15/2018 3:00 PM  
  Appointment with Trinidad Piña PT at [facility] ([phone])  
  
 06/21/2018 3:30 PM  
  Appointment with Cynthia Emerson PTA at [facility] ([phone]) Patient Instructions Hip Pain: Care Instructions Your Care Instructions Hip pain may be caused by many things, including overuse, a fall, or a twisting movement. Another cause of hip pain is arthritis. Your pain may increase when you stand up, walk, or squat. The pain may come and go or may be constant. Home treatment can help relieve hip pain, swelling, and stiffness. If your pain is ongoing, you may need more tests and treatment. Follow-up care is a key part of your treatment and safety. Be sure to make and go to all appointments, and call your doctor if you are having problems. It's also a good idea to know your test results and keep a list of the medicines you take. How can you care for yourself at home? · Take pain medicines exactly as directed. ¨ If the doctor gave you a prescription medicine for pain, take it as prescribed. ¨ If you are not taking a prescription pain medicine, ask your doctor if you can take an over-the-counter medicine. · Rest and protect your hip. Take a break from any activity, including standing or walking, that may cause pain. · Put ice or a cold pack against your hip for 10 to 20 minutes at a time. Try to do this every 1 to 2 hours for the next 3 days (when you are awake) or until the swelling goes down. Put a thin cloth between the ice and your skin. · Sleep on your healthy side with a pillow between your knees, or sleep on your back with pillows under your knees. · If there is no swelling, you can put moist heat, a heating pad, or a warm cloth on your hip. Do gentle stretching exercises to help keep your hip flexible. · Learn how to prevent falls. Have your vision and hearing checked regularly. Wear slippers or shoes with a nonskid sole. · Stay at a healthy weight. · Wear comfortable shoes. When should you call for help? Call 911 anytime you think you may need emergency care. For example, call if: 
? · You have sudden chest pain and shortness of breath, or you cough up blood. ? · You are not able to stand or walk or bear weight. ? · Your buttocks, legs, or feet feel numb or tingly. ? · Your leg or foot is cool or pale or changes color. ? · You have severe pain. ?Call your doctor now or seek immediate medical care if: 
? · You have signs of infection, such as: 
¨ Increased pain, swelling, warmth, or redness in the hip area. ¨ Red streaks leading from the hip area. ¨ Pus draining from the hip area. ¨ A fever. ? · You have signs of a blood clot, such as: 
¨ Pain in your calf, back of the knee, thigh, or groin. ¨ Redness and swelling in your leg or groin. ? · You are not able to bend, straighten, or move your leg normally. ? · You have trouble urinating or having bowel movements. ? Watch closely for changes in your health, and be sure to contact your doctor if: 
? · You do not get better as expected. Where can you learn more? Go to http://suzan-kinga.info/. Enter W513 in the search box to learn more about \"Hip Pain: Care Instructions. \" Current as of: March 20, 2017 Content Version: 11.4 © 0096-7823 [employer]. Care instructions adapted under license by [employer] (which disclaims liability or warranty for this information). If you have questions about a medical condition or this instruction, always ask your healthcare professional. [place] [address] any warranty or liability for your use of this information. Introducing [facility] SERVICES!    
 [place] introduces [employer] patient portal. Now you can access parts of your medical record, email your doctor's office, and request medication refills online. 1. In your internet browser, go to [email or web address] 2. Click on the First Time User? Click Here link in the Sign In box. You will see the New Member Sign Up page. 3. Enter your "[employer] Access Code exactly as it appears below. You will not need to use this code after youve completed the sign-up process. If you do not sign up before the expiration date, you must request a new code. · "[employer] Access Code: [email or web address] Expires: 6/28/2018  2:41 PM 
 
4. Enter the last four digits of your Social Security Number (xxxx) and Date of Birth (mm/dd/yyyy) as indicated and click Submit. You will be taken to the next sign-up page. 5. Create a "[employer] ID. This will be your "[employer] login ID and cannot be changed, so think of one that is secure and easy to remember. 6. Create a "[employer] password. You can change your password at any time. 7. Enter your Password Reset Question and Answer. This can be used at a later time if you forget your password. 8. Enter your e-mail address. You will receive e-mail notification when new information is available in [address] 9. Click Sign Up. You can now view and download portions of your medical record. 10. Click the Download Summary menu link to download a portable copy of your medical information. If you have questions, please visit the Frequently Asked Questions section of the "[employer] website. Remember, "[employer] is NOT to be used for urgent needs. For medical emergencies, dial 911. Now available from your iPhone and Android! Please provide this summary of care documentation to your next provider. Your primary care clinician is listed as Leander Boss. If you have any questions after today's visit, please call [phone].

## 2018-06-06 NOTE — PROGRESS NOTES
Chief Complaint   Patient presents with    Back Pain     Patientt is here today for back pain F/U.      1. Have you been to the ER, urgent care clinic since your last visit? Hospitalized since your last visit? No    2. Have you seen or consulted any other health care providers outside of the [address] [place] [address] since your last visit? Include any pap smears or colon screening.  No

## 2018-06-06 NOTE — PATIENT INSTRUCTIONS
Hip Pain: Care Instructions  Your Care Instructions    Hip pain may be caused by many things, including overuse, a fall, or a twisting movement. Another cause of hip pain is arthritis. Your pain may increase when you stand up, walk, or squat. The pain may come and go or may be constant. Home treatment can help relieve hip pain, swelling, and stiffness. If your pain is ongoing, you may need more tests and treatment. Follow-up care is a key part of your treatment and safety. Be sure to make and go to all appointments, and call your doctor if you are having problems. It's also a good idea to know your test results and keep a list of the medicines you take. How can you care for yourself at home? · Take pain medicines exactly as directed. ¨ If the doctor gave you a prescription medicine for pain, take it as prescribed. ¨ If you are not taking a prescription pain medicine, ask your doctor if you can take an over-the-counter medicine. · Rest and protect your hip. Take a break from any activity, including standing or walking, that may cause pain. · Put ice or a cold pack against your hip for 10 to 20 minutes at a time. Try to do this every 1 to 2 hours for the next 3 days (when you are awake) or until the swelling goes down. Put a thin cloth between the ice and your skin. · Sleep on your healthy side with a pillow between your knees, or sleep on your back with pillows under your knees. · If there is no swelling, you can put moist heat, a heating pad, or a warm cloth on your hip. Do gentle stretching exercises to help keep your hip flexible. · Learn how to prevent falls. Have your vision and hearing checked regularly. Wear slippers or shoes with a nonskid sole. · Stay at a healthy weight. · Wear comfortable shoes. When should you call for help? Call 911 anytime you think you may need emergency care. For example, call if:  ? · You have sudden chest pain and shortness of breath, or you cough up blood.    ? · You are not able to stand or walk or bear weight. ? · Your buttocks, legs, or feet feel numb or tingly. ? · Your leg or foot is cool or pale or changes color. ? · You have severe pain. ?Call your doctor now or seek immediate medical care if:  ? · You have signs of infection, such as:  ¨ Increased pain, swelling, warmth, or redness in the hip area. ¨ Red streaks leading from the hip area. ¨ Pus draining from the hip area. ¨ A fever. ? · You have signs of a blood clot, such as:  ¨ Pain in your calf, back of the knee, thigh, or groin. ¨ Redness and swelling in your leg or groin. ? · You are not able to bend, straighten, or move your leg normally. ? · You have trouble urinating or having bowel movements. ? Watch closely for changes in your health, and be sure to contact your doctor if:  ? · You do not get better as expected. Where can you learn more? Go to http://suzan-kinga.info/. Enter U897 in the search box to learn more about \"Hip Pain: Care Instructions. \"  Current as of: March 20, 2017  Content Version: 11.4  © 5166-4126 [employer]. Care instructions adapted under license by [employer] (which disclaims liability or warranty for this information). If you have questions about a medical condition or this instruction, always ask your healthcare professional. [place] [address] any warranty or liability for your use of this information.

## 2018-06-06 NOTE — PROGRESS NOTES
HISTORY OF PRESENT ILLNESS    Shira Morrison is a 40y.o. year old female comes in today to be evaluated and treated for: back pain    Patient states she went to work on Monday and states she was able to work until lunch but the remaining of her shift she had to sit down. Comments standing, climbing ladders, walking distances causes increased pain. States her regular duties causes for her climb a ladder all day long. Comments she could possible work as  and also folding clothes. Reports she is currently going through physical therapy. Patient states she was released to return to work Monday, was to scheduled to return to work today. No Known Allergies  Current Outpatient Prescriptions   Medication Sig Dispense Refill    acetaminophen (TYLENOL) 325 mg tablet Take  by mouth every four (4) hours as needed for Pain.  ibuprofen (MOTRIN) 600 mg tablet Take 1 Tab by mouth every six (6) hours as needed for Pain. 20 Tab 0    gabapentin (NEURONTIN) 300 mg capsule 1 in the am and 2 in the evening as directed  Indications: NEUROPATHIC PAIN 90 Cap 1    traMADol (ULTRAM) 50 mg tablet Take 1 Tab by mouth every six (6) hours as needed for Pain. Max Daily Amount: 200 mg. 60 Tab 0     No past medical history on file.     ROS:  Review of Systems - General ROS: negative  Musculoskeletal ROS: left hip pain        Objective:  Visit Vitals    /78 (BP 1 Location: Left arm, BP Patient Position: Sitting)    Pulse 79    Temp 98.4 °F (36.9 °C) (Oral)    Resp 18    Ht 5' 7\" (1.702 m)    Wt 280 lb 9.6 oz (127.3 kg)    LMP 05/21/2018    SpO2 97%    BMI 43.95 kg/m2     General appearance - alert, well appearing, and in no distress  Neck - supple, no significant adenopathy  Chest - clear to auscultation, no wheezes, rales or rhonchi, symmetric air entry  Heart - normal rate, regular rhythm, normal S1, S2, no murmurs, rubs, clicks or gallops  Musculoskeletal: point tenderness along paraspinals bilaterally lumbar sacral region. +left straight leg test. DTRs patellafemoral +1 bilaterally no antalgic gait. Assessment/Plan:     ICD-10-CM ICD-9-CM    1. Chronic bilateral low back pain with left-sided sciatica M54.42 724.2 XR SPINE LUMB COMP W BEND    G89.29 724.3 methylPREDNISolone (MEDROL DOSEPACK) 4 mg tablet     338.29 methocarbamol (ROBAXIN) 500 mg tablet   work note provided. Informed any disability/FMLA forms need to be completed by Dr. Yuliana Bronson office as they are managing back/hip pain  Informed medrol dosepack and robaxin for comfort until able to get in sooner with Dr. Yuliana Bronson office  I have discussed the diagnosis with the patient and the intended plan as seen in the above orders. The patient has received an after-visit summary and questions were answered concerning future plans. I have discussed medication side effects and warnings with the patient as well. Patient agreeable with above plan and verbalizes understanding. Follow-up Disposition:  Return if symptoms worsen or fail to improve, for schedule sooner follow up with Dr. Yuliana Bronson office.

## 2018-06-07 ENCOUNTER — HOSPITAL ENCOUNTER (OUTPATIENT)
Dept: GENERAL RADIOLOGY | Age: 38
Discharge: HOME | End: 2018-06-07
Payer: SELF-PAY

## 2018-06-07 DIAGNOSIS — G89.29 CHRONIC BILATERAL LOW BACK PAIN WITH LEFT-SIDED SCIATICA: ICD-10-CM

## 2018-06-07 DIAGNOSIS — M54.42 CHRONIC BILATERAL LOW BACK PAIN WITH LEFT-SIDED SCIATICA: ICD-10-CM

## 2018-06-07 PROCEDURE — 72114 X-RAY EXAM L-S SPINE BENDING: CPT

## 2018-06-08 ENCOUNTER — APPOINTMENT (OUTPATIENT)
Dept: PHYSICAL THERAPY | Age: 38
End: 2018-06-08
Payer: SELF-PAY

## 2018-06-08 ENCOUNTER — TELEPHONE (OUTPATIENT)
Dept: FAMILY MEDICINE CLINIC | Age: 38
End: 2018-06-08

## 2018-06-08 ENCOUNTER — HOSPITAL ENCOUNTER (OUTPATIENT)
Dept: PHYSICAL THERAPY | Age: 38
Discharge: HOME | End: 2018-06-08
Payer: SELF-PAY

## 2018-06-08 PROCEDURE — 97035 APP MDLTY 1+ULTRASOUND EA 15: CPT

## 2018-06-08 PROCEDURE — 97110 THERAPEUTIC EXERCISES: CPT

## 2018-06-08 PROCEDURE — 97140 MANUAL THERAPY 1/> REGIONS: CPT

## 2018-06-08 NOTE — PROGRESS NOTES
PT DAILY TREATMENT NOTE     Patient Name: Maggy Jeffery  Date:2018  : 1980  [x]  Patient  Verified  Payor: BLUE CROSS / Plan: [id number] PPO / Product Type: PPO /    In time:4:00  Out time 5:10  Total Treatment Time (min): 70  Total 1:1= 58  Visit #: 5 of 10    Treatment Area: Pain in left knee [M25.562]  Pain in left hip [M25.552]  Low back pain [M54.5]    SUBJECTIVE  Pain Level (0-10 scale): 5/10  Any medication changes, allergies to medications, adverse drug reactions, diagnosis change, or new procedure performed?: [x] No    [] Yes (see summary sheet for update)  Subjective functional status/changes:   [] No changes reported  I have so much pain in my foot with numbness. OBJECTIVE    Modality rationale: decrease edema, decrease inflammation and decrease pain to improve the patients ability to return to full time duties at [employer].    Min Type Additional Details    [] Estim:  []Unatt       []IFC  []Premod                        []Other:  []w/ice   []w/heat  Position:  Location:    [] Estim: []Att    []TENS instruct  []NMES                    []Other:  []w/US   []w/ice   []w/heat  Position:  Location:    []  Traction: [] Cervical       []Lumbar                       [] Prone          []Supine                       []Intermittent   []Continuous Lbs:  [] before manual  [] after manual   8 [x]  Ultrasound: [x]Continuous   [] Pulsed                           []1MHz   []3MHz W/cm2:  1.5  Location: R side of spine    []  Iontophoresis with dexamethasone         Location: [] Take home patch   [] In clinic   10 [x]  Ice     []  heat  []  Ice massage  []  Laser   []  Anodyne Position:Prone  Location:LB and R thigh    []  Laser with stim  []  Other:  Position:  Location:    []  Vasopneumatic Device Pressure:       [] lo [] med [] hi   Temperature: [] lo [] med [] hi   [x] Skin assessment post-treatment:  [x]intact [x]redness- no adverse reaction    []redness  adverse reaction: 52, 1:1=32 min Therapeutic Exercise:  [x] See flow sheet :   Rationale: increase ROM, increase strength, improve coordination and improve balance to improve the patients ability to return to normal work load at [employer]. 8 min Manual Therapy:  STM as pt could not tolerate joint mobs or TPR. Rationale: decrease pain, increase ROM, increase tissue extensibility and decrease edema  to return to caring for her 4 children. With   [x] TE   [] TA   [] neuro   [] other: Patient Education: [x] Review HEP    [] Progressed/Changed HEP based on:   [] positioning   [] body mechanics   [] transfers   [] heat/ice application    [x] other: Pt with increased exacerbation of L LE pain, numbness and tingling and decreased WBing of LLE during ambulation. Other Objective/Functional Measures: Pt with decreased ability to stand or walk more than 5 minutes without what she describes as excruciating pain and discomfort down her LLE with numbness and tingling that takes her breath away at times. She reports trying to go back to full time duties this past week on Monday. Pt reported that she did her best to maintain her duties, however she reported that when she returned home, she could barely move and she required assistance from her 13and 15year old to assist with household duties and ADLs. FOTO-40, Pain is 4/10 after session with good good relief. Recommend pt use AD ie: cane until her inflammation decreases as she alters her weight off the  LLE  And this is causing moderate deviation and instability during ambulation. She reports that she will attempt to find one as she does not have the finances at this time to purchase one. Therapist gave pt information for the [employer] which pt graciously accepted. Pain Level (0-10 scale) post treatment:     ASSESSMENT/Changes in Function: Pt with poor disbursement of weight shifting of LLE.  She avoids weight and hops off the LLE quickly and this produces a moderate gait deviation that has continued to shorten her sciatic nerve. She has been educated on the need to ambulate with equal WBing each LE along with heel strike and decreased step width. Patient will continue to benefit from skilled PT services to address functional mobility deficits, address ROM deficits, address strength deficits, analyze and address soft tissue restrictions and analyze and modify body mechanics/ergonomics to attain remaining goals.      [x]  See Plan of Care  []  See progress note/recertification  []  See Discharge Summary         Progress towards goals / Updated goals:  Short Term Goals: To be accomplished in 5 treatments:  1.  Pt will be compliant and independent with HEP in order to facilitate PT sessions and aid with self management                        Eval Status:  Initiated                        Current Status: progressing 5/25/18  2.  Pt to tolerate 30 min or more of TE and/or Interventions w/o increased s/s                        Eval Status:  Initiated                        Current Status:                        Long Term Goals: To be accomplished in 10 treatments:  1.  Pt will report 50% improvement or better with involvement to show a significant increase in function                        Eval Status:  Initiated                        Current Status: 36  5/25/18  2.  Pt will ambulate 500' w/o left antalgic gait for progress to usual community ambulation w/o difficulty                         Eval Status:  Initiated                        Current Status:  3.  Pt will demonstrate the ability to perform (B) SLR to 70* or better to show increased strength, function and decreased neural tension Left LE                           Eval Status:  Initiated                        Current Status:  4.  Pt will demonstrate the ability to walk 2 flights of steps with a reciprocal gait patter with little discomfort to be able to walk up her steps at the end of the day w/o discomfort to prepare for bed                           Eval Status:  Initiated                        RQGDSGV Status:  5.  Pt will improve knee FOTO score to 53 in 14 visits  to show significant improvement in function for progress to independent community ambulation and return to work                        Eval Status: 30    PLAN  [x]  Upgrade activities as erated     [x]  Continue plan of care  []  Update interventions per flow sheet       []  Discharge due to:_  []  Other:_      Jade Syosset 6/8/2018  4:25 PM    Future Appointments  Date Time Provider Marcello Devine   6/11/2018 4:00 PM Judit Ordonez, PTA [facility]   6/13/2018 9:30 AM Emilia Fuentes, PT [facility]   6/15/2018 3:00 PM Emilia Fuentes, PT [facility]   6/21/2018 3:30 PM Nela Cargo, PTA [facility]   6/25/2018 4:30 PM Nela Cargo, PTA [facility]   6/26/2018 10:20 AM Ema Masterson  [address]   7/10/2018 2:45 PM Enoc Porter  [address]

## 2018-06-08 NOTE — TELEPHONE ENCOUNTER
Spoke with patient regarding x-ray results. Patient states she didn't get medication that was prescribed during her last visit, will pick that up today. Advised will send in last office note to Matthew her disability and leave service company with her employer.   No concerns or questions expressed at this time

## 2018-06-11 ENCOUNTER — APPOINTMENT (OUTPATIENT)
Dept: PHYSICAL THERAPY | Age: 38
End: 2018-06-11
Payer: SELF-PAY

## 2018-06-13 ENCOUNTER — APPOINTMENT (OUTPATIENT)
Dept: PHYSICAL THERAPY | Age: 38
End: 2018-06-13
Payer: SELF-PAY

## 2018-06-13 ENCOUNTER — TELEPHONE (OUTPATIENT)
Dept: ORTHOPEDIC SURGERY | Age: 38
End: 2018-06-13

## 2018-06-13 ENCOUNTER — TELEPHONE (OUTPATIENT)
Dept: FAMILY MEDICINE CLINIC | Age: 38
End: 2018-06-13

## 2018-06-13 NOTE — TELEPHONE ENCOUNTER
Patient called in to request a return call from NONI Luna or NONI Albright in regards to her leave of absence she does not feel that the paperwork completed is supporting her and what she needs to be able to keep her job while she is physically unable to do the work .

## 2018-06-13 NOTE — TELEPHONE ENCOUNTER
Patient called in requesting to speak with Dr. Melchor Hwang herself. She states that her forms that were filled out are not supporting her what is going on with patient and her HR dept called her.  She is requesting a call back as soon as Dr. Melchor Hwang returns to office at [phone]

## 2018-06-13 NOTE — TELEPHONE ENCOUNTER
The forms reflect that we did not take her out of work. Basically, the pt took herself out of work for a month starting 5/9/18 and tried to get her PCP to cover her, but they would not because they did not take her out of work either. She then brought the forms to us and it was filled out according to the chart that reflects we did not take her OOW.

## 2018-06-14 DIAGNOSIS — G89.29 CHRONIC BILATERAL LOW BACK PAIN WITH LEFT-SIDED SCIATICA: ICD-10-CM

## 2018-06-14 DIAGNOSIS — M54.42 CHRONIC BILATERAL LOW BACK PAIN WITH LEFT-SIDED SCIATICA: ICD-10-CM

## 2018-06-14 RX ORDER — METHOCARBAMOL 500 MG/1
500 TABLET, FILM COATED ORAL 4 TIMES DAILY
Qty: 60 TAB | Refills: 0 | Status: SHIPPED | OUTPATIENT
Start: 2018-06-14 | End: 2019-04-25

## 2018-06-14 NOTE — TELEPHONE ENCOUNTER
Requested Prescriptions     Pending Prescriptions Disp Refills    methocarbamol (ROBAXIN) 500 mg tablet 60 Tab 0     Sig: Take 1 Tab by mouth four (4) times daily.      Last Ov: 6/11/18  Next Ov: No F/U

## 2018-06-15 ENCOUNTER — HOSPITAL ENCOUNTER (OUTPATIENT)
Dept: PHYSICAL THERAPY | Age: 38
Discharge: HOME | End: 2018-06-15
Payer: SELF-PAY

## 2018-06-15 PROCEDURE — 97035 APP MDLTY 1+ULTRASOUND EA 15: CPT

## 2018-06-15 PROCEDURE — 97140 MANUAL THERAPY 1/> REGIONS: CPT

## 2018-06-15 NOTE — PROGRESS NOTES
PT DAILY TREATMENT NOTE - [facility]     Patient Name: Kiah Ley  Date:6/15/2018  : 1980  [x]  Patient  Verified  Payor: BLUE CROSS / Plan: [facility] PPO / Product Type: PPO /    In time:319  Out time:406  Total Treatment Time (min): 47  Total Timed Codes (min): 37  1:1 Treatment Time ( only): 30   Visit #: 6 of 10    Treatment Area: Pain in left knee [M25.562]  Pain in left hip [M25.552]  Low back pain [M54.5]    SUBJECTIVE  Pain Level (0-10 scale): 4  Any medication changes, allergies to medications, adverse drug reactions, diagnosis change, or new procedure performed?: [x] No    [] Yes (see summary sheet for update)  Subjective functional status/changes:   [] No changes reported  Still the back mostly, left side and right some are hurting    OBJECTIVE    Modality rationale: decrease inflammation, decrease pain and increase tissue extensibility to improve the patients ability to aid with increase tolerance to ADLS and activities   Min Type Additional Details    [] Estim:  []Unatt       []IFC  []Premod                        []Other:  []w/ice   []w/heat  Position:  Location:    [] Estim: []Att    []TENS instruct  []NMES                    []Other:  []w/US   []w/ice   []w/heat  Position:  Location:    []  Traction: [] Cervical       []Lumbar                       [] Prone          []Supine                       []Intermittent   []Continuous Lbs:  [] before manual  [] after manual   8 [x]  Ultrasound: [x]Continuous   [] Pulsed                           [x]1MHz   []3MHz W/cm2:1.3  Location:left > right LS    []  Iontophoresis with dexamethasone         Location: [] Take home patch   [] In clinic   10 [x]  Ice  post   []  heat  []  Ice massage  []  Laser   []  Anodyne Position:prone  Location:B LS    []  Laser with stim  []  Other:  Position:  Location:    []  Vasopneumatic Device Pressure:       [] lo [] med [] hi   Temperature: [] lo [] med [] hi   [] Skin assessment post-treatment:  []intact []redness- no adverse reaction    []redness  adverse reaction:       min []Eval                  []Re-Eval       19 min Therapeutic Exercise:  [x] See flow sheet :   Rationale: increase ROM, increase strength and improve coordination to improve the patients ability to aid with increase tolerance to ADLs and activities     min Therapeutic Activity:  []  See flow sheet :   Rationale:   to improve the patients ability to       min Neuromuscular Re-education:  []  See flow sheet :   Rationale:   to improve the patients ability to     10 min Manual Therapy:  STM DTM B LS TPR Left > right   Rationale: decrease pain, increase ROM, increase tissue extensibility and decrease trigger points to aid with increase tolerance to ADLs and activities      min Gait Training:  ___ feet with ___ device on level surfaces with ___ level of assist   Rationale: With   [] TE   [] TA   [] neuro   [] other: Patient Education: [x] Review HEP    [] Progressed/Changed HEP based on:   [] positioning   [] body mechanics   [] transfers   [] heat/ice application    [] other:      Other Objective/Functional Measures: VC exercises and tech  Pain Level (0-10 scale) post treatment: <4    ASSESSMENT/Changes in Function: tolerated well with residual TTP and STC Left > right LS region. Advised patient we could not complete medical paperwork     Patient will continue to benefit from skilled PT services to modify and progress therapeutic interventions, address functional mobility deficits, address ROM deficits, address strength deficits, analyze and address soft tissue restrictions, analyze and cue movement patterns, analyze and modify body mechanics/ergonomics, assess and modify postural abnormalities and instruct in home and community integration to attain remaining goals.      [x]  See Plan of Care  []  See progress note/recertification  []  See Discharge Summary         Progress towards goals / Updated goals:   Short Term Goals: To be accomplished in 5 treatments:  1.  Pt will be compliant and independent with HEP in order to facilitate PT sessions and aid with self management                        Eval Status:  Initiated                        CXMMBUS Status: progressing 5/25/18   6/15/18  2.  Pt to tolerate 30 min or more of TE and/or Interventions w/o increased s/s                        Eval Status:  Initiated                        KHXBMRK PZCJPM:       72 6/15/18                 Long Term Goals: To be accomplished in 10 treatments:  1.  Pt will report 50% improvement or better with involvement to show a significant increase in function                        Eval Status:  Initiated                        HMTPTCP Status: 36  5/25/18  2.  Pt will ambulate 500' w/o left antalgic gait for progress to usual community ambulation w/o difficulty                         Eval Status:  Initiated                        Current Status:  3.  Pt will demonstrate the ability to perform (B) SLR to 70* or better to show increased strength, function and decreased neural tension Left LE                           Eval Status:  Initiated                        Current Status:  4.  Pt will demonstrate the ability to walk 2 flights of steps with a reciprocal gait patter with little discomfort to be able to walk up her steps at the end of the day w/o discomfort to prepare for bed                           Eval Status:  Initiated                        TIQYIGP Status:  5.  Pt will improve knee FOTO score to 53 in 14 visits  to show significant improvement in function for progress to independent community ambulation and return to work                        Eval Status: 30               Current 40 6/8/18       PLAN  [x]  Upgrade activities as tolerated     [x]  Continue plan of care  []  Update interventions per flow sheet       []  Discharge due to:_  []  Other:_      Dora Abraham, PT 6/15/2018  3:29 PM    Future Appointments  Date Time Provider Marcello Devine   6/21/2018 3:30 PM Sivan Sanders [facility]   [facility]25[facility]2018 4[facility]30 [facility]   6/26/2018 10:20 AM Cathy Crain  [address]   7/10/2018 2:45 PM Sobeida Cadena  [address]

## 2018-06-18 ENCOUNTER — APPOINTMENT (OUTPATIENT)
Dept: PHYSICAL THERAPY | Age: 38
End: 2018-06-18
Payer: SELF-PAY

## 2018-06-18 NOTE — TELEPHONE ENCOUNTER
I called Pt in regards to concerns  With paperwork. Pt sts that she spoke with her HR department and they sts that Nahed Escalona will not fill out the paperwork. Pt sts that she was told by HR that Per Nahed Escalona she will need to see the spine doctor. Pt sts that she would like to make Emeli aware that she is going to see the spine doctor on the 26th. Nahed Escalona this is Just FYI!

## 2018-06-18 NOTE — TELEPHONE ENCOUNTER
Noted.  I did complete the last requested form from the insurance company which requested my last office note.

## 2018-06-21 ENCOUNTER — HOSPITAL ENCOUNTER (OUTPATIENT)
Dept: PHYSICAL THERAPY | Age: 38
Discharge: HOME | End: 2018-06-21
Payer: SELF-PAY

## 2018-06-21 PROCEDURE — 97140 MANUAL THERAPY 1/> REGIONS: CPT

## 2018-06-21 NOTE — PROGRESS NOTES
PT DAILY TREATMENT NOTE - [facility]     Patient Name: Dana Robledo  Date:2018  : 1980  [x]  Patient  Verified  Payor: BLUE CROSS / Plan: Taylor Felix [id number] PPO / Product Type: PPO /    In time: 3:37  Out time:4:35  Total Treatment Time (min): 59  Total Timed Codes (min): 49  1:1 Treatment Time ( only): 18  Visit #: 7 of 10    Treatment Area: Pain in left knee [M25.562]  Pain in left hip [M25.552]  Low back pain [M54.5]    SUBJECTIVE  Pain Level (0-10 scale): 4  Any medication changes, allergies to medications, adverse drug reactions, diagnosis change, or new procedure performed?: [x] No    [] Yes (see summary sheet for update)  Subjective functional status/changes:   [] No changes reported  Still  Some pain.     OBJECTIVE    Modality rationale: decrease edema, decrease inflammation, decrease pain and increase tissue extensibility to improve the patients ability to perform ADL    Min Type Additional Details    [] Estim:  []Unatt       []IFC  []Premod                        []Other:  []w/ice   []w/heat  Position:  Location:   8 [x] Estim: [x]Att    []TENS instruct  []NMES                    [x]Other: LTO []w/US   []w/ice   []w/heat  Position:prone  Location:left  LSP    []  Traction: [] Cervical       []Lumbar                       [] Prone          []Supine                       []Intermittent   []Continuous Lbs:  [] before manual  [] after manual    []  Ultrasound: []Continuous   [] Pulsed                           []1MHz   []3MHz W/cm2:  Location:    []  Iontophoresis with dexamethasone         Location: [] Take home patch   [] In clinic   10 [x]  Ice post    []  heat  []  Ice massage  []  Laser   []  Anodyne Position:prone  Location:(B) LSP    []  Laser with stim  []  Other:  Position:  Location:    []  Vasopneumatic Device Pressure:       [] lo [] med [] hi   Temperature: [] lo [] med [] hi   [x] Skin assessment post-treatment:  [x]intact []redness- no adverse reaction    []redness  adverse reaction:      min []Eval                  []Re-Eval       31  No  1:1 min Therapeutic Exercise:  [x] See flow sheet :   Rationale: increase ROM and increase strength to improve the patients ability to perform  ADL     min Therapeutic Activity:  []  See flow sheet :   Rationale:   to improve the patients ability to       min Neuromuscular Re-education:  []  See flow sheet :   Rationale:   to improve the patients ability to     10 min Manual Therapy:  P/A/sacrum  Mob left  LE  distraction   Rationale: decrease pain, increase ROM, increase tissue extensibility and decrease edema  to perform  ADL      min Gait Training:  ___ feet with ___ device on level surfaces with ___ level of assist   Rationale: With   [x] TE   [] TA   [] neuro   [] other: Patient Education: [x] Review HEP    [] Progressed/Changed HEP based on:   [] positioning   [] body mechanics   [] transfers   [] heat/ice application    [] other:      Other Objective/Functional Measures: TTP  At left  LSP/elevated   Left  sacrum    Pain Level (0-10 scale) post treatment: 4    ASSESSMENT/Changes in Function: Continued  With moderate  Residual  Pain. Discussed with pt on icing  At  Home. Patient will continue to benefit from skilled PT services to address functional mobility deficits, address ROM deficits, address strength deficits, analyze and address soft tissue restrictions, analyze and cue movement patterns and instruct in home and community integration to attain remaining goals.      [x]  See Plan of Care  []  See progress note/recertification  []  See Discharge Summary         Progress towards goals / Updated goals:  Short Term Goals: To be accomplished in 5 treatments:  1.  Pt will be compliant and independent with HEP in order to facilitate PT sessions and aid with self management                        Eval Status:  Initiated                        OEXAUXU Status: progressing 5/25/18   6/15/18  2.  Pt to tolerate 30 min or more of TE and/or Interventions w/o increased s/s                        Eval Status:  Initiated                        NXGGLPT FPXNFK:       80 6/15/18                 Long Term Goals: To be accomplished in 10 treatments:  1.  Pt will report 50% improvement or better with involvement to show a significant increase in function                        Eval Status:  Initiated                        NSJUNUN Status: 36  5/25/18  2.  Pt will ambulate 500' w/o left antalgic gait for progress to usual community ambulation w/o difficulty                         Eval Status:  Initiated                        Current Status:  3.  Pt will demonstrate the ability to perform (B) SLR to 70* or better to show increased strength, function and decreased neural tension Left LE                           Eval Status:  Initiated                        Current Status:  4.  Pt will demonstrate the ability to walk 2 flights of steps with a reciprocal gait patter with little discomfort to be able to walk up her steps at the end of the day w/o discomfort to prepare for bed                           Eval Status:  Initiated                        KPBDWEY Status:  5.  Pt will improve knee FOTO score to 53 in 14 visits  to show significant improvement in function for progress to independent community ambulation and return to work                        Eval Status: 30                                    Current 40 6/8/18    PLAN  []  Upgrade activities as tolerated     []  Continue plan of care  []  Update interventions per flow sheet       []  Discharge due to:_  []  Other:_      Judit Urrutia, ANNETTE 6/21/2018  3:41 PM    Future Appointments  Date Time Provider Marcello Devine   [facility]2018 4[facility]   6/26/2018 10:20 AM Nicole Kelly  [address]   6/27/2018 2:00 PM [facility]   7/10/2018 2:45 PM Kevin Moreno  [address]

## 2018-06-25 ENCOUNTER — HOSPITAL ENCOUNTER (OUTPATIENT)
Dept: PHYSICAL THERAPY | Age: 38
Discharge: HOME | End: 2018-06-25
Payer: SELF-PAY

## 2018-06-25 PROCEDURE — 97110 THERAPEUTIC EXERCISES: CPT

## 2018-06-25 PROCEDURE — 97032 APPL MODALITY 1+ESTIM EA 15: CPT

## 2018-06-25 NOTE — PROGRESS NOTES
PT DAILY TREATMENT NOTE - [facility]     Patient Name: Mango Cheema  Date:2018  : 1980  [x]  Patient  Verified  Payor: BLUE CROSS / Plan: Taylor Felix [id number] PPO / Product Type: PPO /    In time:4:33   Out time: 5:13  Total Treatment Time (min): 33  Total Timed Codes (min): 33  1:1 Treatment Time ( only): 33  Visit #: 8 of 10    Treatment Area: Pain in left knee [M25.562]  Pain in left hip [M25.552]  Low back pain [M54.5]    SUBJECTIVE  Pain Level (0-10 scale): 4  Any medication changes, allergies to medications, adverse drug reactions, diagnosis change, or new procedure performed?: [x] No    [] Yes (see summary sheet for update)  Subjective functional status/changes:   [] No changes reported  Pain  Still  The  Same. See  Spine  Specialist  Tomorrow.     OBJECTIVE    Modality rationale: decrease edema, decrease inflammation, decrease pain and increase tissue extensibility to improve the patients ability to perform ADL    Min Type Additional Details    [] Estim:  []Unatt       []IFC  []Premod                        []Other:  []w/ice   []w/heat  Position:  Location:   10 [x] Estim: [x]Att    []TENS instruct  []NMES                    [x]Other:LTO  []w/US   []w/ice   []w/heat  Position:prone  Location:(L) LSP    []  Traction: [] Cervical       []Lumbar                       [] Prone          []Supine                       []Intermittent   []Continuous Lbs:  [] before manual  [] after manual    []  Ultrasound: []Continuous   [] Pulsed                           []1MHz   []3MHz W/cm2:  Location:    []  Iontophoresis with dexamethasone         Location: [] Take home patch   [] In clinic    []  Ice     []  heat  []  Ice massage  []  Laser   []  Anodyne Position:  Location:    []  Laser with stim  []  Other:  Position:  Location:    []  Vasopneumatic Device Pressure:       [] lo [] med [] hi   Temperature: [] lo [] med [] hi   [x] Skin assessment post-treatment:  [x]intact []redness- no adverse reaction    []redness  adverse reaction:      min []Eval                  []Re-Eval       23 min Therapeutic Exercise:  [x] See flow sheet :   Rationale: increase ROM and increase strength to improve the patients ability to perform ADL      min Therapeutic Activity:  []  See flow sheet :   Rationale:   to improve the patients ability to       min Neuromuscular Re-education:  []  See flow sheet :   Rationale:   to improve the patients ability to      min Manual Therapy:     Rationale: decrease pain, increase ROM, increase tissue extensibility and decrease edema  to perform  ADL     min Gait Training:  ___ feet with ___ device on level surfaces with ___ level of assist   Rationale: With   [x] TE   [] TA   [] neuro   [] other: Patient Education: [x] Review HEP    [] Progressed/Changed HEP based on:   [] positioning   [] body mechanics   [] transfers   [] heat/ice application    [] other:      Other Objective/Functional Measures:  TTP  At (L) LSP    Pain Level (0-10 scale) post treatment: 2    ASSESSMENT/Changes in Function: Benefited  With treatment  Today. Patient will continue to benefit from skilled PT services to address functional mobility deficits, address ROM deficits, address strength deficits, analyze and address soft tissue restrictions, analyze and cue movement patterns and instruct in home and community integration to attain remaining goals.      [x]  See Plan of Care  []  See progress note/recertification  []  See Discharge Summary         Progress towards goals / Updated goals:  Short Term Goals: To be accomplished in 5 treatments:  1.  Pt will be compliant and independent with HEP in order to facilitate PT sessions and aid with self management                        Eval Status:  Initiated                        Current Status: progressing 5/25/18   6/15/18  2.  Pt to tolerate 30 min or more of TE and/or Interventions w/o increased s/s                        Eval Status:  Initiated                        YMRRDKL IIFCAV:       74 6/15/18                 Long Term Goals: To be accomplished in 10 treatments:  1.  Pt will report 50% improvement or better with involvement to show a significant increase in function                        Eval Status:  Initiated                        VNXTFXC Status: 36  5/25/18  2.  Pt will ambulate 500' w/o left antalgic gait for progress to usual community ambulation w/o difficulty                         Eval Status:  Initiated                        Current Status:  3.  Pt will demonstrate the ability to perform (B) SLR to 70* or better to show increased strength, function and decreased neural tension Left LE                           Eval Status:  Initiated                        Current Status:  4.  Pt will demonstrate the ability to walk 2 flights of steps with a reciprocal gait patter with little discomfort to be able to walk up her steps at the end of the day w/o discomfort to prepare for bed                           Eval Status:  Initiated                        VQXCWAA Status:  5.  Pt will improve knee FOTO score to 53 in 14 visits  to show significant improvement in function for progress to independent community ambulation and return to work                        Eval Status: 30                                    Current 40 6/8/18    PLAN  []  Upgrade activities as tolerated     []  Continue plan of care  []  Update interventions per flow sheet       []  Discharge due to:_  [x]  Other:_  REASSESS SLR ENZO Urrutia PTA 6/25/2018  4:43 PM    Future Appointments  Date Time Provider Marcello Devine   6/26/2018 10:20 AM Ema Masterson  [address]   6/27/2018 2:00 PM Yi Driver, PT [facility]   7/10/2018 2:45 PM Enoc Porter  [address]

## 2018-06-26 ENCOUNTER — OFFICE VISIT (OUTPATIENT)
Dept: ORTHOPEDIC SURGERY | Age: 38
End: 2018-06-26

## 2018-06-26 VITALS
HEIGHT: 67 IN | WEIGHT: 284 LBS | OXYGEN SATURATION: 100 % | TEMPERATURE: 98.5 F | DIASTOLIC BLOOD PRESSURE: 66 MMHG | BODY MASS INDEX: 44.57 KG/M2 | SYSTOLIC BLOOD PRESSURE: 114 MMHG | RESPIRATION RATE: 16 BRPM | HEART RATE: 75 BPM

## 2018-06-26 DIAGNOSIS — M54.42 ACUTE BILATERAL LOW BACK PAIN WITH LEFT-SIDED SCIATICA: Primary | ICD-10-CM

## 2018-06-26 DIAGNOSIS — M51.36 DDD (DEGENERATIVE DISC DISEASE), LUMBAR: ICD-10-CM

## 2018-06-26 DIAGNOSIS — R20.0 NUMBNESS OF TOES: ICD-10-CM

## 2018-06-26 PROBLEM — M54.40 ACUTE BILATERAL LOW BACK PAIN WITH SCIATICA: Status: ACTIVE | Noted: 2018-06-26

## 2018-06-26 RX ORDER — NABUMETONE 500 MG/1
500 TABLET, FILM COATED ORAL 2 TIMES DAILY WITH MEALS
Qty: 60 TAB | Refills: 1 | Status: SHIPPED | OUTPATIENT
Start: 2018-06-26 | End: 2019-05-15

## 2018-06-26 NOTE — MR AVS SNAPSHOT
[facility] 
 
 
 [address] [facility] [address] 
[phone] Patient: Garry De Leon MRN: [id number] :1980 Visit Information Date & Time Provider Department Dept. Phone Encounter #  
 2018 10:20 AM Etta Joya NP [facility] [phone] [id number] Follow-up Instructions Return  with sherman Max. Follow-up and Disposition History Your Appointments 7/10/2018  2:45 PM  
Follow Up with Irais Wallace MD  
[facility] [address] [facility]) Appt Note: 1 MO PT & Med F/U; PT CALLED AND RS 18 FOR THIS NEW DATE. [address] [facility] [address] [phone]  
  
   
 [address] [facility] [address] Upcoming Health Maintenance Date Due DTaP/Tdap/Td series (1 - Tdap) 10/10/2001 PAP AKA CERVICAL CYTOLOGY 2018 Influenza Age 5 to Adult 10/11/2018* *Topic was postponed. The date shown is not the original due date. Allergies as of 2018  Review Complete On: 2018 By: Juice Lambert LPN No Known Allergies Current Immunizations  Never Reviewed No immunizations on file. Not reviewed this visit You Were Diagnosed With   
  
 Codes Comments Acute bilateral low back pain with left-sided sciatica    -  Primary ICD-10-CM: M54.42 
ICD-9-CM: 724.2, 724.3 Numbness of toes     ICD-10-CM: R20.0 ICD-9-CM: 782.0   
 DDD (degenerative disc disease), lumbar     ICD-10-CM: M51.36 
ICD-9-CM: 722.52 Vitals BP Pulse Temp Resp Height(growth percentile) Weight(growth percentile) 114/66 (BP 1 Location: Left arm, BP Patient Position: Sitting) 75 98.5 °F (36.9 °C) (Oral) 16 5' 7\" (1.702 m) 284 lb (128.8 kg) LMP SpO2 BMI OB Status Smoking Status  2018 (Exact Date) 100% 44.48 kg/m2 Having regular periods Never Smoker BMI and BSA Data Body Mass Index Body Surface Area 44.48 kg/m 2 2.47 m 2 Preferred Pharmacy Pharmacy Name Phone [address] [place] [address]. [phone] Your Updated Medication List  
  
   
This list is accurate as of 6/26/18 11:52 AM.  Always use your most recent med list.  
  
  
  
  
 gabapentin 300 mg capsule Commonly known as:  NEURONTIN  
1 in the am and 2 in the evening as directed  Indications: NEUROPATHIC PAIN  
  
 ibuprofen 600 mg tablet Commonly known as:  MOTRIN Take 1 Tab by mouth every six (6) hours as needed for Pain. methocarbamol 500 mg tablet Commonly known as:  ROBAXIN Take 1 Tab by mouth four (4) times daily. methylPREDNISolone 4 mg tablet Commonly known as:  Camella Cunas Take as directed  
  
 nabumetone 500 mg tablet Commonly known as:  RELAFEN Take 1 Tab by mouth two (2) times daily (with meals). traMADol 50 mg tablet Commonly known as:  ULTRAM  
Take 1 Tab by mouth every six (6) hours as needed for Pain. Max Daily Amount: 200 mg.  
  
 TYLENOL 325 mg tablet Generic drug:  acetaminophen Take  by mouth every four (4) hours as needed for Pain. Prescriptions Sent to Pharmacy Refills  
 nabumetone (RELAFEN) 500 mg tablet 1 Sig: Take 1 Tab by mouth two (2) times daily (with meals). Class: Normal  
 Pharmacy: [address] .  #: [phone] Route: Oral  
  
Follow-up Instructions Return july 10th with sherman Riggins To-Do List   
 06/26/2018 Imaging:  MRI LUMB SPINE WO CONT   
  
 06/27/2018 2:00 PM  
  Appointment with Torie Coburn, PT at [facility]  [facility] ([phone])  
  
 07/03/2018 7:00 PM  
  Appointment with [facility] MRI RM 2 at [facility] ([phone])  GENERAL INSTRUCTIONS  Bring information (ID card) if you have any medically implanted devices. You will be required to lie still while the procedure is being performed. Remove any jewelry (including body piercing, hairpins) prior to MRI. If you have had a creatinine level drawn within the past 30 days, please bring most recent results to your appt. Bring any films, CD's, and reports related to your study with you on the day of your exam.  This only includes studies done outside of [address] [place] [address], [place] [address], [place], and [facility]. Bring a complete list of all medications you are currently taking to include prescriptions, over-the-counter meds, herbals, vitamins & any dietary supplements. If you were given medications for claustrophobia or anxiety, please arrange to have someone drive you to your appointment. QUESTIONS  Notify the MRI Department if you have any questions concerning your study. [place] - [phone] [address] [phone] Referral Information Referral ID Referred By Referred To  
  
 [id number] Pender Duncan MA Not Available Visits Status Start Date End Date 1 New Request 6/26/18 6/26/19 If your referral has a status of pending review or denied, additional information will be sent to support the outcome of this decision. Introducing [facility]! [address] introduces #waywire patient portal. Now you can access parts of your medical record, email your doctor's office, and request medication refills online. 1. In your internet browser, go to [email or web address] 2. Click on the First Time User? Click Here link in the Sign In box. You will see the New Member Sign Up page. 3. Enter your #waywire Access Code exactly as it appears below. You will not need to use this code after youve completed the sign-up process. If you do not sign up before the expiration date, you must request a new code. · #waywire Access Code: [email or web address] Expires: 6/28/2018  2:41 PM 
 
4. Enter the last four digits of your Social Security Number (xxxx) and Date of Birth (mm/dd/yyyy) as indicated and click Submit. You will be taken to the next sign-up page. 5. Create a Transaq ID. This will be your Transaq login ID and cannot be changed, so think of one that is secure and easy to remember. 6. Create a Transaq password. You can change your password at any time. 7. Enter your Password Reset Question and Answer. This can be used at a later time if you forget your password. 8. Enter your e-mail address. You will receive e-mail notification when new information is available in [address] 9. Click Sign Up. You can now view and download portions of your medical record. 10. Click the Download Summary menu link to download a portable copy of your medical information. If you have questions, please visit the Frequently Asked Questions section of the Transaq website. Remember, Transaq is NOT to be used for urgent needs. For medical emergencies, dial 911. Now available from your iPhone and Android! Please provide this summary of care documentation to your next provider. Your primary care clinician is listed as Jessica Huitron. If you have any questions after today's visit, please call [phone].

## 2018-06-26 NOTE — PROGRESS NOTES
Chief complaint/History of Present Illness:  Chief Complaint   Patient presents with    Back Pain     Low back pain    Leg Pain     Left leg pain     HPI  Beltran Morgan is a  40 y.o.  female      HISTORY OF PRESENT ILLNESS:  The patient comes in today stating she continues to have low back pain with radiating left leg pain and numbness in her left toes. She states all this started on 05/06/2018 without injury. She took herself out of work 05/09/2018 and basically has been out of work since then. Her PCP returned her back to work, she states, about 06/09/2018, but she had to leave work due to her pain. She states she rates her pain at a 5/10. It is a back pain with radiating left leg pain with numbness in her toes. She has been placed in physical therapy. The last visit was supposed to be 06/20/2018. It shows her being a no-show for that. She states she did call the physical therapist because her pain was so bad she was going to the ER, but she ended up deciding to just go home and lay down. She does have an appointment with them either today or tomorrow. She states [facility] because she took her kids to [facility] that she states was preplanned. She has been taking Neurontin 300 mg in the morning and 600 mg at night. She states it really does not help. She did take some tramadol through her PCP, but she only takes it just as needed, and she was taking ibuprofen 600 mg that Dr[facility] gave her that she ran out of and is now taking 800 mg p.r.n. She states that only helps a little bit. Medrol Dosepak in the past did help some. She states therapy is still ongoing, but it only helps a very little bit. I think her last visit is tomorrow. She works at [email or web address]. She denies fever, bowel or bladder dysfunction. She has had her tubes tied and says she cannot get pregnant. PHYSICAL EXAMINATION:  Ms. Tylor Parson is a 59-year-old female.   She is alert and oriented. She has a full-weightbearing nonantalgic gait, although she does seem to favor that left leg somewhat. No assistive device. Strength 5/5 of bilateral lower extremities. Negative straight leg raise. No pain with hyperextension of lumbar spine. ASSESSMENT/PLAN:  This is a patient who does have some decreased disk space at L5-S1 on x-ray, so she may have some degenerative disc disease there. This has been ongoing now for over 6 to 7 weeks. She has been on antiinflammatories, antineuritics, has done physical therapy. She states she has not been able to work due to her pain level. I think it is time we move on to an MRI to see what is going on. I told her I cannot really comment on her work status, that we did not take her out of work, and that her x-rays really did not show enough to keep her out of work, but we will get an MRI to see if anything is compressing that nerve root due to that degenerated disc. She has an appointment with Dr. Bel Khan on 06/10/2018, and we will see her back at that time. I did change the ibuprofen to Relafen to see if that will help more. She knows not to take both of them together. Review of systems:    No past medical history on file. Past Surgical History:   Procedure Laterality Date    HX BACK SURGERY      removed abscess     Social History     Social History    Marital status:      Spouse name: N/A    Number of children: N/A    Years of education: N/A     Occupational History    Not on file.      Social History Main Topics    Smoking status: Never Smoker    Smokeless tobacco: Never Used    Alcohol use No    Drug use: No    Sexual activity: Yes     Partners: Male     Birth control/ protection: None     Other Topics Concern    Caffeine Concern Yes     daily 12oz    Sleep Concern No    Stress Concern No    Weight Concern No    Exercise No    Seat Belt No    Self-Exams Yes     Social History Narrative     Family History Problem Relation Age of Onset    Stroke Mother 54    Hypertension Mother     High Cholesterol Mother     Hypertension Brother        Physical Exam:  Visit Vitals    /66 (BP 1 Location: Left arm, BP Patient Position: Sitting)    Pulse 75    Temp 98.5 °F (36.9 °C) (Oral)    Resp 16    Ht 5' 7\" (1.702 m)    Wt 284 lb (128.8 kg)    LMP 06/21/2018 (Exact Date)    SpO2 100%    BMI 44.48 kg/m2     Pain Scale: /10          has been . reviewed and is appropriate          Diagnoses and all orders for this visit:    1. Acute bilateral low back pain with left-sided sciatica  -     MRI LUMB SPINE WO CONT; Future    2. Numbness of toes  -     MRI LUMB SPINE WO CONT; Future    3. DDD (degenerative disc disease), lumbar    Other orders  -     nabumetone (RELAFEN) 500 mg tablet; Take 1 Tab by mouth two (2) times daily (with meals). Follow-up Disposition:  Return july 10th with sherman Daly.         We have informed Frantz Solares to notify us for immediate appointment if she has any worsening neurogical symptoms or if an emergency situation presents, then call 911

## 2018-06-27 ENCOUNTER — HOSPITAL ENCOUNTER (OUTPATIENT)
Dept: PHYSICAL THERAPY | Age: 38
Discharge: HOME | End: 2018-06-27
Payer: SELF-PAY

## 2018-06-27 PROCEDURE — 97110 THERAPEUTIC EXERCISES: CPT

## 2018-06-27 NOTE — TELEPHONE ENCOUNTER
Pt request return call. Pt said she did see spine doctor 06/26/2018 and needs paperwork that Karl Barbour has  completed.  If Karl Barbour is unable to complete the paperwork request it be sent to the spine doctor

## 2018-06-27 NOTE — PROGRESS NOTES
PT DAILY TREATMENT NOTE - [facility]     Patient Name: Yogi Asencio  Date:2018  : 1980  [x]  Patient  Verified  Payor: Alexa Orozco / Plan: [address] [place] [address] / Product Type: PPO /    In time:2:01  Out time:2:40  Total Treatment Time (min): 39  Total Timed Codes (min): 29  1:1 Treatment Time ( only): 14   Visit #: 9 of 10    Treatment Area: Pain in left knee [M25.562]  Pain in left hip [M25.552]  Low back pain [M54.5]    SUBJECTIVE  Pain Level (0-10 scale): 0  Any medication changes, allergies to medications, adverse drug reactions, diagnosis change, or new procedure performed?: [x] No    [] Yes (see summary sheet for update)  Subjective functional status/changes:   [] No changes reported  Patient states that she occasionally feels pain into her left leg when standing or walking for long periods of time. OBJECTIVE    Modality rationale: decrease edema, decrease inflammation and decrease pain to improve the patients ability to perform ADLs at home.    Min Type Additional Details    [] Estim:  []Unatt       []IFC  []Premod                        []Other:  []w/ice   []w/heat  Position:  Location:    [] Estim: []Att    []TENS instruct  []NMES                    []Other:  []w/US   []w/ice   []w/heat  Position:  Location:    []  Traction: [] Cervical       []Lumbar                       [] Prone          []Supine                       []Intermittent   []Continuous Lbs:  [] before manual  [] after manual    []  Ultrasound: []Continuous   [] Pulsed                           []1MHz   []3MHz W/cm2:  Location:    []  Iontophoresis with dexamethasone         Location: [] Take home patch   [] In clinic   10 [x]  Ice     []  heat  []  Ice massage  []  Laser   []  Anodyne Position:prone  Location: low back    []  Laser with stim  []  Other:  Position:  Location:    []  Vasopneumatic Device Pressure:       [] lo [] med [] hi   Temperature: [] lo [] med [] hi   [] Skin assessment post-treatment: []intact []redness- no adverse reaction    []redness  adverse reaction:       29 min Therapeutic Exercise:  [x] See flow sheet :   Rationale: increase ROM, increase strength, improve coordination and improve balance to improve the patients ability to perform daily household chores. With   [] TE   [] TA   [] neuro   [] other: Patient Education: [x] Review HEP    [] Progressed/Changed HEP based on:   [] positioning   [] body mechanics   [] transfers   [] heat/ice application    [] other:      Other Objective/Functional Measures: Patient requires      Pain Level (0-10 scale) post treatment: 0    ASSESSMENT/Changes in Function: Patient continues to show improvements with signs/ symptoms however still demonstrates a decrease in strength, impaired ROM, deficits with balance and an increase in pain with functional activities. Patient will continue to benefit from skilled PT services to modify and progress therapeutic interventions, address functional mobility deficits, address ROM deficits, address strength deficits, analyze and address soft tissue restrictions, analyze and cue movement patterns and assess and modify postural abnormalities to attain remaining goals.      [x]  See Plan of Care  []  See progress note/recertification  []  See Discharge Summary         Progress towards goals / Updated goals:  Short Term Goals: To be accomplished in 5 treatments:  1.  Pt will be compliant and independent with HEP in order to facilitate PT sessions and aid with self management                        Eval Status:  Initiated                        Current Status: progressing 5/25/18   6/15/18  2.  Pt to tolerate 30 min or more of TE and/or Interventions w/o increased s/s                        Eval Status:  Initiated                        OVZUKMK QEXRQN:       19 6/15/18    , 29 6/27/18             Long Term Goals: To be accomplished in 10 treatments:  1.  Pt will report 50% improvement or better with involvement to show a significant increase in function                        Eval Status:  Initiated                        OVBPCKH Status: 36  5/25/18  2.  Pt will ambulate 500' w/o left antalgic gait for progress to usual community ambulation w/o difficulty                         Eval Status:  Initiated                        VSQMLFC Status:  3.  Pt will demonstrate the ability to perform (B) SLR to 70* or better to show increased strength, function and decreased neural tension Left LE                           Eval Status:  Initiated                        Current Status:   4.  Pt will demonstrate the ability to walk 2 flights of steps with a reciprocal gait patter with little discomfort to be able to walk up her steps at the end of the day w/o discomfort to prepare for bed                           Eval Status:  Initiated                        LEYBEMP Status:  5.  Pt will improve knee FOTO score to 53 in 14 visits  to show significant improvement in function for progress to independent community ambulation and return to work                        Eval Status: 30                                    Current 40 6/8/18       PLAN  []  Upgrade activities as tolerated     [x]  Continue plan of care  []  Update interventions per flow sheet       []  Discharge due to:_  []  Other:_      Izaiah Feliciano, PT 6/27/2018  2:17 PM    Future Appointments  Date Time Provider Marcello Devine   7/3/2018 7:00 PM [facility] RM 2 [facility]   7/10/2018 8:50 AM Layla Shaffer  [address]   7/10/2018 2:45 PM Layla Shaffer  [address]

## 2018-07-02 NOTE — TELEPHONE ENCOUNTER
I called Pt in regards to Rx. Informed Pt that Rx was refilled and sent to the Pharmacy. Pt verbalized understanding.

## 2018-07-02 NOTE — TELEPHONE ENCOUNTER
I called Pt in regards to [facility] paperwork. Informed Pt that Paperwork was sent to the spine specialist office. Mariano Britt only put her for a few days no longer term. Pt verbalized understanding.

## 2018-07-03 ENCOUNTER — HOSPITAL ENCOUNTER (OUTPATIENT)
Age: 38
Discharge: HOME | End: 2018-07-03
Attending: NURSE PRACTITIONER
Payer: SELF-PAY

## 2018-07-03 ENCOUNTER — TELEPHONE (OUTPATIENT)
Dept: FAMILY MEDICINE CLINIC | Age: 38
End: 2018-07-03

## 2018-07-03 ENCOUNTER — DOCUMENTATION ONLY (OUTPATIENT)
Dept: ORTHOPEDIC SURGERY | Age: 38
End: 2018-07-03

## 2018-07-03 DIAGNOSIS — M54.42 ACUTE BILATERAL LOW BACK PAIN WITH LEFT-SIDED SCIATICA: ICD-10-CM

## 2018-07-03 DIAGNOSIS — R20.0 NUMBNESS OF TOES: ICD-10-CM

## 2018-07-03 PROCEDURE — 72148 MRI LUMBAR SPINE W/O DYE: CPT

## 2018-07-03 NOTE — TELEPHONE ENCOUNTER
Pt called today in regards to her FMLA paperwork she states she is not satisfied as to how it was completed because no one truly knows how she feels. I advised her that the paper work would have to be completed by her Spine specialist but she doesn't want to pay the $20 fee they require. Pt wants provider to know that she is in a lot of pain and still not able to work.

## 2018-07-05 NOTE — TELEPHONE ENCOUNTER
Please advise patient I am unfortunately unable to complete her FMLA forms. I understand that is in pain however, she's under the care of Dr. Van Moss for her back pain and that office needs to complete her FMLA forms.   Thanks, JD QuirogaC

## 2018-07-06 NOTE — TELEPHONE ENCOUNTER
I called Pt in regards to [facility] paperwork. LM on  for return call. Will try again later.

## 2018-07-10 ENCOUNTER — OFFICE VISIT (OUTPATIENT)
Dept: ORTHOPEDIC SURGERY | Age: 38
End: 2018-07-10

## 2018-07-10 VITALS
OXYGEN SATURATION: 100 % | BODY MASS INDEX: 45.04 KG/M2 | DIASTOLIC BLOOD PRESSURE: 83 MMHG | RESPIRATION RATE: 17 BRPM | WEIGHT: 287 LBS | HEIGHT: 67 IN | TEMPERATURE: 97.4 F | SYSTOLIC BLOOD PRESSURE: 126 MMHG | HEART RATE: 71 BPM

## 2018-07-10 DIAGNOSIS — M79.2 NEURITIS: ICD-10-CM

## 2018-07-10 DIAGNOSIS — M16.12 OSTEOARTHRITIS OF LEFT HIP, UNSPECIFIED OSTEOARTHRITIS TYPE: ICD-10-CM

## 2018-07-10 DIAGNOSIS — M54.16 LUMBAR RADICULAR PAIN: ICD-10-CM

## 2018-07-10 DIAGNOSIS — R29.898 LEFT LEG WEAKNESS: ICD-10-CM

## 2018-07-10 DIAGNOSIS — E66.01 OBESITY, MORBID (HCC): ICD-10-CM

## 2018-07-10 DIAGNOSIS — M51.26 HNP (HERNIATED NUCLEUS PULPOSUS), LUMBAR: Primary | ICD-10-CM

## 2018-07-10 RX ORDER — GABAPENTIN 300 MG/1
CAPSULE ORAL
Qty: 180 CAP | Refills: 3 | Status: SHIPPED | OUTPATIENT
Start: 2018-07-10 | End: 2019-05-15 | Stop reason: SDUPTHER

## 2018-07-10 NOTE — MR AVS SNAPSHOT
[address] [place] [address] [address] [facility] [address] 
[phone] Patient: Saniya Emmanuel MRN: [id number] :1980 Visit Information Date & Time Provider Department Dept. Phone Encounter #  
 7/10/2018  8:50 AM Mariana Nesbitt MD [facility] [phone] Follow-up Instructions Return in about 3 weeks (around 2018) for Medication follow up. Upcoming Health Maintenance Date Due DTaP/Tdap/Td series (1 - Tdap) 10/10/2001 PAP AKA CERVICAL CYTOLOGY 2018 Influenza Age 5 to Adult 10/11/2018* *Topic was postponed. The date shown is not the original due date. Allergies as of 7/10/2018  Review Complete On: 7/10/2018 By: Mariana Nesbitt MD  
 No Known Allergies Current Immunizations  Never Reviewed No immunizations on file. Not reviewed this visit You Were Diagnosed With   
  
 Codes Comments HNP (herniated nucleus pulposus), lumbar    -  Primary ICD-10-CM: M51.26 
ICD-9-CM: 722.10 Neuritis     ICD-10-CM: M79.2 ICD-9-CM: 729.2 Lumbar radicular pain     ICD-10-CM: M54.16 
ICD-9-CM: 724.4 Vitals BP Pulse Temp Resp Height(growth percentile) Weight(growth percentile) 126/83 71 97.4 °F (36.3 °C) (Oral) 17 5' 7\" (1.702 m) 287 lb (130.2 kg) LMP SpO2 BMI OB Status Smoking Status 2018 (Exact Date) 100% 44.95 kg/m2 Having regular periods Never Smoker Vitals History BMI and BSA Data Body Mass Index Body Surface Area 44.95 kg/m 2 2.48 m 2 Preferred Pharmacy Pharmacy Name Phone [address] [place] [address]. [phone] Your Updated Medication List  
  
   
This list is accurate as of 7/10/18 10:27 AM.  Always use your most recent med list.  
  
  
  
  
 * gabapentin 300 mg capsule Commonly known as:  NEURONTIN  
 1 in the am and 2 in the evening as directed  Indications: NEUROPATHIC PAIN  
  
 * gabapentin 300 mg capsule Commonly known as:  NEURONTIN  
2 po tid as directed  Indications: NEUROPATHIC PAIN  
  
 ibuprofen 600 mg tablet Commonly known as:  MOTRIN Take 1 Tab by mouth every six (6) hours as needed for Pain. methocarbamol 500 mg tablet Commonly known as:  ROBAXIN Take 1 Tab by mouth four (4) times daily. methylPREDNISolone 4 mg tablet Commonly known as:  Finderne Glee Take as directed  
  
 nabumetone 500 mg tablet Commonly known as:  RELAFEN Take 1 Tab by mouth two (2) times daily (with meals). traMADol 50 mg tablet Commonly known as:  ULTRAM  
Take 1 Tab by mouth every six (6) hours as needed for Pain. Max Daily Amount: 200 mg.  
  
 TYLENOL 325 mg tablet Generic drug:  acetaminophen Take  by mouth every four (4) hours as needed for Pain. * Notice: This list has 2 medication(s) that are the same as other medications prescribed for you. Read the directions carefully, and ask your doctor or other care provider to review them with you. Prescriptions Printed Refills  
 gabapentin (NEURONTIN) 300 mg capsule 3 Si po tid as directed  Indications: NEUROPATHIC PAIN Class: Print Follow-up Instructions Return in about 3 weeks (around 2018) for Medication follow up. Patient Instructions Low Back Arthritis: Exercises Your Care Instructions Here are some examples of typical rehabilitation exercises for your condition. Start each exercise slowly. Ease off the exercise if you start to have pain. Your doctor or physical therapist will tell you when you can start these exercises and which ones will work best for you.  
When you are not being active, find a comfortable position for rest. Some people are comfortable on the floor or a medium-firm bed with a small pillow under their head and another under their knees. Some people prefer to lie on their side with a pillow between their knees. Don't stay in one position for too long. Take short walks (10 to 20 minutes) every 2 to 3 hours. Avoid slopes, hills, and stairs until you feel better. Walk only distances you can manage without pain, especially leg pain. How to do the exercises Pelvic tilt 1. Lie on your back with your knees bent. 2. \"Brace\" your stomach-tighten your muscles by pulling in and imagining your belly button moving toward your spine. 3. Press your lower back into the floor. You should feel your hips and pelvis rock back. 4. Hold for 6 seconds while breathing smoothly. 5. Relax and allow your pelvis and hips to rock forward. 6. Repeat 8 to 12 times. Back stretches 1. Get down on your hands and knees on the floor. 2. Relax your head and allow it to droop. Round your back up toward the ceiling until you feel a nice stretch in your upper, middle, and lower back. Hold this stretch for as long as it feels comfortable, or about 15 to 30 seconds. 3. Return to the starting position with a flat back while you are on your hands and knees. 4. Let your back sway by pressing your stomach toward the floor. Lift your buttocks toward the ceiling. 5. Hold this position for 15 to 30 seconds. 6. Repeat 2 to 4 times. Follow-up care is a key part of your treatment and safety. Be sure to make and go to all appointments, and call your doctor if you are having problems. It's also a good idea to know your test results and keep a list of the medicines you take. Where can you learn more? Go to http://suzan-kinga.info/. Enter N882 in the search box to learn more about \"Low Back Arthritis: Exercises. \" Current as of: March 21, 2017 Content Version: 11.4 © 4916-7174 Healthwise, Incorporated.  Care instructions adapted under license by [employer] (which disclaims liability or warranty for this information). If you have questions about a medical condition or this instruction, always ask your healthcare professional. [address] any warranty or liability for your use of this information. Introducing [facility] SERVICES! [employer] introduces Vdopia patient portal. Now you can access parts of your medical record, email your doctor's office, and request medication refills online. 1. In your internet browser, go to [email or web address] 2. Click on the First Time User? Click Here link in the Sign In box. You will see the New Member Sign Up page. 3. Enter your Vdopia Access Code exactly as it appears below. You will not need to use this code after youve completed the sign-up process. If you do not sign up before the expiration date, you must request a new code. · Vdopia Access Code: [email or web address] Expires: 9/27/2018  9:54 AM 
 
4. Enter the last four digits of your Social Security Number (xxxx) and Date of Birth (mm/dd/yyyy) as indicated and click Submit. You will be taken to the next sign-up page. 5. Create a Vdopia ID. This will be your Vdopia login ID and cannot be changed, so think of one that is secure and easy to remember. 6. Create a Vdopia password. You can change your password at any time. 7. Enter your Password Reset Question and Answer. This can be used at a later time if you forget your password. 8. Enter your e-mail address. You will receive e-mail notification when new information is available in [address] 9. Click Sign Up. You can now view and download portions of your medical record. 10. Click the Download Summary menu link to download a portable copy of your medical information. If you have questions, please visit the Frequently Asked Questions section of the Vdopia website. Remember, Vdopia is NOT to be used for urgent needs. For medical emergencies, dial 911. Now available from your iPhone and Android! Please provide this summary of care documentation to your next provider. Your primary care clinician is listed as Roxanne Gordon. If you have any questions after today's visit, please call [phone].

## 2018-07-10 NOTE — PATIENT INSTRUCTIONS

## 2018-07-10 NOTE — PROGRESS NOTES
[facility]  [address] [place], [facility] [address]  Phone: [phone]  Fax: [phone]    Pt's YOB: 1980    ASSESSMENT   Diagnoses and all orders for this visit:    1. HNP (herniated nucleus pulposus), lumbar  -     gabapentin (NEURONTIN) 300 mg capsule; 2 po tid as directed  Indications: NEUROPATHIC PAIN    2. Neuritis  -     gabapentin (NEURONTIN) 300 mg capsule; 2 po tid as directed  Indications: NEUROPATHIC PAIN    3. Lumbar radicular pain    4. Left leg weakness    5. Osteoarthritis of left hip, unspecified osteoarthritis type    6. Obesity, morbid ([address]       IMPRESSION AND PLAN:  Zoey Ruiz is a 40 y.o. female with history of lumbar pain. Pt tried physical therapy but notes that she continues to experience lower back pain radiating into the left leg with numbness in the left foot. She tried Neurontin 300 mg 1 tab QAM and 2 tabs QHS with inconsistent relief and states that she has ran out of the medication. 1) Pt was given information on lumbar exercises. 2) She will increase her Neurontin 300 mg to 2 tabs TID, tapering up as directed to better manage her left leg symptoms. Pt was given a Good Rx card. 3) Ms. Chana Romano has a reminder for a \"due or due soon\" health maintenance. I have asked that she contact her primary care provider, Tj Cummings NP, for follow-up on this health maintenance. 4)  demonstrated consistency with prescribing. 5) Pt will follow-up in 3-4 weeks or sooner if needed. 6) Pt defers on any further PT; steroid injections were discussed. HISTORY OF PRESENT ILLNESS:  Zoey Ruiz is a 40 y.o. female with history of lumbar pain. Pt presents to the office today for lumbar MRI follow up. Pt tried physical therapy but notes that she continues to experience lower back pain radiating into the left leg with numbness in the left foot.  She tried Neurontin 300 mg 1 tab QAM and 2 tabs QHS with inconsistent relief. Pt notes that she ran out of the Neurontin since her last office visit but denies any sedation with the medication. She takes ibuprofen 600 mg as needed and states that she does not use muscle relaxants. Pt states that she is not interested in surgical intervention at this time. 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 Pt at this time desires to proceed with medication evaluation. Pain Scale: 5/10    PCP: Rene Fox NP     History reviewed. No pertinent past medical history. Social History     Social History    Marital status:      Spouse name: N/A    Number of children: N/A    Years of education: N/A     Occupational History    Not on file. Social History Main Topics    Smoking status: Never Smoker    Smokeless tobacco: Never Used    Alcohol use No    Drug use: No    Sexual activity: Yes     Partners: Male     Birth control/ protection: None     Other Topics Concern    Caffeine Concern Yes     daily 12oz    Sleep Concern No    Stress Concern No    Weight Concern No    Exercise No    Seat Belt No    Self-Exams Yes     Social History Narrative       Current Outpatient Prescriptions   Medication Sig Dispense Refill    gabapentin (NEURONTIN) 300 mg capsule 2 po tid as directed  Indications: NEUROPATHIC PAIN 180 Cap 3    nabumetone (RELAFEN) 500 mg tablet Take 1 Tab by mouth two (2) times daily (with meals). 60 Tab 1    methocarbamol (ROBAXIN) 500 mg tablet Take 1 Tab by mouth four (4) times daily. 60 Tab 0    methylPREDNISolone (MEDROL DOSEPACK) 4 mg tablet Take as directed 1 Dose Pack 0    acetaminophen (TYLENOL) 325 mg tablet Take  by mouth every four (4) hours as needed for Pain.  ibuprofen (MOTRIN) 600 mg tablet Take 1 Tab by mouth every six (6) hours as needed for Pain.  20 Tab 0    gabapentin (NEURONTIN) 300 mg capsule 1 in the am and 2 in the evening as directed  Indications: NEUROPATHIC PAIN 90 Cap 1    traMADol (ULTRAM) 50 mg tablet Take 1 Tab by mouth every six (6) hours as needed for Pain. Max Daily Amount: 200 mg. 60 Tab 0       No Known Allergies      REVIEW OF SYSTEMS    Constitutional: Negative for fever, chills, or weight change. Respiratory: Negative for cough or shortness of breath. Cardiovascular: Negative for chest pain or palpitations. Gastrointestinal: Negative for acid reflux, change in bowel habits, or constipation. Genitourinary: Negative for dysuria and flank pain. Musculoskeletal: Positive for lumbar pain and left leg weakness. Skin: Negative for rash. Neurological: Positive for numbness in the left foot. Negative for headaches or dizziness. Endo/Heme/Allergies: Negative for increased bruising. Psychiatric/Behavioral: Negative for difficulty with sleep. PHYSICAL EXAMINATION  Visit Vitals    /83    Pulse 71    Temp 97.4 °F (36.3 °C) (Oral)    Resp 17    Ht 5' 7\" (1.702 m)    Wt 287 lb (130.2 kg)    LMP 06/21/2018 (Exact Date)    SpO2 100%    BMI 44.95 kg/m2       Constitutional: Awake, alert, and in no acute distress. Neurological: 1+ symmetrical DTRs in the lower extremities. Sensation to light touch is intact. Skin: warm, dry, and intact. Musculoskeletal: Tenderness to palpation in the lower lumbar region. Moderate pain with extension and axial loading. Pain with forward flexion. No pain with internal or external rotation of her hips. Negative straight leg raise bilaterally.      Hip Flex  Quads Hamstrings Ankle DF EHL Ankle PF   Right +4/5 +4/5 +4/5 +4/5 +4/5 +4/5   Left +4/5 +4/5 +4/5 -4/5 -4/5 -4/5     IMAGING:    Lumbar spine MRI from 07/03/2018 was personally reviewed with the patient and demonstrated:    Results from [place] encounter on 07/03/18   MRI LUMB SPINE WO CONT   Narrative MR Lumbar Spine Without Contrast    HISTORY: Low back pain and left leg pain for one year; reported history of  \"slipped disc\". COMPARISON: Plain films June 2018    Technique: Multi-sequence multiplanar T1, T2, STIR imaging with and without fat  saturation obtained centered on the lumbar spine. FINDINGS:   Alignment: Intact lordosis  Vertebral body height: Normal  Marrow signal: Unremarkable  Disc spaces: Disc desiccation with mild disc space narrowing at L5-S1. Conus: Terminates at L1-2    Axial imaging correlation:    T12-L1: Patent canal and foramina. L1-2: Patent canal and foramina. L2-3: Mild facet hypertrophy. Patent canal and foramina. L3-4: Mild facet arthropathy. Small T2 bright focus lateral left facet  articulation could be a venous varix or small extruding joint cyst. Patent canal  and foramina. L4-5: Mild broad-based disc protrusion. Left foraminal to far lateral annular  tear. Mild facet arthropathy. No significant spinal stenosis. Adequately patent  foramina . L5-S1: Bilobed left paracentral disc extrusion caudad. An underlying broad-based  disc osteophyte complex. There is high signal intensity within the extrusion. There is impingement of the crossing left S1 nerve as on axial T2 images 8-9. Also reference sagittal images 6-8. An overall mild towards moderate spinal  stenosis. Adequately patent foramina. Other structures: Unremarkable. Impression IMPRESSION:    1. Left L5-S1 disc extrusion with impingement of the crossing left S1 nerve.  -A mild to moderate spinal stenosis at this level.  -Other levels of lesser degenerative findings as delineated above. Thank you for this referral.          Left knee x-rays from 05/08/2018 were personally reviewed with the patient and demonstrated:  FINDINGS: No joint effusion appreciated. The alignment of the knee is  unremarkable. No evidence of acute fracture or dislocation. Minimal medial  lateral compartment osteophyte formation are noted. There is no evidence of  erosions or periostitis.  No lytic or blastic focus appreciated. The soft tissues  are unremarkable.      IMPRESSION:  1.   No acute pathology appreciated in the left knee.      2.  Mild osteoarthritis.     Left hip x-rays from 05/08/2018 were personally reviewed with the patient and demonstrated:  FINDINGS:   No evidence of acute fracture of the pelvis. The SI joints demonstrate  subchondral sclerosis and osteophyte formation. The pubic symphysis is  unremarkable.  The femoral heads are well aligned with the osseous pelvis. No  evidence of hip fracture or dislocation.  The left hip demonstrates mild  superolateral joint space narrowing and mild osteophyte formation.      IMPRESSION:  1.  Mild left hip osteoarthritis.      2.  Mild SI joint osteoarthritis.         Written by Ciara Valencia, as dictated by Maddison Kan MD.  I, Dr. Maddison Kan confirm that all documentation is accurate.

## 2018-07-11 ENCOUNTER — TELEPHONE (OUTPATIENT)
Dept: ORTHOPEDIC SURGERY | Age: 38
End: 2018-07-11

## 2018-07-11 DIAGNOSIS — R20.0 NUMBNESS OF TOES: ICD-10-CM

## 2018-07-11 DIAGNOSIS — R29.898 LEFT LEG WEAKNESS: Primary | ICD-10-CM

## 2018-07-11 DIAGNOSIS — M54.42 ACUTE BILATERAL LOW BACK PAIN WITH LEFT-SIDED SCIATICA: ICD-10-CM

## 2018-07-11 DIAGNOSIS — E66.01 OBESITY, MORBID (HCC): ICD-10-CM

## 2018-07-11 DIAGNOSIS — M51.36 DDD (DEGENERATIVE DISC DISEASE), LUMBAR: ICD-10-CM

## 2018-07-11 NOTE — TELEPHONE ENCOUNTER
Patient is calling back checking on the Work Accomodation Form her HR Dept faxed over yesterday to   Dr. Harsha Gutierrez. It is not a disability form. Work form to show patients ability to due her job with any and all restrictions. Patient's HR Dept needs this asap to know patients work ability status.   Please check on the form and call patient back asap at [phone]

## 2018-07-11 NOTE — TELEPHONE ENCOUNTER
Patient called again and is requesting and order for a regular walking cane from . Patient would like the order faxed to [facility] at  Fax# [phone]. Patient tel. [phone].

## 2018-07-12 ENCOUNTER — DOCUMENTATION ONLY (OUTPATIENT)
Dept: ORTHOPEDIC SURGERY | Age: 38
End: 2018-07-12

## 2018-07-12 PROBLEM — R29.898 LEFT LEG WEAKNESS: Status: ACTIVE | Noted: 2018-07-12

## 2018-07-12 NOTE — TELEPHONE ENCOUNTER
Please discuss with Dr Lalo Reeves. KELVIN, when I saw the pt last she did not require the use of a cane.

## 2018-07-12 NOTE — PROGRESS NOTES
Physician statement form was received from [facility], instructed patient that we will call when completed, may take 7-10 business days.

## 2018-07-12 NOTE — PROGRESS NOTES
Spoke with pt to pay for form that was received states she does not have any money to pay. .. Sent form to scan until further action is needed. . If pt calls to pay form can be printed from chart

## 2018-07-12 NOTE — TELEPHONE ENCOUNTER
Patient called stating she needs to speak to Dr. Lopez. When asked why she said something about her forms that are supposed to be filled out and how \"this is just crazy so I just need to speak to her. \" No other information was provided. Please advise patient back regarding this at [phone].

## 2018-07-13 NOTE — TELEPHONE ENCOUNTER
Per Dr. Caridad Pastrana, ok to order DME for single point cane. However, in regards to patient's forms, Dr. Caridad Pastrana cannot lie and state patient is disabled. Per Dr. Caridad aPstrana clarify what patient is needing in regards to her forms. Returned call to patient, verified , informed patient of above. Per patient, she would like for DME order to be faxed to below medical supply, however, she is no longer pursuing forms with our office, as she cannot afford the cost, and she is no longer working. Informed patient, we cannot fax order, however we can mail to address on file. Patient verified address, DME mailed. No further action required at this time.

## 2018-07-17 ENCOUNTER — DOCUMENTATION ONLY (OUTPATIENT)
Dept: ORTHOPEDIC SURGERY | Age: 38
End: 2018-07-17

## 2018-07-31 ENCOUNTER — OFFICE VISIT (OUTPATIENT)
Dept: ORTHOPEDIC SURGERY | Age: 38
End: 2018-07-31

## 2018-07-31 VITALS
BODY MASS INDEX: 45.99 KG/M2 | SYSTOLIC BLOOD PRESSURE: 109 MMHG | DIASTOLIC BLOOD PRESSURE: 69 MMHG | OXYGEN SATURATION: 100 % | HEIGHT: 67 IN | WEIGHT: 293 LBS | HEART RATE: 69 BPM | RESPIRATION RATE: 15 BRPM

## 2018-07-31 DIAGNOSIS — M51.26 HNP (HERNIATED NUCLEUS PULPOSUS), LUMBAR: Primary | ICD-10-CM

## 2018-07-31 DIAGNOSIS — M54.16 LUMBAR RADICULAR PAIN: ICD-10-CM

## 2018-07-31 RX ORDER — PREDNISONE 10 MG/1
TABLET ORAL
Qty: 11 TAB | Refills: 0 | Status: SHIPPED | OUTPATIENT
Start: 2018-07-31 | End: 2019-04-25

## 2018-07-31 NOTE — PATIENT INSTRUCTIONS
Herniated Disc: Exercises  Your Care Instructions  Here are some examples of typical rehabilitation exercises for your condition. Start each exercise slowly. Ease off the exercise if you start to have pain. Your doctor or physical therapist will tell you when you can start these exercises and which ones will work best for you. How to stay safe  These exercises can help you move easier and feel better. But when you first start doing them, you may have more pain in your back. This is normal. But it is important to pay close attention to your pain during and after each exercise. · Keep doing these exercises if your pain stays the same or moves from your leg and buttock more toward the middle of your spine. Pain moving out of your leg and buttock is a good sign. · Stop doing these exercises if your pain gets worse in your leg and buttock. Stop if you start to have pain in your leg and buttock that you didn't have before. Be sure to do these exercises in the order they appear. Note how your pain changes before you move to the next one. If your pain is much worse right after exercise and stays worse the next day, do not do any of these exercises. How to do the exercises  1. Rest on belly    1. Lie on your stomach, face down, with your head turned to the side. Place your arms beside your body. If this bothers your neck, place your hands, one on top of the other, underneath your forehead. This will help support your head and neck. 2. Try to relax your lower back muscles as much as you can. 3. Continue to lie on your stomach for 2 minutes. 4. If your pain spreads down your leg or increases down your leg, stop this exercise and do not do the next exercises. 2. Press-up    1. Lie on your stomach, face down. Keep your elbows tucked into your sides and under your shoulders. 2. Press your elbows down into the floor to raise your upper back. As you do this, relax your stomach muscles.  Allow your back to arch without using your back muscles. Let your low back relax completely as you arch up. 3. Hold this position for 2 minutes. 4. Repeat 2 to 4 times. 5. If your pain spreads down your leg or increases down your leg, stop this exercise and do not do the next exercises. 3. Full press-up    1. Lie on your stomach, face down. Keep your elbows tucked into your sides and under your shoulders. 2. Straighten your elbows, and push your upper body up as far as you can. Allow your lower back to sag. Keep your hips, pelvis, and legs relaxed. 3. Hold this position for 5 seconds, and then relax. 4. Repeat 10 times. Each time, try to raise your upper body a little higher and hold your arms a bit straighter. 5. If your pain spreads down your leg or gets worse down your leg, stop this exercise and do not move to the next exercise. 6. If you can't do this exercise, you may instead try the backward bend exercise that follows. 4. Backward bend    1. Stand with your feet hip-width apart. Your toes should point forward. Do not lock your knees. 2. Place your hands in the small of your back. 3. Bend backward as far as you can, keeping your knees straight. Hold this position for 2 to 3 seconds. Then return to your starting position. 4. Repeat 2 to 4 times. Each time, try to bend backward a little farther, until you bend backward as far as you can. 5. If your pain spreads down your leg or increases down your leg, stop this exercise. Follow-up care is a key part of your treatment and safety. Be sure to make and go to all appointments, and call your doctor if you are having problems. It's also a good idea to know your test results and keep a list of the medicines you take. Where can you learn more? Go to http://suzan-kinga.info/. Enter 76206 [id number] [phone] in the search box to learn more about \"Herniated Disc: Exercises. \"  Current as of: November 29, 2017  Content Version: 11.7  © 0058-6428 [employer] [facility].  Care instructions adapted under license by [employer] (which disclaims liability or warranty for this information). If you have questions about a medical condition or this instruction, always ask your healthcare professional. [address] any warranty or liability for your use of this information.

## 2018-07-31 NOTE — MR AVS SNAPSHOT
[address] [place] [address] [address] [facility] [address] 
[phone] Patient: Dean Hyde MRN: [id number] :1980 Visit Information Date & Time Provider Department Dept. Phone Encounter #  
 2018  8:50 AM David Crews MD  [facility] [phone] [id number] Follow-up Instructions Return in about 3 months (around 10/31/2018) for Medication follow up. Upcoming Health Maintenance Date Due DTaP/Tdap/Td series (1 - Tdap) 10/10/2001 PAP AKA CERVICAL CYTOLOGY 2018 Influenza Age 5 to Adult 10/11/2018* *Topic was postponed. The date shown is not the original due date. Allergies as of 2018  Review Complete On: 2018 By: David Crews MD  
 No Known Allergies Current Immunizations  Never Reviewed No immunizations on file. Not reviewed this visit You Were Diagnosed With   
  
 Codes Comments HNP (herniated nucleus pulposus), lumbar    -  Primary ICD-10-CM: M51.26 
ICD-9-CM: 722.10 Lumbar radicular pain     ICD-10-CM: M54.16 
ICD-9-CM: 724.4 Vitals BP Pulse Resp Height(growth percentile) Weight(growth percentile) SpO2  
 109/69 69 15 5' 7\" (1.702 m) 293 lb (132.9 kg) 100% BMI OB Status Smoking Status 45.89 kg/m2 Having regular periods Never Smoker BMI and BSA Data Body Mass Index Body Surface Area 45.89 kg/m 2 2.51 m 2 Preferred Pharmacy Pharmacy Name Phone [facility] [address] [place] [address]. [phone] Your Updated Medication List  
  
   
This list is accurate as of 18 10:07 AM.  Always use your most recent med list.  
  
  
  
  
 * gabapentin 300 mg capsule Commonly known as:  NEURONTIN  
1 in the am and 2 in the evening as directed  Indications: NEUROPATHIC PAIN  
  
 * gabapentin 300 mg capsule Commonly known as:  NEURONTIN  
 2 po tid as directed  Indications: NEUROPATHIC PAIN  
  
 ibuprofen 600 mg tablet Commonly known as:  MOTRIN Take 1 Tab by mouth every six (6) hours as needed for Pain. methocarbamol 500 mg tablet Commonly known as:  ROBAXIN Take 1 Tab by mouth four (4) times daily. methylPREDNISolone 4 mg tablet Commonly known as:  Jabari Gull Take as directed  
  
 nabumetone 500 mg tablet Commonly known as:  RELAFEN Take 1 Tab by mouth two (2) times daily (with meals). predniSONE 10 mg tablet Commonly known as:  DELTASONE  
2 pills in am for 3 days, then 1 pill in am for 3 days and 1/2 pill in am for remainder  
  
 traMADol 50 mg tablet Commonly known as:  ULTRAM  
Take 1 Tab by mouth every six (6) hours as needed for Pain. Max Daily Amount: 200 mg.  
  
 TYLENOL 325 mg tablet Generic drug:  acetaminophen Take  by mouth every four (4) hours as needed for Pain. * Notice: This list has 2 medication(s) that are the same as other medications prescribed for you. Read the directions carefully, and ask your doctor or other care provider to review them with you. Prescriptions Printed Refills  
 predniSONE (DELTASONE) 10 mg tablet 0 Si pills in am for 3 days, then 1 pill in am for 3 days and 1/2 pill in am for remainder Class: Print Follow-up Instructions Return in about 3 months (around 10/31/2018) for Medication follow up. Patient Instructions Herniated Disc: Exercises Your Care Instructions Here are some examples of typical rehabilitation exercises for your condition. Start each exercise slowly. Ease off the exercise if you start to have pain. Your doctor or physical therapist will tell you when you can start these exercises and which ones will work best for you. How to stay safe These exercises can help you move easier and feel better. But when you first start doing them, you may have more pain in your back.  This is normal. But it is important to pay close attention to your pain during and after each exercise. · Keep doing these exercises if your pain stays the same or moves from your leg and buttock more toward the middle of your spine. Pain moving out of your leg and buttock is a good sign. · Stop doing these exercises if your pain gets worse in your leg and buttock. Stop if you start to have pain in your leg and buttock that you didn't have before. Be sure to do these exercises in the order they appear. Note how your pain changes before you move to the next one. If your pain is much worse right after exercise and stays worse the next day, do not do any of these exercises. How to do the exercises 1. Rest on belly 1. Lie on your stomach, face down, with your head turned to the side. Place your arms beside your body. If this bothers your neck, place your hands, one on top of the other, underneath your forehead. This will help support your head and neck. 2. Try to relax your lower back muscles as much as you can. 3. Continue to lie on your stomach for 2 minutes. 4. If your pain spreads down your leg or increases down your leg, stop this exercise and do not do the next exercises. 2. Press-up 1. Lie on your stomach, face down. Keep your elbows tucked into your sides and under your shoulders. 2. Press your elbows down into the floor to raise your upper back. As you do this, relax your stomach muscles. Allow your back to arch without using your back muscles. Let your low back relax completely as you arch up. 3. Hold this position for 2 minutes. 4. Repeat 2 to 4 times. 5. If your pain spreads down your leg or increases down your leg, stop this exercise and do not do the next exercises. 3. Full press-up 1. Lie on your stomach, face down. Keep your elbows tucked into your sides and under your shoulders. 2. Straighten your elbows, and push your upper body up as far as you can. Allow your lower back to sag. Keep your hips, pelvis, and legs relaxed. 3. Hold this position for 5 seconds, and then relax. 4. Repeat 10 times. Each time, try to raise your upper body a little higher and hold your arms a bit straighter. 5. If your pain spreads down your leg or gets worse down your leg, stop this exercise and do not move to the next exercise. 6. If you can't do this exercise, you may instead try the backward bend exercise that follows. 4. Backward bend 1. Stand with your feet hip-width apart. Your toes should point forward. Do not lock your knees. 2. Place your hands in the small of your back. 3. Bend backward as far as you can, keeping your knees straight. Hold this position for 2 to 3 seconds. Then return to your starting position. 4. Repeat 2 to 4 times. Each time, try to bend backward a little farther, until you bend backward as far as you can. 5. If your pain spreads down your leg or increases down your leg, stop this exercise. Follow-up care is a key part of your treatment and safety. Be sure to make and go to all appointments, and call your doctor if you are having problems. It's also a good idea to know your test results and keep a list of the medicines you take. Where can you learn more? Go to http://suzan-kinga.info/. Enter [id number] [phone] in the search box to learn more about \"Herniated Disc: Exercises. \" Current as of: November 29, 2017 Content Version: 11.7 © 0388-0628 [employer]. Care instructions adapted under license by [employer] (which disclaims liability or warranty for this information). If you have questions about a medical condition or this instruction, always ask your healthcare professional. [place] [address] any warranty or liability for your use of this information. Introducing \[facility] [place] [facility]\"" & HEALTH SERVICES!    
 Jeremi Smith introduces [employer] patient portal. Now you can access parts of your medical record, email your doctor's office, and request medication refills online. 1. In your internet browser, go to [email or web address] 2. Click on the First Time User? Click Here link in the Sign In box. You will see the New Member Sign Up page. 3. Enter your ColdSpark Access Code exactly as it appears below. You will not need to use this code after youve completed the sign-up process. If you do not sign up before the expiration date, you must request a new code. · ColdSpark Access Code: [email or web address] Expires: 9/27/2018  9:54 AM 
 
4. Enter the last four digits of your Social Security Number (xxxx) and Date of Birth (mm/dd/yyyy) as indicated and click Submit. You will be taken to the next sign-up page. 5. Create a ColdSpark ID. This will be your ColdSpark login ID and cannot be changed, so think of one that is secure and easy to remember. 6. Create a ColdSpark password. You can change your password at any time. 7. Enter your Password Reset Question and Answer. This can be used at a later time if you forget your password. 8. Enter your e-mail address. You will receive e-mail notification when new information is available in [address] 9. Click Sign Up. You can now view and download portions of your medical record. 10. Click the Download Summary menu link to download a portable copy of your medical information. If you have questions, please visit the Frequently Asked Questions section of the ColdSpark website. Remember, ColdSpark is NOT to be used for urgent needs. For medical emergencies, dial 911. Now available from your iPhone and Android! Please provide this summary of care documentation to your next provider. Your primary care clinician is listed as Meri Emerson. If you have any questions after today's visit, please call [phone].

## 2018-07-31 NOTE — PROGRESS NOTES
[facility]  [address] [place], [facility] [address]  Phone: [phone]  Fax: [phone]    Pt's YOB: 1980    ASSESSMENT   Diagnoses and all orders for this visit:    1. HNP (herniated nucleus pulposus), lumbar  -     predniSONE (DELTASONE) 10 mg tablet; 2 pills in am for 3 days, then 1 pill in am for 3 days and 1/2 pill in am for remainder    2. Lumbar radicular pain         IMPRESSION AND PLAN:  Yoli Gregory is a 40 y.o.  female with history of lumbar pain. Pt continues to experience pain radiating down the left leg with numbness in the left foot. She notes that she did not take and/or increase her Neurontin 300 mg since her last office visit because she could not afford the medication. 1) Pt was given information on herniated disc exercises. 2) She was informed of proper lifting mechanics. 3) Pt was prescribed small prednisone taper. 4) Ms. Monica Moralez has a reminder for a \"due or due soon\" health maintenance. I have asked that she contact her primary care provider, Troy Guerra NP, for follow-up on this health maintenance. 5)  demonstrated consistency with prescribing. 6) Pt will follow-up in 3 months. 7) Pt defers on any other treatment at this time. HISTORY OF PRESENT ILLNESS:  Yoli Gregory is a 40 y.o.  female with history of lumbar pain. Pt denies any change in her symptoms since her last office visit and continues to experience pain radiating down the left leg with numbness in the left foot. She notes that she did not take and/or increase her Neurontin 300 mg since her last office visit because she could not afford the medication. Pt notes that she quit her job since her last office visit. She takes ibuprofen as needed and has never tried muscle relaxants. Of note, patient is not diabetic. Pt is not particularly interested in surgical options at this time.  Pt at this time desires to proceed with medication evaluation. Pain Scale: 6/10    PCP: Courtney Franks NP     History reviewed. No pertinent past medical history. Social History     Social History    Marital status:      Spouse name: N/A    Number of children: N/A    Years of education: N/A     Occupational History    Not on file. Social History Main Topics    Smoking status: Never Smoker    Smokeless tobacco: Never Used    Alcohol use No    Drug use: No    Sexual activity: Yes     Partners: Male     Birth control/ protection: None     Other Topics Concern    Caffeine Concern Yes     daily 12oz    Sleep Concern No    Stress Concern No    Weight Concern No    Exercise No    Seat Belt No    Self-Exams Yes     Social History Narrative       Current Outpatient Prescriptions   Medication Sig Dispense Refill    predniSONE (DELTASONE) 10 mg tablet 2 pills in am for 3 days, then 1 pill in am for 3 days and 1/2 pill in am for remainder 11 Tab 0    nabumetone (RELAFEN) 500 mg tablet Take 1 Tab by mouth two (2) times daily (with meals). 60 Tab 1    methocarbamol (ROBAXIN) 500 mg tablet Take 1 Tab by mouth four (4) times daily. 60 Tab 0    methylPREDNISolone (MEDROL DOSEPACK) 4 mg tablet Take as directed 1 Dose Pack 0    acetaminophen (TYLENOL) 325 mg tablet Take  by mouth every four (4) hours as needed for Pain.  ibuprofen (MOTRIN) 600 mg tablet Take 1 Tab by mouth every six (6) hours as needed for Pain. 20 Tab 0    traMADol (ULTRAM) 50 mg tablet Take 1 Tab by mouth every six (6) hours as needed for Pain. Max Daily Amount: 200 mg. 60 Tab 0    gabapentin (NEURONTIN) 300 mg capsule 2 po tid as directed  Indications: NEUROPATHIC PAIN 180 Cap 3    gabapentin (NEURONTIN) 300 mg capsule 1 in the am and 2 in the evening as directed  Indications: NEUROPATHIC PAIN 90 Cap 1       No Known Allergies      REVIEW OF SYSTEMS    Constitutional: Negative for fever, chills, or weight change.    Respiratory: Negative for cough or shortness of breath. Cardiovascular: Negative for chest pain or palpitations. Gastrointestinal: Negative for acid reflux, change in bowel habits, or constipation. Genitourinary: Negative for dysuria and flank pain. Musculoskeletal: Positive for lumbar pain. Skin: Negative for rash. Neurological: Negative for headaches, dizziness, or numbness. Endo/Heme/Allergies: Negative for increased bruising. Psychiatric/Behavioral: Negative for difficulty with sleep. PHYSICAL EXAMINATION  Visit Vitals    /69    Pulse 69    Resp 15    Ht 5' 7\" (1.702 m)    Wt 293 lb (132.9 kg)    SpO2 100%    BMI 45.89 kg/m2       Constitutional: Awake, alert, and in no acute distress. Neurological: 1+ symmetrical DTRs in the lower extremities. Sensation to light touch is intact. Skin: warm, dry, and intact. Musculoskeletal: Tenderness to palpation in the lower lumbar region. Moderate pain with extension and axial loading. Pain with forward flexion. No pain with internal or external rotation of her hips. Negative straight leg raise bilaterally. Hip Flex  Quads Hamstrings Ankle DF EHL Ankle PF   Right +4/5 +4/5 +4/5 +4/5 +4/5 +4/5   Left +4/5 +4/5 +4/5 +4/5 -4/5 +4/5     IMAGING:    Lumbar spine MRI from 07/03/2018 was personally reviewed with the patient and demonstrated:  Results from [facility] on 07/03/18   MRI LUMB SPINE WO CONT    Narrative MR Lumbar Spine Without Contrast    HISTORY: Low back pain and left leg pain for one year; reported history of  \"slipped disc\". COMPARISON: Plain films June 2018    Technique: Multi-sequence multiplanar T1, T2, STIR imaging with and without fat  saturation obtained centered on the lumbar spine. FINDINGS:   Alignment: Intact lordosis  Vertebral body height: Normal  Marrow signal: Unremarkable  Disc spaces: Disc desiccation with mild disc space narrowing at L5-S1.   Conus: Terminates at L1-2    Axial imaging correlation:    T12-L1: Patent canal and foramina. L1-2: Patent canal and foramina. L2-3: Mild facet hypertrophy. Patent canal and foramina. L3-4: Mild facet arthropathy. Small T2 bright focus lateral left facet  articulation could be a venous varix or small extruding joint cyst. Patent canal  and foramina. L4-5: Mild broad-based disc protrusion. Left foraminal to far lateral annular  tear. Mild facet arthropathy. No significant spinal stenosis. Adequately patent  foramina . L5-S1: Bilobed left paracentral disc extrusion caudad. An underlying broad-based  disc osteophyte complex. There is high signal intensity within the extrusion. There is impingement of the crossing left S1 nerve as on axial T2 images 8-9. Also reference sagittal images 6-8. An overall mild towards moderate spinal  stenosis. Adequately patent foramina. Other structures: Unremarkable.             Impression IMPRESSION:    1. Left L5-S1 disc extrusion with impingement of the crossing left S1 nerve.  -A mild to moderate spinal stenosis at this level.  -Other levels of lesser degenerative findings as delineated above. Thank you for this referral.            Left knee x-rays from 05/08/2018 were personally reviewed and demonstrated:  FINDINGS: No joint effusion appreciated. The alignment of the knee is  unremarkable. No evidence of acute fracture or dislocation. Minimal medial  lateral compartment osteophyte formation are noted. There is no evidence of  erosions or periostitis. No lytic or blastic focus appreciated. The soft tissues  are unremarkable.      IMPRESSION:  1.   No acute pathology appreciated in the left knee. 2.  Mild osteoarthritis.      Left hip x-rays from 05/08/2018 were personally reviewed with the patient and demonstrated:  FINDINGS:   No evidence of acute fracture of the pelvis. The SI joints demonstrate  subchondral sclerosis and osteophyte formation. The pubic symphysis is  unremarkable.  The femoral heads are well aligned with the osseous pelvis. No  evidence of hip fracture or dislocation.  The left hip demonstrates mild  superolateral joint space narrowing and mild osteophyte formation.      IMPRESSION:  1.  Mild left hip osteoarthritis. 2.  Mild SI joint osteoarthritis.      Written by Yancy Tinsley, as dictated by Amisha Reich MD.  I, Dr. Amisha Reich confirm that all documentation is accurate.

## 2018-08-14 ENCOUNTER — DOCUMENTATION ONLY (OUTPATIENT)
Dept: ORTHOPEDIC SURGERY | Age: 38
End: 2018-08-14

## 2018-08-14 NOTE — PROGRESS NOTES
Disability form was received from [address] [place] [address]. , instructed patient that we will call when completed, may take 7-10 business days.

## 2018-08-21 ENCOUNTER — DOCUMENTATION ONLY (OUTPATIENT)
Dept: ORTHOPEDIC SURGERY | Age: 38
End: 2018-08-21

## 2018-09-02 NOTE — PROGRESS NOTES
In Nadine Xavierzenaida [address], [address] [place], [address] [place] [address],[facility] 300 
[phone] fax Discharge Summary Patient name: Ruth Luna Start of Care: 2018 Referral source: Cheko Artis MD : [id number] Medical Diagnosis: Pain in left knee [M25.562] Pain in left hip [M25.552] Low back pain [M54.5] Onset Date:18 Treatment Diagnosis: Pelvic obliquity, Neural tension Left LE In the sciatic nerve pattern Prior Hospitalization: see medical history Provider#: [id number] Medications: Verified on Patient summary List  
 Comorbidities: OA 
 Prior Level of Function: Back pain for 1 year but able to perform usual work duties and able to walk ups stairs normally Visits from Start of Care: 9    Missed Visits: 7 Reporting Period : 18 to 18 Summary of Care: Good overall progress however, patient did not return for final assessment. Pain as of last visit was 0/10 with patient reports of left leg pain when standing aor walking for long periods of time. Progress towards goals / Updated goals: 
Short Term Goals: To be accomplished in 5 treatments: 1.  Pt will be compliant and independent with HEP in order to facilitate PT sessions and aid with self management 
                      Eval Status:  Initiated 
                      YSUNJSC Status: progressing 5/25/18   6/15/18 2.  Pt to tolerate 30 min or more of TE and/or Interventions w/o increased s/s                       Eval Status:  Initiated 
                      NPDTMXH HXRFSQ:       79 6/15/18    , 29 18            
Long Term Goals: To be accomplished in 10 treatments: 1.  Pt will report 50% improvement or better with involvement to show a significant increase in function                       Eval Status:  Initiated 
                      Current Status: Significantly improved pain level, however goal not assessed 2.  Pt will ambulate 500' w/o left antalgic gait for progress to usual community ambulation w/o difficulty  
                      Eval Status:  Initiated 
                      Current Status: Not assessed, failed to return to complete program 
3.  Pt will demonstrate the ability to perform (B) SLR to 70* or better to show increased strength, function and decreased neural tension Left LE    
                      Eval Status:  Initiated 
                      Current Status: Not assessed, failed to return to complete program 
4.  Pt will demonstrate the ability to walk 2 flights of steps with a reciprocal gait patter with little discomfort to be able to walk up her steps at the end of the day w/o discomfort to prepare for bed    
                      Eval Status:  Initiated 
                      MRAOCOD Status:Not assessed, failed to return to complete program 
5.  Pt will improve knee FOTO score to 53 in 14 visits  to show significant improvement in function for progress to independent community ambulation and return to work 
                      Eval Status: 30 
                                  Current Not met 40 6/8/18 ASSESSMENT/RECOMMENDATIONS: 
[]Discontinue therapy progressing towards or have reached established goals []Discontinue therapy due to lack of appreciable progress towards goals [x]Discontinue therapy due to lack of attendance or compliance [x]Other: DC with good progress, final visit not performed Thank you for this referral.  
 
Allie Banuelos, PT 9/2/2018 3:13 PM

## 2019-04-25 ENCOUNTER — APPOINTMENT (OUTPATIENT)
Dept: VASCULAR SURGERY | Age: 39
End: 2019-04-25
Attending: EMERGENCY MEDICINE
Payer: MEDICAID

## 2019-04-25 ENCOUNTER — APPOINTMENT (OUTPATIENT)
Dept: GENERAL RADIOLOGY | Age: 39
End: 2019-04-25
Attending: EMERGENCY MEDICINE
Payer: MEDICAID

## 2019-04-25 ENCOUNTER — HOSPITAL ENCOUNTER (EMERGENCY)
Age: 39
Discharge: HOME | End: 2019-04-25
Attending: EMERGENCY MEDICINE
Payer: MEDICAID

## 2019-04-25 VITALS
TEMPERATURE: 98.3 F | OXYGEN SATURATION: 100 % | DIASTOLIC BLOOD PRESSURE: 99 MMHG | HEART RATE: 67 BPM | RESPIRATION RATE: 18 BRPM | SYSTOLIC BLOOD PRESSURE: 131 MMHG

## 2019-04-25 DIAGNOSIS — R60.0 PEDAL EDEMA: Primary | ICD-10-CM

## 2019-04-25 DIAGNOSIS — L03.119 CELLULITIS OF LOWER EXTREMITY, UNSPECIFIED LATERALITY: ICD-10-CM

## 2019-04-25 DIAGNOSIS — R03.0 ELEVATED BLOOD PRESSURE READING: ICD-10-CM

## 2019-04-25 LAB
ALBUMIN SERPL-MCNC: 3.8 G/DL (ref 3.4–5)
ALBUMIN/GLOB SERPL: 1 {RATIO} (ref 0.8–1.7)
ALP SERPL-CCNC: 73 U/L (ref 45–117)
ALT SERPL-CCNC: 29 U/L (ref 13–56)
ANION GAP SERPL CALC-SCNC: 4 MMOL/L (ref 3–18)
APPEARANCE UR: CLEAR
AST SERPL-CCNC: 16 U/L (ref 15–37)
ATRIAL RATE: 63 BPM
BASOPHILS # BLD: 0 K/UL (ref 0–0.1)
BASOPHILS NFR BLD: 0 % (ref 0–2)
BILIRUB SERPL-MCNC: 0.3 MG/DL (ref 0.2–1)
BILIRUB UR QL: NEGATIVE
BNP SERPL-MCNC: 29 PG/ML (ref 0–450)
BUN SERPL-MCNC: 7 MG/DL (ref 7–18)
BUN/CREAT SERPL: 10 (ref 12–20)
CALCIUM SERPL-MCNC: 9 MG/DL (ref 8.5–10.1)
CALCULATED P AXIS, ECG09: 56 DEGREES
CALCULATED R AXIS, ECG10: 55 DEGREES
CALCULATED T AXIS, ECG11: 57 DEGREES
CHLORIDE SERPL-SCNC: 108 MMOL/L (ref 100–108)
CO2 SERPL-SCNC: 28 MMOL/L (ref 21–32)
COLOR UR: YELLOW
CREAT SERPL-MCNC: 0.72 MG/DL (ref 0.6–1.3)
D DIMER PPP FEU-MCNC: 1.13 UG/ML(FEU)
DIAGNOSIS, 93000: NORMAL
DIFFERENTIAL METHOD BLD: ABNORMAL
EOSINOPHIL # BLD: 0.2 K/UL (ref 0–0.4)
EOSINOPHIL NFR BLD: 5 % (ref 0–5)
ERYTHROCYTE [DISTWIDTH] IN BLOOD BY AUTOMATED COUNT: 14.2 % (ref 11.6–14.5)
GLOBULIN SER CALC-MCNC: 3.7 G/DL (ref 2–4)
GLUCOSE SERPL-MCNC: 78 MG/DL (ref 74–99)
GLUCOSE UR STRIP.AUTO-MCNC: NEGATIVE MG/DL
HCT VFR BLD AUTO: 35.7 % (ref 35–45)
HGB BLD-MCNC: 11.6 G/DL (ref 12–16)
HGB UR QL STRIP: NEGATIVE
KETONES UR QL STRIP.AUTO: NEGATIVE MG/DL
LEUKOCYTE ESTERASE UR QL STRIP.AUTO: NEGATIVE
LYMPHOCYTES # BLD: 1.9 K/UL (ref 0.9–3.6)
LYMPHOCYTES NFR BLD: 42 % (ref 21–52)
MCH RBC QN AUTO: 24.6 PG (ref 24–34)
MCHC RBC AUTO-ENTMCNC: 32.5 G/DL (ref 31–37)
MCV RBC AUTO: 75.6 FL (ref 74–97)
MONOCYTES # BLD: 0.3 K/UL (ref 0.05–1.2)
MONOCYTES NFR BLD: 6 % (ref 3–10)
NEUTS SEG # BLD: 2.1 K/UL (ref 1.8–8)
NEUTS SEG NFR BLD: 47 % (ref 40–73)
NITRITE UR QL STRIP.AUTO: NEGATIVE
P-R INTERVAL, ECG05: 164 MS
PH UR STRIP: 5.5 [PH] (ref 5–8)
PLATELET # BLD AUTO: 350 K/UL (ref 135–420)
PMV BLD AUTO: 9.5 FL (ref 9.2–11.8)
POTASSIUM SERPL-SCNC: 3.9 MMOL/L (ref 3.5–5.5)
PROT SERPL-MCNC: 7.5 G/DL (ref 6.4–8.2)
PROT UR STRIP-MCNC: NEGATIVE MG/DL
Q-T INTERVAL, ECG07: 426 MS
QRS DURATION, ECG06: 88 MS
QTC CALCULATION (BEZET), ECG08: 435 MS
RBC # BLD AUTO: 4.72 M/UL (ref 4.2–5.3)
SODIUM SERPL-SCNC: 140 MMOL/L (ref 136–145)
SP GR UR REFRACTOMETRY: 1.02 (ref 1–1.03)
TROPONIN I SERPL-MCNC: <0.02 NG/ML (ref 0–0.04)
UROBILINOGEN UR QL STRIP.AUTO: 0.2 EU/DL (ref 0.2–1)
VENTRICULAR RATE, ECG03: 63 BPM
WBC # BLD AUTO: 4.4 K/UL (ref 4.6–13.2)

## 2019-04-25 PROCEDURE — 93970 EXTREMITY STUDY: CPT

## 2019-04-25 PROCEDURE — 93005 ELECTROCARDIOGRAM TRACING: CPT

## 2019-04-25 PROCEDURE — 81003 URINALYSIS AUTO W/O SCOPE: CPT

## 2019-04-25 PROCEDURE — 74011250636 HC RX REV CODE- 250/636: Performed by: EMERGENCY MEDICINE

## 2019-04-25 PROCEDURE — 84484 ASSAY OF TROPONIN QUANT: CPT

## 2019-04-25 PROCEDURE — 85025 COMPLETE CBC W/AUTO DIFF WBC: CPT

## 2019-04-25 PROCEDURE — 83880 ASSAY OF NATRIURETIC PEPTIDE: CPT

## 2019-04-25 PROCEDURE — 80053 COMPREHEN METABOLIC PANEL: CPT

## 2019-04-25 PROCEDURE — 71046 X-RAY EXAM CHEST 2 VIEWS: CPT

## 2019-04-25 PROCEDURE — 99284 EMERGENCY DEPT VISIT MOD MDM: CPT

## 2019-04-25 PROCEDURE — 96374 THER/PROPH/DIAG INJ IV PUSH: CPT

## 2019-04-25 PROCEDURE — 85379 FIBRIN DEGRADATION QUANT: CPT

## 2019-04-25 RX ORDER — CEPHALEXIN 500 MG/1
500 CAPSULE ORAL 3 TIMES DAILY
Qty: 21 CAP | Refills: 0 | Status: SHIPPED | OUTPATIENT
Start: 2019-04-25 | End: 2019-05-02

## 2019-04-25 RX ORDER — FUROSEMIDE 20 MG/1
20 TABLET ORAL DAILY
Qty: 7 TAB | Refills: 0 | Status: SHIPPED | OUTPATIENT
Start: 2019-04-25

## 2019-04-25 RX ORDER — FUROSEMIDE 10 MG/ML
20 INJECTION INTRAMUSCULAR; INTRAVENOUS
Status: COMPLETED | OUTPATIENT
Start: 2019-04-25 | End: 2019-04-25

## 2019-04-25 RX ORDER — FUROSEMIDE 10 MG/ML
40 INJECTION INTRAMUSCULAR; INTRAVENOUS
Status: DISCONTINUED | OUTPATIENT
Start: 2019-04-25 | End: 2019-04-25

## 2019-04-25 RX ADMIN — FUROSEMIDE 20 MG: 10 INJECTION, SOLUTION INTRAVENOUS at 14:43

## 2019-04-25 NOTE — DISCHARGE INSTRUCTIONS
Elevated Blood Pressure: Care Instructions  Your Care Instructions    Blood pressure is a measure of how hard the blood pushes against the walls of your arteries. It's normal for blood pressure to go up and down throughout the day. But if it stays up over time, you have high blood pressure. Two numbers tell you your blood pressure. The first number is the systolic pressure. It shows how hard the blood pushes when your heart is pumping. The second number is the diastolic pressure. It shows how hard the blood pushes between heartbeats, when your heart is relaxed and filling with blood. An ideal blood pressure in adults is less than 120/80 (say \"120 over 80\"). High blood pressure is 140/90 or higher. You have high blood pressure if your top number is 140 or higher or your bottom number is 90 or higher, or both. The main test for high blood pressure is simple, fast, and painless. To diagnose high blood pressure, your doctor will test your blood pressure at different times. After testing your blood pressure, your doctor may ask you to test it again when you are home. If you are diagnosed with high blood pressure, you can work with your doctor to make a long-term plan to manage it. Follow-up care is a key part of your treatment and safety. Be sure to make and go to all appointments, and call your doctor if you are having problems. It's also a good idea to know your test results and keep a list of the medicines you take. How can you care for yourself at home? · Do not smoke. Smoking increases your risk for heart attack and stroke. If you need help quitting, talk to your doctor about stop-smoking programs and medicines. These can increase your chances of quitting for good. · Stay at a healthy weight. · Try to limit how much sodium you eat to less than 2,300 milligrams (mg) a day. Your doctor may ask you to try to eat less than 1,500 mg a day. · Be physically active.  Get at least 30 minutes of exercise on most days of the week. Walking is a good choice. You also may want to do other activities, such as running, swimming, cycling, or playing tennis or team sports. · Avoid or limit alcohol. Talk to your doctor about whether you can drink any alcohol. · Eat plenty of fruits, vegetables, and low-fat dairy products. Eat less saturated and total fats. · Learn how to check your blood pressure at home. When should you call for help? Call your doctor now or seek immediate medical care if:  ? · Your blood pressure is much higher than normal (such as 180/110 or higher). ? · You think high blood pressure is causing symptoms such as:  ¨ Severe headache. ¨ Blurry vision. ? Watch closely for changes in your health, and be sure to contact your doctor if:  ? · You do not get better as expected. Where can you learn more? Go to http://[employer] Networkkinga.info/. Enter K860 in the search box to learn more about \"Elevated Blood Pressure: Care Instructions. \"  Current as of: September 21, 2016  Content Version: 11.4  © 0195-5753 [employer]. Care instructions adapted under license by [employer] (which disclaims liability or warranty for this information). If you have questions about a medical condition or this instruction, always ask your healthcare professional. [address] any warranty or liability for your use of this information. Patient Education        Cellulitis: Care Instructions  Your Care Instructions    Cellulitis is a skin infection caused by bacteria, most often strep or staph. It often occurs after a break in the skin from a scrape, cut, bite, or puncture, or after a rash. Cellulitis may be treated without doing tests to find out what caused it. But your doctor may do tests, if needed, to look for a specific bacteria, like methicillin-resistant Staphylococcus aureus (MRSA). The doctor has checked you carefully, but problems can develop later.  If you notice any problems or new symptoms, get medical treatment right away. Follow-up care is a key part of your treatment and safety. Be sure to make and go to all appointments, and call your doctor if you are having problems. It's also a good idea to know your test results and keep a list of the medicines you take. How can you care for yourself at home? · Take your antibiotics as directed. Do not stop taking them just because you feel better. You need to take the full course of antibiotics. · Prop up the infected area on pillows to reduce pain and swelling. Try to keep the area above the level of your heart as often as you can. · If your doctor told you how to care for your wound, follow your doctor's instructions. If you did not get instructions, follow this general advice:  ? Wash the wound with clean water 2 times a day. Don't use hydrogen peroxide or alcohol, which can slow healing. ? You may cover the wound with a thin layer of petroleum jelly, such as Vaseline, and a nonstick bandage. ? Apply more petroleum jelly and replace the bandage as needed. · Be safe with medicines. Take pain medicines exactly as directed. ? If the doctor gave you a prescription medicine for pain, take it as prescribed. ? If you are not taking a prescription pain medicine, ask your doctor if you can take an over-the-counter medicine. To prevent cellulitis in the future  · Try to prevent cuts, scrapes, or other injuries to your skin. Cellulitis most often occurs where there is a break in the skin. · If you get a scrape, cut, mild burn, or bite, wash the wound with clean water as soon as you can to help avoid infection. Don't use hydrogen peroxide or alcohol, which can slow healing. · If you have swelling in your legs (edema), support stockings and good skin care may help prevent leg sores and cellulitis. · Take care of your feet, especially if you have diabetes or other conditions that increase the risk of infection.  Wear shoes and socks. Do not go barefoot. If you have athlete's foot or other skin problems on your feet, talk to your doctor about how to treat them. When should you call for help? Call your doctor now or seek immediate medical care if:    · You have signs that your infection is getting worse, such as:  ? Increased pain, swelling, warmth, or redness. ? Red streaks leading from the area. ? Pus draining from the area. ? A fever.     · You get a rash.    Watch closely for changes in your health, and be sure to contact your doctor if:    · You do not get better as expected. Where can you learn more? Go to http://suzan-kinga.info/. Rach Mccloud in the search box to learn more about \"Cellulitis: Care Instructions. \"  Current as of: April 17, 2018  Content Version: 11.9  © 4477-2130 [employer]. Care instructions adapted under license by [employer] (which disclaims liability or warranty for this information). If you have questions about a medical condition or this instruction, always ask your healthcare professional. [place] [address] any warranty or liability for your use of this information. Patient Education        Leg and Ankle Edema: Care Instructions  Your Care Instructions  Swelling in the legs, ankles, and feet is called edema. It is common after you sit or stand for a while. Long plane flights or car rides often cause swelling in the legs and feet. You may also have swelling if you have to stand for long periods of time at your job. Problems with the veins in the legs (varicose veins) and changes in hormones can also cause swelling. Sometimes the swelling in the ankles and feet is caused by a more serious problem, such as heart failure, infection, blood clots, or liver or kidney disease. Follow-up care is a key part of your treatment and safety. Be sure to make and go to all appointments, and call your doctor if you are having problems.  It's also a good idea to know your test results and keep a list of the medicines you take. How can you care for yourself at home? · If your doctor gave you medicine, take it as prescribed. Call your doctor if you think you are having a problem with your medicine. · Whenever you are resting, raise your legs up. Try to keep the swollen area higher than the level of your heart. · Take breaks from standing or sitting in one position. ? Walk around to increase the blood flow in your lower legs. ? Move your feet and ankles often while you stand, or tighten and relax your leg muscles. · Wear support stockings. Put them on in the morning, before swelling gets worse. · Eat a balanced diet. Lose weight if you need to. · Limit the amount of salt (sodium) in your diet. Salt holds fluid in the body and may increase swelling. When should you call for help? Call 911 anytime you think you may need emergency care. For example, call if:    · You have symptoms of a blood clot in your lung (called a pulmonary embolism). These may include:  ? Sudden chest pain. ? Trouble breathing. ? Coughing up blood.    Call your doctor now or seek immediate medical care if:    · You have signs of a blood clot, such as:  ? Pain in your calf, back of the knee, thigh, or groin. ? Redness and swelling in your leg or groin.     · You have symptoms of infection, such as:  ? Increased pain, swelling, warmth, or redness. ? Red streaks or pus. ? A fever.    Watch closely for changes in your health, and be sure to contact your doctor if:    · Your swelling is getting worse.     · You have new or worsening pain in your legs.     · You do not get better as expected. Where can you learn more? Go to http://suzan-kinga.info/. Enter J942 in the search box to learn more about \"Leg and Ankle Edema: Care Instructions. \"  Current as of: September 23, 2018  Content Version: 11.9  © 3933-1904 [employer].  Care instructions adapted under license by [employer] (which disclaims liability or warranty for this information). If you have questions about a medical condition or this instruction, always ask your healthcare professional. [address] any warranty or liability for your use of this information.

## 2019-04-25 NOTE — ED PROVIDER NOTES
EMERGENCY DEPARTMENT HISTORY AND PHYSICAL EXAM 
 
12:15 PM 
 
 
Date: 4/25/2019 Patient Name: Saniya Emmanuel History of Presenting Illness Chief Complaint Patient presents with  Leg Swelling  
  bilateral   
 
 
 
History Provided By: Patient Location/Duration/Severity/Modifying factors Patient is a 80-year-old female with a history of chronic back pain the presents the emergency department with complaint of several weeks of increasing lower extremity edema but yesterday developed redness and blistering of her ankles. Her left ankle is hurting worse than her right. 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 She notes the swelling gets better when she elevates her legs but does not have a lot of time to do that. She denies any shortness of breath, orthopnea, dyspnea on exertion. Patient denies any cardiac history or any evidence of weight gain. She notes her left calf is has 2 out of 10 pain that is cramping without radiation. She has some chronic low back pain that radiates to her left foot with chronic paresthesias of the left toes. Patient denies smoking or drinking. She is not on any birth control and she had a tubal ligation in the past.  
 
 
 
 
PCP: None Current Outpatient Medications Medication Sig Dispense Refill  furosemide (LASIX) 20 mg tablet Take 1 Tab by mouth daily. 7 Tab 0  
 Comp Stocking,Knee,Long,Medium misc As directed 1 Package 0  
 cephALEXin (KEFLEX) 500 mg capsule Take 1 Cap by mouth three (3) times daily for 7 days. 21 Cap 0  
 gabapentin (NEURONTIN) 300 mg capsule 2 po tid as directed  Indications: NEUROPATHIC PAIN 180 Cap 3  
 nabumetone (RELAFEN) 500 mg tablet Take 1 Tab by mouth two (2) times daily (with meals). 60 Tab 1  
 acetaminophen (TYLENOL) 325 mg tablet Take  by mouth every four (4) hours as needed for Pain. Past History Past Medical History: No past medical history on file. Past Surgical History: 
Past Surgical History:  
Procedure Laterality Date  HX BACK SURGERY    
 removed abscess Family History: 
Family History Problem Relation Age of Onset  Stroke Mother 54  Hypertension Mother  High Cholesterol Mother  Hypertension Brother Social History: 
Social History Tobacco Use  Smoking status: Never Smoker  Smokeless tobacco: Never Used Substance Use Topics  Alcohol use: No  
  Alcohol/week: 0.0 oz  Drug use: No  
 
 
Allergies: 
No Known Allergies Review of Systems Review of Systems Constitutional: Negative for activity change, fatigue and fever. HENT: Negative for congestion and rhinorrhea. Eyes: Negative for visual disturbance. Respiratory: Negative for shortness of breath. Cardiovascular: Positive for leg swelling. Negative for chest pain and palpitations. Gastrointestinal: Negative for abdominal pain, diarrhea, nausea and vomiting. Genitourinary: Negative for dysuria and hematuria. Musculoskeletal: Negative for back pain. Skin: Positive for color change and rash. Neurological: Negative for dizziness, weakness and light-headedness. All other systems reviewed and are negative. Physical Exam  
 
Visit Vitals BP (!) 131/99 Pulse 67 Temp 98.3 °F (36.8 °C) Resp 18 SpO2 100% Physical Exam  
Constitutional: She is oriented to person, place, and time. She appears well-developed and well-nourished. No distress. HENT:  
Head: Normocephalic and atraumatic. Right Ear: External ear normal.  
Left Ear: External ear normal.  
Nose: Nose normal.  
Mouth/Throat: Oropharynx is clear and moist.  
Eyes: Pupils are equal, round, and reactive to light. Conjunctivae and EOM are normal. No scleral icterus. Neck: Normal range of motion. Neck supple. No JVD present. No tracheal deviation present. No thyromegaly present. Cardiovascular: Normal rate, regular rhythm, normal heart sounds and intact distal pulses. Exam reveals no gallop and no friction rub. No murmur heard. Pulmonary/Chest: Effort normal and breath sounds normal. She exhibits no tenderness. Abdominal: Soft. Bowel sounds are normal. She exhibits no distension. There is no tenderness. There is no rebound and no guarding. Musculoskeletal: She exhibits edema. She exhibits no tenderness. Pitting edema of the bilateral lower extremities erythema of bilateral ankles, thin bulla left anterior dorsal ankle, no ulcerations noted, distal pulses are equal, mild left calf pain, not warm to touch Lymphadenopathy:  
  She has no cervical adenopathy. Neurological: She is alert and oriented to person, place, and time. No cranial nerve deficit. Coordination normal.  
No sensory loss, Gait normal, Motor 5/5 Skin: Skin is warm and dry. Rash noted. As above Psychiatric: She has a normal mood and affect. Her behavior is normal. Judgment and thought content normal.  
Nursing note and vitals reviewed. Diagnostic Study Results Labs - Recent Results (from the past 12 hour(s)) D DIMER Collection Time: 04/25/19 11:38 AM  
Result Value Ref Range D DIMER 1.13 (H) <0.46 ug/ml(FEU) CBC WITH AUTOMATED DIFF Collection Time: 04/25/19 11:38 AM  
Result Value Ref Range WBC 4.4 (L) 4.6 - 13.2 K/uL  
 RBC 4.72 4.20 - 5.30 M/uL  
 HGB 11.6 (L) 12.0 - 16.0 g/dL HCT 35.7 35.0 - 45.0 % MCV 75.6 74.0 - 97.0 FL  
 MCH 24.6 24.0 - 34.0 PG  
 MCHC 32.5 31.0 - 37.0 g/dL  
 RDW 14.2 11.6 - 14.5 % PLATELET 879 236 - 677 K/uL MPV 9.5 9.2 - 11.8 FL  
 NEUTROPHILS 47 40 - 73 % LYMPHOCYTES 42 21 - 52 % MONOCYTES 6 3 - 10 % EOSINOPHILS 5 0 - 5 % BASOPHILS 0 0 - 2 %  
 ABS. NEUTROPHILS 2.1 1.8 - 8.0 K/UL  
 ABS. LYMPHOCYTES 1.9 0.9 - 3.6 K/UL  
 ABS. MONOCYTES 0.3 0.05 - 1.2 K/UL  
 ABS. EOSINOPHILS 0.2 0.0 - 0.4 K/UL ABS. BASOPHILS 0.0 0.0 - 0.1 K/UL  
 DF AUTOMATED METABOLIC PANEL, COMPREHENSIVE Collection Time: 04/25/19 11:38 AM  
Result Value Ref Range Sodium 140 136 - 145 mmol/L Potassium 3.9 3.5 - 5.5 mmol/L Chloride 108 100 - 108 mmol/L  
 CO2 28 21 - 32 mmol/L Anion gap 4 3.0 - 18 mmol/L Glucose 78 74 - 99 mg/dL BUN 7 7.0 - 18 MG/DL Creatinine 0.72 0.6 - 1.3 MG/DL  
 BUN/Creatinine ratio 10 (L) 12 - 20 GFR est AA >60 >60 ml/min/1.73m2 GFR est non-AA >60 >60 ml/min/1.73m2 Calcium 9.0 8.5 - 10.1 MG/DL Bilirubin, total 0.3 0.2 - 1.0 MG/DL  
 ALT (SGPT) 29 13 - 56 U/L  
 AST (SGOT) 16 15 - 37 U/L Alk. phosphatase 73 45 - 117 U/L Protein, total 7.5 6.4 - 8.2 g/dL Albumin 3.8 3.4 - 5.0 g/dL Globulin 3.7 2.0 - 4.0 g/dL A-G Ratio 1.0 0.8 - 1.7 NT-PRO BNP Collection Time: 04/25/19 11:38 AM  
Result Value Ref Range NT pro-BNP 29 0 - 450 PG/ML  
TROPONIN I Collection Time: 04/25/19 11:38 AM  
Result Value Ref Range Troponin-I, QT <0.02 0.0 - 0.045 NG/ML  
EKG, 12 LEAD, INITIAL Collection Time: 04/25/19 12:11 PM  
Result Value Ref Range Ventricular Rate 63 BPM  
 Atrial Rate 63 BPM  
 P-R Interval 164 ms QRS Duration 88 ms Q-T Interval 426 ms  
 QTC Calculation (Bezet) 435 ms Calculated P Axis 56 degrees Calculated R Axis 55 degrees Calculated T Axis 57 degrees Diagnosis Normal sinus rhythm Normal ECG When compared with ECG of 05-SEP-2011 17:30, No significant change was found Confirmed by Nadir Baum MD, --- ([id number]) on 4/25/2019 12:52:36 PM 
  
URINALYSIS W/ RFLX MICROSCOPIC Collection Time: 04/25/19  2:40 PM  
Result Value Ref Range Color YELLOW Appearance CLEAR Specific gravity 1.024 1.005 - 1.030    
 pH (UA) 5.5 5.0 - 8.0 Protein NEGATIVE  NEG mg/dL Glucose NEGATIVE  NEG mg/dL Ketone NEGATIVE  NEG mg/dL Bilirubin NEGATIVE  NEG Blood NEGATIVE  NEG Urobilinogen 0.2 0.2 - 1.0 EU/dL Nitrites NEGATIVE  NEG Leukocyte Esterase NEGATIVE  NEG Radiologic Studies -  
XR CHEST PA LAT Final Result IMPRESSION:  
  
No acute abnormalities. Medical Decision Making I am the first provider for this patient. I reviewed the vital signs, available nursing notes, past medical history, past surgical history, family history and social history. Vital Signs-Reviewed the patient's vital signs. EKG: Sinus rhythm at 63 no STEMI read at 1211 by [employer] [employer] Records Reviewed: Nursing Notes and Old Medical Records (Time of Review: 12:15 PM) ED Course: Progress Notes, Reevaluation, and Consults: Workup and recommendations were reviewed with the patient and all questions were answered. The patient understands the plan and will proceed with close outpatient care. I have encouraged the patient to return if at all worsened or concerned. [employer], DO 4:05 PM 
 
 
Provider Notes (Medical Decision Making): MDM Number of Diagnoses or Management Options Cellulitis of lower extremity, unspecified laterality:  
Pedal edema:  
Diagnosis management comments: She is a 59-year-old female with a history of chronic low back pain the presents the emergency department with complaint of edema now with blistering and redness. 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 Her d-dimer is elevated so obtain an ultrasound the ultrasound did not clearly show a DVT. Patient was given Lasix and her edema improved. We will give her a limited number of Lasix refer her to a primary doctor will give her compression hose and treat her with 7 days of antibiotics for potential cellulitis. Patient agrees with the plan will follow-up with her primary doctor and return if at all worsen or concern. [employer], DO 4:05 PM 
 
 
 
Procedures Diagnosis Clinical Impression: 1. Pedal edema 2. Cellulitis of lower extremity, unspecified laterality Disposition: DC Follow-up Information Follow up With Specialties Details Why Contact Info  - [place]  Today she will help you with appointments  DR. LARRY'S [address] [address] 
[phone] Marilyn Tejada MD Internal Medicine In 1 day  [address] [place] [address] [address] [place] [address] 
[phone] [facility] EMERGENCY DEPT Emergency Medicine  As needed, If symptoms worsen [address] [id number] 
[phone] Patient's Medications Start Taking CEPHALEXIN (KEFLEX) 500 MG CAPSULE    Take 1 Cap by mouth three (3) times daily for 7 days. COMP STOCKING,KNEE,LONG,MEDIUM MISC    As directed FUROSEMIDE (LASIX) 20 MG TABLET    Take 1 Tab by mouth daily. Continue Taking ACETAMINOPHEN (TYLENOL) 325 MG TABLET    Take  by mouth every four (4) hours as needed for Pain. GABAPENTIN (NEURONTIN) 300 MG CAPSULE    2 po tid as directed  Indications: NEUROPATHIC PAIN  
 NABUMETONE (RELAFEN) 500 MG TABLET    Take 1 Tab by mouth two (2) times daily (with meals). These Medications have changed No medications on file Stop Taking GABAPENTIN (NEURONTIN) 300 MG CAPSULE    1 in the am and 2 in the evening as directed  Indications: NEUROPATHIC PAIN  
 IBUPROFEN (MOTRIN) 600 MG TABLET    Take 1 Tab by mouth every six (6) hours as needed for Pain. METHOCARBAMOL (ROBAXIN) 500 MG TABLET    Take 1 Tab by mouth four (4) times daily. METHYLPREDNISOLONE (MEDROL DOSEPACK) 4 MG TABLET    Take as directed PREDNISONE (DELTASONE) 10 MG TABLET    2 pills in am for 3 days, then 1 pill in am for 3 days and 1/2 pill in am for remainder TRAMADOL (ULTRAM) 50 MG TABLET    Take 1 Tab by mouth every six (6) hours as needed for Pain. Max Daily Amount: 200 mg.   
 
Disclaimer: Sections of this note are dictated using utilizing voice recognition software. Minor typographical errors may be present. If questions arise, please do not hesitate to contact me or call our department.

## 2019-04-25 NOTE — LETTER
[address] [place] [address] Dr [facility] EMERGENCY DEPT 
[address] [facility] [address] 
[phone] Work/School Note Date: 4/25/2019 To Whom It May concern: 
 
Jersey Mcmahan was seen and treated today in the emergency room by the following provider(s): 
Attending Provider: Joie Mayers MD. Jersey Mcmahan may return to work on 4/26/19.  
 
Sincerely, 
 
 
 
 
Rosa Lindo MD

## 2019-04-25 NOTE — ED TRIAGE NOTES
Pt c/o bilateral leg swelling and foot swelling since yesterday. Denies pain. Two small  blisters noted on right foot.

## 2019-05-15 ENCOUNTER — OFFICE VISIT (OUTPATIENT)
Dept: FAMILY MEDICINE CLINIC | Age: 39
End: 2019-05-15

## 2019-05-15 VITALS
SYSTOLIC BLOOD PRESSURE: 118 MMHG | BODY MASS INDEX: 45.99 KG/M2 | WEIGHT: 293 LBS | HEIGHT: 67 IN | OXYGEN SATURATION: 98 % | DIASTOLIC BLOOD PRESSURE: 75 MMHG | HEART RATE: 64 BPM | RESPIRATION RATE: 18 BRPM | TEMPERATURE: 98.5 F

## 2019-05-15 DIAGNOSIS — M51.26 HNP (HERNIATED NUCLEUS PULPOSUS), LUMBAR: Primary | ICD-10-CM

## 2019-05-15 DIAGNOSIS — Z13.0 SCREENING FOR ENDOCRINE, METABOLIC AND IMMUNITY DISORDER: ICD-10-CM

## 2019-05-15 DIAGNOSIS — Z13.228 SCREENING FOR ENDOCRINE, METABOLIC AND IMMUNITY DISORDER: ICD-10-CM

## 2019-05-15 DIAGNOSIS — Z13.220 ENCOUNTER FOR LIPID SCREENING FOR CARDIOVASCULAR DISEASE: ICD-10-CM

## 2019-05-15 DIAGNOSIS — Z13.29 SCREENING FOR ENDOCRINE, METABOLIC AND IMMUNITY DISORDER: ICD-10-CM

## 2019-05-15 DIAGNOSIS — Z13.1 SCREENING FOR DIABETES MELLITUS: ICD-10-CM

## 2019-05-15 DIAGNOSIS — I87.2 VENOUS STASIS DERMATITIS OF BOTH LOWER EXTREMITIES: ICD-10-CM

## 2019-05-15 DIAGNOSIS — I87.2 VENOUS INSUFFICIENCY: ICD-10-CM

## 2019-05-15 DIAGNOSIS — Z13.6 ENCOUNTER FOR LIPID SCREENING FOR CARDIOVASCULAR DISEASE: ICD-10-CM

## 2019-05-15 DIAGNOSIS — R20.0 NUMBNESS OF TOES: ICD-10-CM

## 2019-05-15 DIAGNOSIS — M48.061 SPINAL STENOSIS OF LUMBAR REGION, UNSPECIFIED WHETHER NEUROGENIC CLAUDICATION PRESENT: ICD-10-CM

## 2019-05-15 RX ORDER — TRIAMCINOLONE ACETONIDE 1 MG/G
CREAM TOPICAL
Qty: 45 G | Refills: 1 | Status: SHIPPED | OUTPATIENT
Start: 2019-05-15

## 2019-05-15 RX ORDER — GABAPENTIN 300 MG/1
300 CAPSULE ORAL 2 TIMES DAILY
Qty: 60 CAP | Refills: 1 | Status: SHIPPED | OUTPATIENT
Start: 2019-05-15

## 2019-05-15 RX ORDER — METHOCARBAMOL 500 MG/1
500 TABLET, FILM COATED ORAL
Qty: 30 TAB | Refills: 1 | Status: SHIPPED | OUTPATIENT
Start: 2019-05-15

## 2019-05-15 NOTE — PROGRESS NOTES
Chief Complaint   Patient presents with   Seth Establish Care    Numbness     in left foot      1. Have you been to the ER, urgent care clinic since your last visit? Hospitalized since your last visit? Yes When: 04/25/2019  Where: [facility]  Reason for visit: blister on foot     2. Have you seen or consulted any other health care providers outside of the [address] [place] [address] since your last visit? Include any pap smears or colon screening.  No

## 2019-05-15 NOTE — PROGRESS NOTES
OFFICE NOTE    Lenwood Harada is a 45 y.o. female presenting today for office visit. 5/15/2019  11:23 AM    Chief Complaint   Patient presents with    Establish Care    Numbness     in left foot        HPI: Here today as a new patient, establishing care. Used to see [facility] for PCP. Last routine labs completed in 2015 with some done also in 4/2019. Not following with any specialists regularly but has seen spine specialist in the past.     HNP/Spinal Stenosis: She reports that she has been having numbness of her left toes for at least a year. She was evaluated by spine center who completed MRI spine showing HNP and spinal stenosis. She has been managing on Gabapentin at  but does not feel like it is helping much. She has not seen spine center recently. No urinary or bowel incontinence. Also reports continued leg swelling and some rashes that seem to come and go on her legs. She notices it worse with legs are in dependent position for too long but sometimes swelling noted even without. She is not wearing compression stockings as ordered from ER visit on 4/25. She has been taking Lasix PRN as prescribed by ED which is helping very little. Review of Systems   Constitutional: Negative for chills, fatigue and fever. Respiratory: Negative for cough, shortness of breath and wheezing. Cardiovascular: Positive for leg swelling. Negative for chest pain and palpitations. Gastrointestinal: Negative for abdominal pain, constipation, diarrhea, nausea and vomiting. Genitourinary: Negative for difficulty urinating and frequency. Musculoskeletal: Positive for back pain. Negative for arthralgias and myalgias. Skin: Positive for rash. Neurological: Positive for numbness. Negative for dizziness and headaches.          PHQ Screening   3 most recent PHQ Screens 5/15/2019   Little interest or pleasure in doing things Not at all   Feeling down, depressed, irritable, or hopeless Not at all   Total Score PHQ 2 0         History  Past Medical History:   Diagnosis Date    HNP (herniated nucleus pulposus), lumbar 2019    Spinal stenosis, lumbar 2019       Past Surgical History:   Procedure Laterality Date    IR DRAIN CUTANEOUS/SUBCU ABSCESS      back       Social History     Socioeconomic History    Marital status:      Spouse name: Not on file    Number of children: Not on file    Years of education: Not on file    Highest education level: Not on file   Occupational History    Not on file   Social Needs    Financial resource strain: Not on file    Food insecurity:     Worry: Not on file     Inability: Not on file    Transportation needs:     Medical: Not on file     Non-medical: Not on file   Tobacco Use    Smoking status: Never Smoker    Smokeless tobacco: Never Used   Substance and Sexual Activity    Alcohol use: No     Alcohol/week: 0.0 oz    Drug use: No    Sexual activity: Yes     Partners: Male     Birth control/protection: None   Lifestyle    Physical activity:     Days per week: 0 days     Minutes per session: 0 min    Stress: Not at all   Relationships    Social connections:     Talks on phone: Not on file     Gets together: Not on file     Attends [employer] service: Not on file     Active member of club or organization: Not on file     Attends meetings of clubs or organizations: Not on file     Relationship status: Not on file    Intimate partner violence:     Fear of current or ex partner: Not on file     Emotionally abused: Not on file     Physically abused: Not on file     Forced sexual activity: Not on file   Other Topics Concern     Service Not Asked    Blood Transfusions Not Asked    Caffeine Concern Yes     Comment: daily 12oz    Occupational Exposure Not Asked    Hobby Hazards Not Asked    Sleep Concern No    Stress Concern No    Weight Concern No    Special Diet Not Asked    Back Care Not Asked    Exercise No    Bike Helmet Not Asked    Seat Belt No    Self-Exams Yes   Social History Narrative    Not on file       Family History   Problem Relation Age of Onset    Stroke Mother 54    Hypertension Mother     High Cholesterol Mother     Hypertension Brother        No Known Allergies    Current Outpatient Medications   Medication Sig Dispense Refill    furosemide (LASIX) 20 mg tablet Take 1 Tab by mouth daily. 7 Tab 0    gabapentin (NEURONTIN) 300 mg capsule 2 po tid as directed  Indications: NEUROPATHIC PAIN (Patient taking differently: Take 300 mg by mouth daily. 2 po tid as directed  Indications: Neuropathic Pain) 180 Cap 3    acetaminophen (TYLENOL) 325 mg tablet Take  by mouth every four (4) hours as needed for Pain.  Comp Stocking,Knee,Long,Medium misc As directed 1 Package 0           Advance Care Planning:   Patient was offered the opportunity to discuss advance care planning NO   Does patient have an Advance Directive: If no, did you provide information on [facility] Connections?          Patient Care Team:  Patient Care Team:  Nava Barakat NP as PCP - General (Nurse Practitioner)        LABS:    Lab Results   Component Value Date/Time    WBC 4.4 (L) 04/25/2019 11:38 AM    Hemoglobin, POC 11.6 (L) 09/05/2011 06:10 PM    HGB 11.6 (L) 04/25/2019 11:38 AM    Hematocrit, POC 34 (L) 09/05/2011 06:10 PM    HCT 35.7 04/25/2019 11:38 AM    PLATELET 719 96/09/7237 11:38 AM    MCV 75.6 04/25/2019 11:38 AM     Lab Results   Component Value Date/Time    Sodium 140 04/25/2019 11:38 AM    Potassium 3.9 04/25/2019 11:38 AM    Chloride 108 04/25/2019 11:38 AM    CO2 28 04/25/2019 11:38 AM    Anion gap 4 04/25/2019 11:38 AM    Glucose 78 04/25/2019 11:38 AM    BUN 7 04/25/2019 11:38 AM    Creatinine 0.72 04/25/2019 11:38 AM    BUN/Creatinine ratio 10 (L) 04/25/2019 11:38 AM    GFR est AA >60 04/25/2019 11:38 AM    GFR est non-AA >60 04/25/2019 11:38 AM    Calcium 9.0 04/25/2019 11:38 AM    Bilirubin, total 0.3 04/25/2019 11:38 AM    AST (SGOT) 16 04/25/2019 11:38 AM    Alk. phosphatase 73 04/25/2019 11:38 AM    Protein, total 7.5 04/25/2019 11:38 AM    Albumin 3.8 04/25/2019 11:38 AM    Globulin 3.7 04/25/2019 11:38 AM    A-G Ratio 1.0 04/25/2019 11:38 AM    ALT (SGPT) 29 04/25/2019 11:38 AM         RADIOLOGY:    4/25/19  Interpretation Summary     · No evidence of acute deep vein thrombosis in the right common femoral, superficial femoral, popliteal, posterior tibial, and peroneal veins. The veins were imaged in the transverse and longitudinal planes. The vessels showed normal color filling and compressibility. Doppler interrogation showed phasic and spontaneous flow. · The peroneal vein(s) are grossly patent but cannot exclude non-occlusive thrombus. The common femoral, greater saphenous, femoral, popliteal and posterior tibial vein(s) were imaged in the transverse and longitudinal planes. The vessels showed normal color filling and compressibility. Doppler interrogation of the veins showed phasic and spontaneous flow. MRI Results (most recent):  Results from [place] encounter on 07/03/18   MRI LUMB SPINE WO CONT    Narrative MR Lumbar Spine Without Contrast    HISTORY: Low back pain and left leg pain for one year; reported history of  \"slipped disc\". COMPARISON: Plain films June 2018    Technique: Multi-sequence multiplanar T1, T2, STIR imaging with and without fat  saturation obtained centered on the lumbar spine. FINDINGS:   Alignment: Intact lordosis  Vertebral body height: Normal  Marrow signal: Unremarkable  Disc spaces: Disc desiccation with mild disc space narrowing at L5-S1. Conus: Terminates at L1-2    Axial imaging correlation:    T12-L1: Patent canal and foramina. L1-2: Patent canal and foramina. L2-3: Mild facet hypertrophy. Patent canal and foramina. L3-4: Mild facet arthropathy.  Small T2 bright focus lateral left facet  articulation could be a venous varix or small extruding joint cyst. Patent canal  and foramina. L4-5: Mild broad-based disc protrusion. Left foraminal to far lateral annular  tear. Mild facet arthropathy. No significant spinal stenosis. Adequately patent  foramina . L5-S1: Bilobed left paracentral disc extrusion caudad. An underlying broad-based  disc osteophyte complex. There is high signal intensity within the extrusion. There is impingement of the crossing left S1 nerve as on axial T2 images 8-9. Also reference sagittal images 6-8. An overall mild towards moderate spinal  stenosis. Adequately patent foramina. Other structures: Unremarkable. Impression IMPRESSION:    1. Left L5-S1 disc extrusion with impingement of the crossing left S1 nerve.  -A mild to moderate spinal stenosis at this level.  -Other levels of lesser degenerative findings as delineated above. Thank you for this referral.           Physical Exam   Constitutional: She is oriented to person, place, and time. She appears well-developed and well-nourished. No distress. Neck: Normal range of motion. Neck supple. No thyromegaly present. Cardiovascular: Normal rate, regular rhythm and normal heart sounds. No murmur heard. Pulmonary/Chest: Effort normal and breath sounds normal. No respiratory distress. Abdominal: Soft. Bowel sounds are normal. There is no tenderness. Musculoskeletal: She exhibits no edema. Lumbar back: She exhibits pain. She exhibits normal range of motion, no tenderness, no swelling and no spasm. Left foot: There is normal range of motion, no tenderness, normal capillary refill and no crepitus. Neurological: She is alert and oriented to person, place, and time. She exhibits normal muscle tone. Coordination and gait normal.   Skin: Skin is warm and dry. Rash noted. Rash is maculopapular.  No erythema.   -bilateral lower legs below knee with some skin darkening and thickening; old scars noted of old ulcerations; mild maculopapular rash BLE; no erythema or weeping; +1 swelling BLE         Vitals:    05/15/19 1108   BP: 118/75   Pulse: 64   Resp: 18   Temp: 98.5 °F (36.9 °C)   TempSrc: Oral   SpO2: 98%   Weight: 311 lb 12.8 oz (141.4 kg)   Height: 5' 7\" (1.702 m)   PainSc:   0 - No pain   LMP: 05/06/2019         Assessment and Plan    HNP (herniated nucleus pulposus), lumbar./ Spinal stenosis of lumbar region, unspecified whether neurogenic claudication present  *Discussed with patient about MRI. Will try higher dose Gabapentin and add in PRN muscle relaxer. Advised about recommendation to follow up with spine center.   - gabapentin (NEURONTIN) 300 mg capsule; Take 1 Cap by mouth two (2) times a day. Indications: Neuropathic Pain  Dispense: 60 Cap; Refill: 1  - methocarbamol (ROBAXIN) 500 mg tablet; Take 1 Tab by mouth every twelve (12) hours as needed (muscle spasms). Dispense: 30 Tab; Refill: 1    Numbness of toes  *Appears to be due to above, but also may be due to venous issues below. Venous insufficiency  *Discussed with patient about the long term effects that are being noted including skin changes. Advised on need to wear compression stockings during the day when awake and unable to keep legs elevated. Refer to vascular for further advice and management.  - REFERRAL TO VASCULAR CENTER    Venous stasis dermatitis of both lower extremities  *Can trial some moderate potency steroid cream but I advised on not using long term and using good moisturizer as main management  - triamcinolone acetonide (KENALOG) 0.1 % topical cream; Apply  to affected area two (2) times daily as needed for Skin Irritation or Itching. Do not use more than 2 wks. Use good moisturizer too  Dispense: 45 g; Refill: 1    Screening for endocrine, metabolic and immunity disorder  - CBC W/O DIFF; Future  - METABOLIC PANEL, COMPREHENSIVE; Future  - TSH 3RD GENERATION; Future  - URINALYSIS W/MICROSCOPIC; Future    Screening for diabetes mellitus  - METABOLIC PANEL, COMPREHENSIVE;  Future    Encounter for lipid screening for cardiovascular disease  - LIPID PANEL; Future        *Plan of care reviewed with patient. Patient in agreement with plan and expresses understanding. All questions answered and patient encouraged to call or RTO if further questions or concerns. Follow-up and Dispositions    · Return in about 2 weeks (around 5/29/2019) for well-woman visit with fasting labs- 30 mins. Majo Katz

## 2019-05-15 NOTE — PATIENT INSTRUCTIONS
[address] [facility]  [address], [place], [address]  Phone: [phone] About Venous Insufficiency  What is it? Venous insufficiency is a problem with the flow of blood from the veins of the legs back to the heart. It's also called chronic venous insufficiency or chronic venous stasis. Your veins bring blood back to the heart after it flows through your body. Veins have valves that keep the blood moving in one direction--toward the heart. But with venous insufficiency, the veins of the legs might not work as they should. This can allow blood to leak backward. Fluid can pool in the legs. This can lead to problems that include varicose veins. What causes it? Venous insufficiency is sometimes caused by deep vein thrombosis and high blood pressure inside leg veins. You are more likely to have venous insufficiency if you:  · Are older. · Are female. · Are overweight. · Don't get enough physical activity. · Smoke. · Have a family history of varicose veins. What are the symptoms? Symptoms of venous insufficiency affect the legs. They may include:  · Swelling, often in the ankles. · A rash. · Varicose veins. · Itching. · Cramping. · Skin sores (ulcers). · Aching or a feeling of heaviness. · Changes in skin color. How is it diagnosed? Your doctor can diagnose venous insufficiency by examining your legs and by using a type of ultrasound test (duplex Doppler) to find out how well blood is flowing in your legs. How is it treated? To reduce swelling and relieve pain caused by venous insufficiency, you can wear compression stockings. They are tighter at the ankles than at the top of the legs. They also can help venous skin ulcers heal. But there are different types of stockings, and they need to fit right. So your doctor will recommend what you need. You also can try to:  · Get more exercise, especially walking.  It can increase blood flow.  · Avoid standing still or sitting for a long time, which can make the fluid pool in your legs. · Try not to sit with your legs crossed at the knee. · Keep your legs raised above your heart when you're lying down. This reduces swelling. If these treatments don't work, you may need medicine or a procedure to help relieve symptoms. Procedures might be done to close the vein, to remove the vein, or to improve blood flow. Follow-up care is a key part of your treatment and safety. Be sure to make and go to all appointments, and call your doctor if you are having problems. It's also a good idea to know your test results and keep a list of the medicines you take. Current as of: September 26, 2018  Content Version: 11.9  © 5687-7553 [employer]. Care instructions adapted under license by [employer] (which disclaims liability or warranty for this information). If you have questions about a medical condition or this instruction, always ask your healthcare professional. [place] [address] any warranty or liability for your use of this information.

## 2019-05-21 ENCOUNTER — OFFICE VISIT (OUTPATIENT)
Dept: VASCULAR SURGERY | Age: 39
End: 2019-05-21

## 2019-05-21 VITALS
HEIGHT: 67 IN | RESPIRATION RATE: 16 BRPM | SYSTOLIC BLOOD PRESSURE: 138 MMHG | BODY MASS INDEX: 45.99 KG/M2 | WEIGHT: 293 LBS | DIASTOLIC BLOOD PRESSURE: 88 MMHG | HEART RATE: 72 BPM

## 2019-05-21 DIAGNOSIS — I87.2 VENOUS (PERIPHERAL) INSUFFICIENCY: ICD-10-CM

## 2019-05-21 DIAGNOSIS — R60.0 BILATERAL LEG EDEMA: Primary | ICD-10-CM

## 2019-05-21 PROBLEM — M54.50 ACUTE BILATERAL LOW BACK PAIN WITHOUT SCIATICA: Status: RESOLVED | Noted: 2017-03-06 | Resolved: 2019-05-21

## 2019-05-21 PROBLEM — M51.26 HNP (HERNIATED NUCLEUS PULPOSUS), LUMBAR: Status: ACTIVE | Noted: 2019-05-21

## 2019-05-21 PROBLEM — M54.40 ACUTE BILATERAL LOW BACK PAIN WITH SCIATICA: Status: RESOLVED | Noted: 2018-06-26 | Resolved: 2019-05-21

## 2019-05-21 PROBLEM — M15.9 PRIMARY OSTEOARTHRITIS INVOLVING MULTIPLE JOINTS: Status: RESOLVED | Noted: 2018-05-16 | Resolved: 2019-05-21

## 2019-05-21 PROBLEM — M48.061 SPINAL STENOSIS OF LUMBAR REGION: Status: ACTIVE | Noted: 2019-05-21

## 2019-05-21 PROBLEM — R29.898 LEFT LEG WEAKNESS: Status: RESOLVED | Noted: 2018-07-12 | Resolved: 2019-05-21

## 2019-05-21 PROBLEM — R20.0 NUMBNESS OF TOES: Status: RESOLVED | Noted: 2018-06-26 | Resolved: 2019-05-21

## 2019-05-21 RX ORDER — CEPHALEXIN 500 MG/1
500 CAPSULE ORAL 4 TIMES DAILY
COMMUNITY

## 2019-05-21 NOTE — PROGRESS NOTES
Evelio     Chief Complaint   Patient presents with    New Patient       History and Physical    Ms. Anna Awad is referred by her primary care physician for evaluation of venous insufficiency  She is a 28-year-old female, mother of 4, who had presented to the emergency room a few weeks ago with a significant onset of bilateral lower extremity edema. Along with this, she had developed redness and blistering of her ankles. In the emergency room she was diagnosed with cellulitis and treated with antibiotic therapy which has allowed some great improvement. Lasix was also administered with relief of swelling. She is still having some edema, but also now being a little bit better controlled with use of compression stockings that she was advised    She states that she did have some swelling with each of her pregnancies, but had never experienced anything quite this extent. 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 She is continuing with some topical skin care regimen as advised by her primary care    She does also suffer from symptoms related to sciatica. She had to quit her previous job at [employer] when she was on her feet and walking a lot because she cannot tolerate it for the back and sciatic pain. It primarily affects her left leg and she even has some chronic numbness in some of her toes.   She has says she has seen spine specialist and has done some physical therapy, but they are deterring from discussion for any kind of surgery at her age    Past Medical History:   Diagnosis Date    DDD (degenerative disc disease), cervical     Sciatica      Patient Active Problem List   Diagnosis Code    Acute bilateral low back pain without sciatica M54.5    Obesity, morbid (HCC) E66.01    Primary osteoarthritis involving multiple joints M15.0    Acute bilateral low back pain with sciatica M54.40    Numbness of toes R20.0    DDD (degenerative disc disease), lumbar M51.36    Left leg weakness R29.898     Past Surgical History:   Procedure Laterality Date    IR DRAIN CUTANEOUS/SUBCU ABSCESS      back     Current Outpatient Medications   Medication Sig Dispense Refill    cephALEXin (KEFLEX) 500 mg capsule Take 500 mg by mouth four (4) times daily.  gabapentin (NEURONTIN) 300 mg capsule Take 1 Cap by mouth two (2) times a day. Indications: Neuropathic Pain 60 Cap 1    methocarbamol (ROBAXIN) 500 mg tablet Take 1 Tab by mouth every twelve (12) hours as needed (muscle spasms). 30 Tab 1    triamcinolone acetonide (KENALOG) 0.1 % topical cream Apply  to affected area two (2) times daily as needed for Skin Irritation or Itching. Do not use more than 2 wks. Use good moisturizer too 45 g 1    furosemide (LASIX) 20 mg tablet Take 1 Tab by mouth daily. 7 Tab 0    Comp Stocking,Knee,Long,Medium misc As directed 1 Package 0    acetaminophen (TYLENOL) 325 mg tablet Take  by mouth every four (4) hours as needed for Pain.        No Known Allergies  Social History     Socioeconomic History    Marital status:      Spouse name: Not on file    Number of children: Not on file    Years of education: Not on file    Highest education level: Not on file   Occupational History    Not on file   Social Needs    Financial resource strain: Not on file    Food insecurity:     Worry: Not on file     Inability: Not on file    Transportation needs:     Medical: Not on file     Non-medical: Not on file   Tobacco Use    Smoking status: Never Smoker    Smokeless tobacco: Never Used   Substance and Sexual Activity    Alcohol use: No     Alcohol/week: 0.0 oz    Drug use: No    Sexual activity: Yes     Partners: Male     Birth control/protection: None   Lifestyle    Physical activity:     Days per week: 0 days     Minutes per session: 0 min    Stress: Not at all   Relationships    Social connections:     Talks on phone: Not on file     Gets together: Not on file     Attends [employer] service: Not on file     Active member of club or organization: Not on file     Attends meetings of clubs or organizations: Not on file     Relationship status: Not on file    Intimate partner violence:     Fear of current or ex partner: Not on file     Emotionally abused: Not on file     Physically abused: Not on file     Forced sexual activity: Not on file   Other Topics Concern     Service Not Asked    Blood Transfusions Not Asked    Caffeine Concern Yes     Comment: daily 12oz    Occupational Exposure Not Asked    Hobby Hazards Not Asked    Sleep Concern No    Stress Concern No    Weight Concern No    Special Diet Not Asked    Back Care Not Asked    Exercise No    Bike Helmet Not Asked    Seat Belt No    Self-Exams Yes   Social History Narrative    Not on file      Family History   Problem Relation Age of Onset    Stroke Mother 54    Hypertension Mother     High Cholesterol Mother     Hypertension Brother        Review of Systems    Review of Systems - History obtained from the patient  General ROS: negative  Psychological ROS: negative  Ophthalmic ROS: negative  Respiratory ROS: negative  Cardiovascular ROS: negative  Gastrointestinal ROS: negative  Musculoskeletal ROS: positive for - sciatic and low back pain  Neurological ROS: positive for - numbness/tingling  Dermatological ROS: per HPI  Vascular ROS: per HPI      Physical Exam:    Visit Vitals  /88 (BP 1 Location: Left arm, BP Patient Position: Sitting)   Pulse 72   Resp 16   Ht 5' 7\" (1.702 m)   Wt 311 lb (141.1 kg)   LMP 05/06/2019 (Exact Date)   BMI 48.71 kg/m²      General:  Alert, cooperative, no distress.    Head:  Normocephalic, without obvious abnormality, atraumatic. Eyes:    Conjunctivae/corneas clear. Pupils equal, round, reactive to light. Extraocular movements intact. Extremities: Bilateral edema of legs, approx 2+ which she states is improved from amount of swelling at onset    Pulses: Palpable with no pad signs   Skin: Healing blistered sites of ankle areas bilaterally, no evidence of cellulitis       Neurologic: Pt reports numbness in left lateral toes     Vascular studies:  Right Lower Venous     Technically difficult exam due to: patient body habitus and marked edema. No evidence of deep vein thrombosis in the common femoral, profunda femoral, superficial femoral, popliteal, posterior tibial, and peroneal veins. The veins were imaged in the transverse and longitudinal planes. The vessels showed normal color filling and compressibility. Doppler interrogation showed phasic and spontaneous flow. The right posterior tibial artery has triphasic waveforms. Left Lower Venous     The peroneal vein(s) are grossly patent but cannot exclude non-occlusive thrombus. The common femoral, greater saphenous, femoral, popliteal and posterior tibial vein(s) were imaged in the transverse and longitudinal planes. The vessels showed normal color filling and compressibility. Doppler interrogation of the veins showed phasic and spontaneous flow. The left posterior tibial artery has biphasic waveforms. Impression and Plan:  1. Bilateral leg edema    2. Venous (peripheral) insufficiency      Orders Placed This Encounter    cephALEXin (KEFLEX) 500 mg capsule       I discussed the pathology of venous insufficiency. I discussed to though that there can be multiple factors related to patients who have lower extremity edema.   Common treatments though for management of the symptoms do include efforts that she is doing with the compression stockings, but I also advised some range of motion exercises throughout the day and more consistent elevation routine throughout the day. I gave her literature to review as well. I also discussed the importance of a good skin care routine, diet, and including low sodium intake. We can certainly do more formal reflux evaluation to see if she has any of the underlying condition that could be amenable to alternate vein therapies. This will be arranged for her and I will call her with the results. Pending those results, further recommendations regarding follow-up with us or back to medical evaluation for other causes of swelling will be reviewed    ASH Canales    Portions of this note have been created using voice recognition software.

## 2019-06-12 ENCOUNTER — DOCUMENTATION ONLY (OUTPATIENT)
Dept: VASCULAR SURGERY | Age: 39
End: 2019-06-12

## 2019-06-12 NOTE — PROGRESS NOTES
I contacted Ms. Urias with results of her venous reflux study    Though she has a couple of focal areas of reflux in the superficial and deep systems of both legs, it is very minimal and I explained that any attempt to any type of ablation type procedures would probably not guarantee her any real positive results. Therefore encourage continued efforts with compression, elevation, range of motion, dietary efforts including watching sodium intake.     If symptoms progress we can always reevaluate otherwise we would just recommend follow-up as needed

## 2019-09-23 NOTE — PROGRESS NOTES
PT DAILY TREATMENT NOTE 12    Patient Name: Elza Tang  Date:2018  : 1980  [x]  Patient  Verified  Payor: BLUE CROSS / Plan: [facility] BL[facility] PPO / Product Type: PPO /    In time: 3:10  Out time: 4:15  Total Treatment Time (min): 51  Visit #: 3 of 10    Treatment Area: Pain in left knee [M25.562]  Pain in left hip [M25.552]  Low back pain [M54.5]    SUBJECTIVE  Pain Level (0-10 scale): 3  Any medication changes, allergies to medications, adverse drug reactions, diagnosis change, or new procedure performed?: [x] No    [] Yes (see summary sheet for update)  Subjective functional status/changes:   [] No changes reported  Not  Too bad.     OBJECTIVE    Modality rationale: decrease edema, decrease inflammation, decrease pain and increase tissue extensibility to improve the patients ability to perform ADL    Min Type Additional Details    [] Estim:  []Unatt       []IFC  []Premod                        []Other:  []w/ice   []w/heat  Position:  Location:    [] Estim: []Att    []TENS instruct  []NMES                    []Other:  []w/US   []w/ice   []w/heat  Position:  Location:    []  Traction: [] Cervical       []Lumbar                       [] Prone          []Supine                       []Intermittent   []Continuous Lbs:  [] before manual  [] after manual   8 [x]  Ultrasound: [x]Continuous   [] Pulsed                           [x]1MHz   []3MHz W/cm2:1.5  Location:(B) LSP    []  Iontophoresis with dexamethasone         Location: [] Take home patch   [] In clinic   8 [x]  Ice  post   []  heat  []  Ice massage  []  Laser   []  Anodyne Position:prone  Location:(L) HS    []  Laser with stim  []  Other:  Position:  Location:    []  Vasopneumatic Device Pressure:       [] lo [] med [] hi   Temperature: [] lo [] med [] hi   [x] Skin assessment post-treatment:  [x]intact []redness- no adverse reaction    []redness  adverse reaction:      min []Eval                  []Re-Eval       35 min Therapeutic Exercise:  [x] See flow sheet :   Rationale: increase ROM and increase strength to improve the patients ability to perform ADL      min Therapeutic Activity:  []  See flow sheet :   Rationale:   to improve the patients ability to     min Neuromuscular Re-education:  []  See flow sheet :   Rationale:   to improve the patients ability to      min Manual Therapy:     Rationale: decrease pain, increase ROM, increase tissue extensibility and decrease edema  to perform ADL     min Gait Training:  ___ feet with ___ device on level surfaces with ___ level of assist   Rationale: With   [x] TE   [] TA   [] neuro   [] other: Patient Education: [x] Review HEP    [] Progressed/Changed HEP based on:   [] positioning   [] body mechanics   [] transfers   [] heat/ice application    [] other:      Other Objective/Functional Measures:Fair  Response  To each there ex. Pain Level (0-10 scale) post treatment: 1    ASSESSMENT/Changes in Function: . Discussed  With pt on icing  (L) HS. Patient will continue to benefit from skilled PT services to address functional mobility deficits, address ROM deficits, address strength deficits, analyze and address soft tissue restrictions and analyze and cue movement patterns to attain remaining goals.      [x]  See Plan of Care  []  See progress note/recertification  []  See Discharge Summary         Progress towards goals / Updated goals:  Short Term Goals: To be accomplished in 5 treatments:  1.  Pt will be compliant and independent with HEP in order to facilitate PT sessions and aid with self management                        Eval Status:  Initiated                        Current Status: progressing 5/25/18  2.  Pt to tolerate 30 min or more of TE and/or Interventions w/o increased s/s                        Eval Status:  Initiated                        Current Status:                        Long Term Goals: To be accomplished in 10 treatments:  1.  Pt will report 50% improvement or better with involvement to show a significant increase in function                        Eval Status:  Initiated                        NBEQTEX Status: 36  5/25/18  2.  Pt will ambulate 500' w/o left antalgic gait for progress to usual community ambulation w/o difficulty                         Eval Status:  Initiated                        RACHDII Status:  3.  Pt will demonstrate the ability to perform (B) SLR to 70* or better to show increased strength, function and decreased neural tension Left LE                           Eval Status:  Initiated                        Current Status:  4.  Pt will demonstrate the ability to walk 2 flights of steps with a reciprocal gait patter with little discomfort to be able to walk up her steps at the end of the day w/o discomfort to prepare for bed                           Eval Status:  Initiated                        YVCMRWJ Status:  5.  Pt will improve knee FOTO score to 53 in 14 visits  to show significant improvement in function for progress to independent community ambulation and return to work                        Eval Status: 30                        Current Status:    PLAN  []  Upgrade activities as tolerated     []  Continue plan of care  []  Update interventions per flow sheet       []  Discharge due to:_  [x]  Other:_Trial        Sampson Casas PTA 6/1/2018  3:17 PM    Future Appointments  Date Time Provider Marcello Devine   6/4/2018 10:30 AM Judit Urrutia PTA [facility]   6/6/2018 9:30 AM Argenis Woody, PT [facility]   6/8/2018 10:00 AM Judit Urrutia, PTA [facility]   6/11/2018 4:00 PM Judit Urrutia PTA [facility]   6/13/2018 9:30 AM Argenis Woody, PT [facility]   6/15/2018 3:00 PM Argenis Woody PT [facility]   6/18/2018 1:00 PM Sampson Casas PTA [facility]   7/10/2018 2:45 PM Khai Cee  [address]

## 2021-06-29 ENCOUNTER — HOSPITAL ENCOUNTER (EMERGENCY)
Age: 41
Discharge: HOME | End: 2021-06-29
Attending: EMERGENCY MEDICINE
Payer: MEDICAID

## 2021-06-29 ENCOUNTER — APPOINTMENT (OUTPATIENT)
Dept: GENERAL RADIOLOGY | Age: 41
End: 2021-06-29
Attending: EMERGENCY MEDICINE
Payer: MEDICAID

## 2021-06-29 VITALS
RESPIRATION RATE: 18 BRPM | DIASTOLIC BLOOD PRESSURE: 87 MMHG | TEMPERATURE: 98.6 F | BODY MASS INDEX: 53.92 KG/M2 | SYSTOLIC BLOOD PRESSURE: 133 MMHG | HEART RATE: 64 BPM | OXYGEN SATURATION: 100 % | HEIGHT: 62 IN | WEIGHT: 293 LBS

## 2021-06-29 DIAGNOSIS — M17.11 PRIMARY OSTEOARTHRITIS OF RIGHT KNEE: Primary | ICD-10-CM

## 2021-06-29 DIAGNOSIS — M76.51 PATELLAR TENDONITIS OF RIGHT KNEE: ICD-10-CM

## 2021-06-29 PROCEDURE — 73564 X-RAY EXAM KNEE 4 OR MORE: CPT

## 2021-06-29 PROCEDURE — 99283 EMERGENCY DEPT VISIT LOW MDM: CPT

## 2021-06-29 RX ORDER — HYDROCODONE BITARTRATE AND ACETAMINOPHEN 5; 325 MG/1; MG/1
1 TABLET ORAL
Qty: 8 TABLET | Refills: 0 | Status: SHIPPED | OUTPATIENT
Start: 2021-06-29 | End: 2021-07-02

## 2021-06-29 RX ORDER — NAPROXEN 250 MG/1
250 TABLET ORAL 2 TIMES DAILY WITH MEALS
Qty: 10 TABLET | Refills: 0 | Status: SHIPPED | OUTPATIENT
Start: 2021-06-29 | End: 2021-07-04

## 2021-06-29 RX ORDER — METHYLPREDNISOLONE 4 MG/1
TABLET ORAL
Qty: 1 DOSE PACK | Refills: 0 | Status: SHIPPED | OUTPATIENT
Start: 2021-06-29

## 2021-06-29 NOTE — ED PROVIDER NOTES
EMERGENCY DEPARTMENT HISTORY AND PHYSICAL EXAM    8:05 AM    Date: 6/29/2021  Patient Name: Noel Shaw    History of Presenting Illness     Chief Complaint   Patient presents with    Leg Pain       History Provided By: Patient  Location/Duration/Severity/Modifying factors   Patient is a 44-year-old female with past medical history of sciatica herniated disc presenting to the emergency department with a chief complaint of right-sided knee pain as well as back pain. The back pain has been a chronic issue for going on for a long time and she has typically left-sided sciatica. She reports that over the past 2 weeks she has had increasing right-sided knee pain, located just inferior to the patella. Worsens with palpation and ambulation. She is been trying to rest but she works as a decorator. She is not tried anything so far otherwise other than [facility]  Patient denies any fevers or chills. Not having any chest pain no shortness of breath. Denies any popliteal or calf pain. Pain is isolated to the inferior aspect of the patella. Severity rated as moderate. No problems urinating, no saddle anesthesia. No incontinence of stool. No IV drug use history. PCP: Fady Crespo NP    Current Outpatient Medications   Medication Sig Dispense Refill    methylPREDNISolone (Medrol, Darrell,) 4 mg tablet Take per Dosepak instructions 1 Dose Pack 0    naproxen (NAPROSYN) 250 mg tablet Take 1 Tablet by mouth two (2) times daily (with meals) for 5 days. 10 Tablet 0    HYDROcodone-acetaminophen (Norco) 5-325 mg per tablet Take 1 Tablet by mouth every six (6) hours as needed (Severe Pain) for up to 3 days. Max Daily Amount: 4 Tablets. 8 Tablet 0    cephALEXin (KEFLEX) 500 mg capsule Take 500 mg by mouth four (4) times daily. (Patient not taking: Reported on 6/29/2021)      gabapentin (NEURONTIN) 300 mg capsule Take 1 Cap by mouth two (2) times a day.  Indications: Neuropathic Pain (Patient not taking: Reported on 6/29/2021) 60 Cap 1    methocarbamol (ROBAXIN) 500 mg tablet Take 1 Tab by mouth every twelve (12) hours as needed (muscle spasms). (Patient not taking: Reported on 6/29/2021) 30 Tab 1    triamcinolone acetonide (KENALOG) 0.1 % topical cream Apply  to affected area two (2) times daily as needed for Skin Irritation or Itching. Do not use more than 2 wks. Use good moisturizer too (Patient not taking: Reported on 6/29/2021) 45 g 1    furosemide (LASIX) 20 mg tablet Take 1 Tab by mouth daily. (Patient not taking: Reported on 6/29/2021) 7 Tab 0    Comp Stocking,Knee,Long,Medium misc As directed (Patient not taking: Reported on 6/29/2021) 1 Package 0    acetaminophen (TYLENOL) 325 mg tablet Take  by mouth every four (4) hours as needed for Pain. (Patient not taking: Reported on 6/29/2021)         Past History     Past Medical History:  Past Medical History:   Diagnosis Date    HNP (herniated nucleus pulposus), lumbar 2019    Spinal stenosis, lumbar 2019       Past Surgical History:  Past Surgical History:   Procedure Laterality Date    IR DRAIN CUTANEOUS/SUBCU ABSCESS      back       Family History:  Family History   Problem Relation Age of Onset    Stroke Mother 54    Hypertension Mother     High Cholesterol Mother     Hypertension Brother        Social History:  Social History     Tobacco Use    Smoking status: Never Smoker    Smokeless tobacco: Never Used   Substance Use Topics    Alcohol use: No     Alcohol/week: 0.0 standard drinks    Drug use: No       Allergies:  No Known Allergies    I reviewed and confirmed the above information with patient and updated as necessary. Review of Systems     Review of Systems   Constitutional: Negative for chills and fever. HENT: Negative for congestion, rhinorrhea, sinus pressure and sneezing. Eyes: Negative for visual disturbance. Respiratory: Negative for cough and shortness of breath.     Cardiovascular: Negative for chest pain. Gastrointestinal: Negative for abdominal pain, diarrhea, nausea and vomiting. Genitourinary: Negative for dysuria, frequency and urgency. Musculoskeletal: Positive for arthralgias (Right-sided knee pain), back pain and joint swelling (Right knee). Negative for neck pain. Skin: Negative for rash. Neurological: Negative for syncope, numbness and headaches. Physical Exam     Visit Vitals  /87   Pulse 64   Temp 98.6 °F (37 °C)   Resp 18   Ht 5' 2\" (1.575 m)   Wt 154.2 kg (340 lb)   SpO2 100%   BMI 62.19 kg/m²       Physical Exam  Vitals and nursing note reviewed. Constitutional:       General: She is not in acute distress. Appearance: Normal appearance. She is obese. She is not ill-appearing, toxic-appearing or diaphoretic. HENT:      Head: Normocephalic and atraumatic. Right Ear: External ear normal.      Left Ear: External ear normal.      Nose: Nose normal. No congestion. Mouth/Throat:      Mouth: Mucous membranes are moist.   Eyes:      Conjunctiva/sclera: Conjunctivae normal.      Pupils: Pupils are equal, round, and reactive to light. Cardiovascular:      Rate and Rhythm: Normal rate and regular rhythm. Pulses: Normal pulses. Heart sounds: Normal heart sounds. No murmur heard. Pulmonary:      Effort: Pulmonary effort is normal.      Breath sounds: Normal breath sounds. No wheezing, rhonchi or rales. Abdominal:      General: Abdomen is flat. Tenderness: There is no abdominal tenderness. There is no guarding or rebound. Musculoskeletal:         General: Swelling and tenderness present. Cervical back: Normal range of motion and neck supple. Right lower leg: No edema. Left lower leg: No edema. Comments: Right knee is modestly swollen as compared to the left side, there is no calf pain or popliteal pain.   Pain is isolated and tenderness is isolated to theInferior aspect of the patella in the prepatellar bursa in the upper portion of the patellar tendon. There is no midline spinal tenderness or tenderness elicited over the bony prominences of the lower back. Joint moves freely with passive range of motion. Skin:     General: Skin is warm and dry. Capillary Refill: Capillary refill takes less than 2 seconds. Findings: No rash. Neurological:      General: No focal deficit present. Mental Status: She is alert. Diagnostic Study Results     Labs -  No results found for this or any previous visit (from the past 24 hour(s)). Radiologic Studies -   XR KNEE RT MIN 4 V   Final Result   Findings/impression:      Mild tricompartment osteoarthritis. Question small knee effusion. No visualized   fracture. Medical Decision Making   I am the first provider for this patient. I reviewed the vital signs, available nursing notes, past medical history, past surgical history, family history and social history. Vital Signs-Reviewed the patient's vital signs. Records Reviewed: Nursing Notes, Old Medical Records, Previous Radiology Studies and Previous Laboratory Studies (Time of Review: 8:05 AM)    ED Course: Progress Notes, Reevaluation, and Consults:  ED Course as of Jun 29 1131   Tue Jun 29, 2021   0928 X-ray of the right knee demonstrates no fracture or dislocation. The joint spaces are preserved. Formal read is pending. [CLAUDIA]      ED Course User Index  [CLAUDIA] Edin Crews DO         Provider Notes (Medical Decision Making):   MDM  Number of Diagnoses or Management Options  Patellar tendonitis of right knee  Primary osteoarthritis of right knee  Diagnosis management comments: 44-year-old female presenting with right-sided knee pain. Is been going on for 2 weeks. She is on her feet a lot. Although she does have acute on chronic lower back pain. Her main concern today is her knee. No evidence of a septic arthritis.   Suspect likely prepatellar bursitis or patellar tendinitis given the location of the pain. The patient's symptoms are not concerning for DVT. No evidence of overlying cellulitis. We will get an x-ray to rule out fracture or significant arthritis/joint space narrowing. Declines pain medication at this time. Results reviewed: X-ray shows tricompartmental osteoarthritis. Small effusion. Plan will be to treat with a Medrol Dosepak, as well as medication for pain control. Will give orthopedic follow-up information for her to use the recommended return if she abruptly worsens or has any worsening in her condition including fever, increased pain or swelling or any symptoms that concern her. At this time, patient is stable and appropriate for discharge home.  Patient demonstrates understanding of current diagnoses and is in agreement with the treatment plan. Clarence Chassell are advised that while the likelihood of serious underlying condition is low at this point given the evaluation performed today, we cannot fully rule it out. Clarence Chassell are advised to immediately return with any new symptoms or worsening of current condition.  All questions have been answered. Fady Hurd is given educational material regarding their diagnoses, including danger symptoms and when to return to the ED. This note was dictated utilizing Dragon voice recognition software. Unfortunately this leads to occasional typographical errors. I apologize in advance if the situation occurs. If questions occur please do not hesitate to contact me directly. Lucinda Montes, DO          Procedures    Critical Care Time: 0    Diagnosis     Clinical Impression:   1. Primary osteoarthritis of right knee    2.  Patellar tendonitis of right knee        Disposition: Discharge    Follow-up Information     Follow up With Specialties Details Why Contact Info    Kimmy Villarreal NP Nurse Practitioner   [address]  [facility] [address]  [facility]  In 2 days  [phone] EMERGENCY DEPT Emergency Medicine  As needed, If symptoms worsen; or [facility] Emergency Department [address] [facility]  [phone]    Dorinda Combs MD Family Medicine   [address]       Dia Mcdermott MD Internal Medicine In 3 days PCP resource [phone]  In 3 days Primary Care Resource [address]  [phone]    [facility]   Primary Care Resource [address] [facility]  Floor 3  [address]  [phone]           Discharge Medication List as of 6/29/2021  9:57 AM      START taking these medications    Details   methylPREDNISolone (Medrol, Darrell,) 4 mg tablet Take per Dosepak instructions, Normal, Disp-1 Dose Pack, R-0      naproxen (NAPROSYN) 250 mg tablet Take 1 Tablet by mouth two (2) times daily (with meals) for 5 days. , Normal, Disp-10 Tablet, R-0      HYDROcodone-acetaminophen (Norco) 5-325 mg per tablet Take 1 Tablet by mouth every six (6) hours as needed (Severe Pain) for up to 3 days. Max Daily Amount: 4 Tablets., Normal, Disp-8 Tablet, R-0         CONTINUE these medications which have NOT CHANGED    Details   cephALEXin (KEFLEX) 500 mg capsule Take 500 mg by mouth four (4) times daily. , Historical Med      gabapentin (NEURONTIN) 300 mg capsule Take 1 Cap by mouth two (2) times a day. Indications: Neuropathic Pain, Normal, Disp-60 Cap, R-1      methocarbamol (ROBAXIN) 500 mg tablet Take 1 Tab by mouth every twelve (12) hours as needed (muscle spasms). , Normal, Disp-30 Tab, R-1      triamcinolone acetonide (KENALOG) 0.1 % topical cream Apply  to affected area two (2) times daily as needed for Skin Irritation or Itching. Do not use more than 2 wks.  Use good moisturizer too, Normal, Disp-45 g, R-1, ESTELLE      furosemide (LASIX) 20 mg tablet Take 1 Tab by mouth daily. , Normal, Disp-7 Tab, R-0      Comp Stocking,Knee,Long,Medium misc As directed, Normal, Disp-1 Package, R-0      acetaminophen (TYLENOL) 325 mg tablet Take  by mouth every four (4) hours as needed for Pain., [address]  Tera DO   Emergency Medicine   June 29, 2021, 8:05 AM     This note is dictated utilizing Dragon voice recognition software. Unfortunately this leads to occasional typographical errors using the voice recognition. I apologize in advance if the situation occurs. If questions occur please do not hesitate to contact me directly.     Jazz Becker, DO

## 2021-06-29 NOTE — ED NOTES
I have reviewed discharge instructions with the patient. The patient verbalized understanding. Patient armband removed and shredded. Current Discharge Medication List      START taking these medications    Details   methylPREDNISolone (Medrol, Darrell,) 4 mg tablet Take per Dosepak instructions  Qty: 1 Dose Pack, Refills: 0  Start date: 6/29/2021      naproxen (NAPROSYN) 250 mg tablet Take 1 Tablet by mouth two (2) times daily (with meals) for 5 days. Qty: 10 Tablet, Refills: 0  Start date: 6/29/2021, End date: 7/4/2021      HYDROcodone-acetaminophen (Norco) 5-325 mg per tablet Take 1 Tablet by mouth every six (6) hours as needed (Severe Pain) for up to 3 days. Max Daily Amount: 4 Tablets.   Qty: 8 Tablet, Refills: 0  Start date: 6/29/2021, End date: 7/2/2021    Associated Diagnoses: Patellar tendonitis of right knee

## 2021-06-29 NOTE — DISCHARGE INSTRUCTIONS
Follow-up with orthopedic surgery as well as one of the PCP resources. If you develop a fever, increasing pain or increasing swelling while taking the medication you should return for reassessment.

## 2021-12-28 ENCOUNTER — HOSPITAL ENCOUNTER (EMERGENCY)
Age: 41
Discharge: LEFT BEFORE DISCHARGE | End: 2021-12-28
Attending: EMERGENCY MEDICINE
Payer: MEDICAID

## 2021-12-28 VITALS
SYSTOLIC BLOOD PRESSURE: 153 MMHG | RESPIRATION RATE: 16 BRPM | DIASTOLIC BLOOD PRESSURE: 98 MMHG | TEMPERATURE: 98.9 F | HEART RATE: 95 BPM | OXYGEN SATURATION: 100 %

## 2021-12-28 PROCEDURE — 87636 SARSCOV2 & INF A&B AMP PRB: CPT

## 2021-12-28 PROCEDURE — 75810000275 HC EMERGENCY DEPT VISIT NO LEVEL OF CARE

## 2021-12-29 LAB
FLUAV RNA SPEC QL NAA+PROBE: NOT DETECTED
FLUBV RNA SPEC QL NAA+PROBE: NOT DETECTED
SARS-COV-2, COV2: DETECTED

## 2021-12-29 NOTE — ED TRIAGE NOTES
Pt.AOx4, steady gait, NAD, requesting covid test due to being at family event and exposure, denies symptoms

## 2022-03-18 PROBLEM — I87.2 VENOUS INSUFFICIENCY: Status: ACTIVE | Noted: 2019-05-21

## 2022-03-19 PROBLEM — M48.061 SPINAL STENOSIS OF LUMBAR REGION: Status: ACTIVE | Noted: 2019-05-21

## 2022-03-19 PROBLEM — M51.369 DDD (DEGENERATIVE DISC DISEASE), LUMBAR: Status: ACTIVE | Noted: 2018-06-26

## 2022-03-19 PROBLEM — M51.36 DDD (DEGENERATIVE DISC DISEASE), LUMBAR: Status: ACTIVE | Noted: 2018-06-26

## 2022-03-19 PROBLEM — E66.01 OBESITY, MORBID: Status: ACTIVE | Noted: 2018-03-30

## 2022-03-20 PROBLEM — M51.26 HNP (HERNIATED NUCLEUS PULPOSUS), LUMBAR: Status: ACTIVE | Noted: 2019-05-21

## 2024-09-18 ENCOUNTER — OFFICE VISIT (OUTPATIENT)
Dept: FAMILY MEDICINE CLINIC | Facility: CLINIC | Age: 44
End: 2024-09-18
Payer: MEDICAID

## 2024-09-18 VITALS
HEART RATE: 75 BPM | RESPIRATION RATE: 14 BRPM | HEIGHT: 67 IN | SYSTOLIC BLOOD PRESSURE: 124 MMHG | BODY MASS INDEX: 45.99 KG/M2 | TEMPERATURE: 98.2 F | WEIGHT: 293 LBS | OXYGEN SATURATION: 96 % | DIASTOLIC BLOOD PRESSURE: 80 MMHG

## 2024-09-18 DIAGNOSIS — M51.36 DDD (DEGENERATIVE DISC DISEASE), LUMBAR: ICD-10-CM

## 2024-09-18 DIAGNOSIS — K76.0 HEPATIC STEATOSIS: ICD-10-CM

## 2024-09-18 DIAGNOSIS — M51.26 HNP (HERNIATED NUCLEUS PULPOSUS), LUMBAR: ICD-10-CM

## 2024-09-18 DIAGNOSIS — D21.9 FIBROIDS: ICD-10-CM

## 2024-09-18 DIAGNOSIS — Z12.31 ENCOUNTER FOR SCREENING MAMMOGRAM FOR BREAST CANCER: ICD-10-CM

## 2024-09-18 DIAGNOSIS — Z00.00 ENCOUNTER FOR WELL ADULT EXAM WITHOUT ABNORMAL FINDINGS: Primary | ICD-10-CM

## 2024-09-18 DIAGNOSIS — S34.22XD INJURY OF SPINAL NERVE ROOT AT S1 LEVEL, SUBSEQUENT ENCOUNTER: ICD-10-CM

## 2024-09-18 PROBLEM — S34.22XA INJURY OF SPINAL NERVE ROOT AT S1 LEVEL: Status: ACTIVE | Noted: 2024-09-18

## 2024-09-18 PROCEDURE — 99386 PREV VISIT NEW AGE 40-64: CPT | Performed by: STUDENT IN AN ORGANIZED HEALTH CARE EDUCATION/TRAINING PROGRAM

## 2024-09-18 SDOH — ECONOMIC STABILITY: FOOD INSECURITY: WITHIN THE PAST 12 MONTHS, THE FOOD YOU BOUGHT JUST DIDN'T LAST AND YOU DIDN'T HAVE MONEY TO GET MORE.: NEVER TRUE

## 2024-09-18 SDOH — ECONOMIC STABILITY: INCOME INSECURITY: HOW HARD IS IT FOR YOU TO PAY FOR THE VERY BASICS LIKE FOOD, HOUSING, MEDICAL CARE, AND HEATING?: NOT HARD AT ALL

## 2024-09-18 SDOH — ECONOMIC STABILITY: FOOD INSECURITY: WITHIN THE PAST 12 MONTHS, YOU WORRIED THAT YOUR FOOD WOULD RUN OUT BEFORE YOU GOT MONEY TO BUY MORE.: NEVER TRUE

## 2024-09-18 ASSESSMENT — ENCOUNTER SYMPTOMS
NAUSEA: 0
VOMITING: 0
CONSTIPATION: 0
ABDOMINAL PAIN: 0
BACK PAIN: 0
COUGH: 0
SHORTNESS OF BREATH: 0
RHINORRHEA: 0
DIARRHEA: 0
SORE THROAT: 0
CHEST TIGHTNESS: 0
EYE PAIN: 0

## 2024-09-18 ASSESSMENT — PATIENT HEALTH QUESTIONNAIRE - PHQ9
SUM OF ALL RESPONSES TO PHQ QUESTIONS 1-9: 0
SUM OF ALL RESPONSES TO PHQ QUESTIONS 1-9: 0
2. FEELING DOWN, DEPRESSED OR HOPELESS: NOT AT ALL
SUM OF ALL RESPONSES TO PHQ QUESTIONS 1-9: 0
1. LITTLE INTEREST OR PLEASURE IN DOING THINGS: NOT AT ALL
SUM OF ALL RESPONSES TO PHQ QUESTIONS 1-9: 0
SUM OF ALL RESPONSES TO PHQ9 QUESTIONS 1 & 2: 0

## 2024-09-23 ENCOUNTER — HOSPITAL ENCOUNTER (OUTPATIENT)
Facility: HOSPITAL | Age: 44
Setting detail: RECURRING SERIES
Discharge: HOME | End: 2024-09-26
Payer: MEDICAID

## 2024-09-23 PROCEDURE — 97163 PT EVAL HIGH COMPLEX 45 MIN: CPT

## 2024-10-07 ENCOUNTER — HOSPITAL ENCOUNTER (OUTPATIENT)
Facility: HOSPITAL | Age: 44
Setting detail: RECURRING SERIES
Discharge: HOME | End: 2024-10-10
Payer: MEDICAID

## 2024-10-07 PROCEDURE — 97110 THERAPEUTIC EXERCISES: CPT

## 2024-10-07 PROCEDURE — 97112 NEUROMUSCULAR REEDUCATION: CPT

## 2024-10-07 PROCEDURE — 97535 SELF CARE MNGMENT TRAINING: CPT

## 2024-10-07 NOTE — PROGRESS NOTES
PHYSICAL / OCCUPATIONAL THERAPY - DAILY TREATMENT NOTE (updated )    Patient Name: Shay Alvarado    Date: 10/7/2024    : 1980  [facility] / Plan: [facility] PLAN CARDINAL CARE / Product Type: *No Product type* /      Patient  verified yes     Visit #   Current / Total 2 16   Time   In / Out 1:39 2:17   Pain   In / Out 4/10 4/10   Subjective Functional Status/Changes: Says no changes since IE. Still feeling pain in the back. Not going down the left leg. Notes pain coming up to the L side of the mid back today. Reports she has a fibroid the size of a babies head in her uterus and has to go back to the MD to discuss what to do about that on 10/29.     TREATMENT AREA =  Other low back pain [M54.59]  Other intervertebral disc degeneration, lumbar region [M51.36]    OBJECTIVE    Therapeutic Procedures:  Tx Min Billable or 1:1 Min (if diff from Tx Min) Procedure, Rationale, Specifics   15  39999 Therapeutic Exercise (timed):  increase ROM, strength, coordination, balance, and proprioception to improve patient's ability to progress to PLOF and address remaining functional goals. (see flow sheet as applicable)     Details if applicable:     15  14365 Neuromuscular Re-Education (timed):  improve balance, coordination, kinesthetic sense, posture, core stability and proprioception to improve patient's ability to develop conscious control of individual muscles and awareness of position of extremities in order to progress to PLOF and address remaining functional goals. (see flow sheet as applicable)     Details if applicable:       77370 Manual Therapy (timed):  decrease pain, increase ROM, increase tissue extensibility, and decrease trigger points to improve patient's ability to progress to PLOF and address remaining functional goals.  The manual therapy interventions were performed at a separate and distinct time from the therapeutic activities interventions . (see flow sheet 
74y f pmh metastatic breast ca, Lumpectomy x2, RT, chemo, Ex smoker, Asthma, DVT ( on Eliquis), HTN, Osteoarthritis recent L fem fx s/p repair p/w fall. Pt reports she was at home, her L leg gave out fell, she fell on bathroom floor hit R side of body.  Unable to get up x1hr. Now reporting pain to R arm LLE and chest. Unable to ambulate. Denies head trauma, LOC. no prodrome before fall. Denies HA, CP, SOB, N/V,  vision change, loss of sensation, abd pain.     R shoulder xray 10/10-Acute displaced right midshaft humerus fracture with apex anteromedial angulation of the distal fracture fragment and small degree of foreshortening  L hip xray 10/10-Diffuse lytic and sclerotic bone lesions in the pelvis, similar in appearance when compared to prior CT abdomen and pelvis, and consistent with bone metastases. Chronic left femoral shaft fracture with callus formation. Questionable lucent line in the old fracture on lateral view could be secondary to overlying tissue/structures.  If clinical concern for acute on chronic fracture, can obtain CT for further evaluation.  L femur xray 10/10-Chronic proximal femoral shaft fracture with surrounding callus formation and heterotopic ossification. Femoral fixation hardware noted. Patchy lucencies along the anterolateral femoral cortex of uncertain etiology. Chronic degenerative changes of the hip and knee joints.
Pt is a 75 y/o F, PMH metastatic breast ca, Lumpectomy x2, RT, chemo, Ex smoker, Asthma, DVT ( on Eliquis), HTN, Osteoarthritis recent L fem fx s/p repair p/w fall. Pt reports she was at home, her L leg gave out fell, she fell on bathroom floor hit R side of body. Unable to get up x1hr. Patient states known history of diffuse metastatic breast ca for which she sees outpatient. She has a bone scan in May which demonstrated diffuse met dz. Including R humerus and spine. Denies HH/LOC. Denies any numbness or tingling. Reports some mild back pain at baseline. Hx of pseudogout diagnosed at [facility]. No other orthopedic concerns at this time. Pt with R path humerus fx, per ortho s/p closed reduction was performed and a well molded, well padded coaptation plaster splint was applied. COLIN SALEEM. Pt with metastatic breast ca to spine, per ortho WBAT. CT cervical spine: Diffuse lytic or blastic changes to the visualized cervical thoracic vertebral bodies and skull base. No evidence of acute fracture or dislocation. CT chest: Chronic right sixth, seventh and ninth rib fractures. Extensive destructive bony changes of the posterior right 10th rib. No associated pneumothorax. Diffuse sclerotic and lytic bony metastatic disease of the visualized axial and appendicular skeleton, appears to have progressed since prior study on 7/22/2022. Irregularly-shaped 7 mm right apical nodule which appears more well-defined when compared to recent CT scan. New small right pleural effusion. Hepatic metastases. Pt now s/p R humerus IMN on 10/14/22

## 2024-10-09 ENCOUNTER — HOSPITAL ENCOUNTER (OUTPATIENT)
Facility: HOSPITAL | Age: 44
Setting detail: RECURRING SERIES
End: 2024-10-09
Payer: MEDICAID

## 2024-10-11 ENCOUNTER — HOSPITAL ENCOUNTER (OUTPATIENT)
Facility: HOSPITAL | Age: 44
Setting detail: RECURRING SERIES
Discharge: HOME | End: 2024-10-14
Payer: MEDICAID

## 2024-10-11 PROCEDURE — 97112 NEUROMUSCULAR REEDUCATION: CPT

## 2024-10-11 PROCEDURE — 97110 THERAPEUTIC EXERCISES: CPT

## 2024-10-11 PROCEDURE — 97530 THERAPEUTIC ACTIVITIES: CPT

## 2024-10-11 NOTE — PROGRESS NOTES
PHYSICAL / OCCUPATIONAL THERAPY - DAILY TREATMENT NOTE (updated )    Patient Name: Shay Alvarado    Date: 10/11/2024    : 1980  Insurance: Payor: [facility] MEDICAID / Plan: [facility] CARDINAL CARE / Product Type: *No Product type* /      Patient  verified yes     Visit #   Current / Total 3 16   Time   In / Out 7:43 8:30   Pain   In / Out 3/10 4/10   Subjective Functional Status/Changes: Says she did the exercises on Tuesday and those were good. Denies muscle soreness. Says she is feeling pretty good today. Denies pain down the leg.     TREATMENT AREA =  Other low back pain [M54.59]  Other intervertebral disc degeneration, lumbar region [M51.36]    OBJECTIVE    Therapeutic Procedures:  Tx Min Billable or 1:1 Min (if diff from Tx Min) Procedure, Rationale, Specifics   15  62074 Therapeutic Exercise (timed):  increase ROM, strength, coordination, balance, and proprioception to improve patient's ability to progress to PLOF and address remaining functional goals. (see flow sheet as applicable)     Details if applicable:     8  96781 Neuromuscular Re-Education (timed):  improve balance, coordination, kinesthetic sense, posture, core stability and proprioception to improve patient's ability to develop conscious control of individual muscles and awareness of position of extremities in order to progress to PLOF and address remaining functional goals. (see flow sheet as applicable)     Details if applicable:     6  20461 Manual Therapy (timed):  decrease pain, increase ROM, increase tissue extensibility, and decrease trigger points to improve patient's ability to progress to PLOF and address remaining functional goals.  The manual therapy interventions were performed at a separate and distinct time from the therapeutic activities interventions . (see flow sheet as applicable)     Details if applicable:  MFR and STM along L QL and lumbar paraspinals   8  06474 Therapeutic Activity (timed):  use

## 2024-10-14 ENCOUNTER — HOSPITAL ENCOUNTER (OUTPATIENT)
Facility: HOSPITAL | Age: 44
Setting detail: RECURRING SERIES
Discharge: HOME | End: 2024-10-17
Payer: MEDICAID

## 2024-10-14 PROCEDURE — 97110 THERAPEUTIC EXERCISES: CPT

## 2024-10-14 PROCEDURE — 97112 NEUROMUSCULAR REEDUCATION: CPT

## 2024-10-14 PROCEDURE — 97530 THERAPEUTIC ACTIVITIES: CPT

## 2024-10-14 NOTE — PROGRESS NOTES
PHYSICAL / OCCUPATIONAL THERAPY - DAILY TREATMENT NOTE (updated )    Patient Name: Shay Alvarado    Date: 10/14/2024    : 1980  [facility] / Plan: [facility] PLAN CARDINAL CARE / Product Type: *No Product type* /      Patient  verified yes     Visit #   Current / Total 4 16   Time   In / Out 1:47 2:15   Pain   In / Out 5/10 5/10   Subjective Functional Status/Changes: Reports more pain because she has been moving a lot this morning. Says the heat helped a lot after last visit. Asks about aquatic therapy.     TREATMENT AREA =  Other low back pain [M54.59]  Other intervertebral disc degeneration, lumbar region [M51.36]    OBJECTIVE    Therapeutic Procedures:  Tx Min Billable or 1:1 Min (if diff from Tx Min) Procedure, Rationale, Specifics   12  42401 Therapeutic Exercise (timed):  increase ROM, strength, coordination, balance, and proprioception to improve patient's ability to progress to PLOF and address remaining functional goals. (see flow sheet as applicable)     Details if applicable:     8  14105 Neuromuscular Re-Education (timed):  improve balance, coordination, kinesthetic sense, posture, core stability and proprioception to improve patient's ability to develop conscious control of individual muscles and awareness of position of extremities in order to progress to PLOF and address remaining functional goals. (see flow sheet as applicable)     Details if applicable:       42395 Manual Therapy (timed):  decrease pain, increase ROM, increase tissue extensibility, and decrease trigger points to improve patient's ability to progress to PLOF and address remaining functional goals.  The manual therapy interventions were performed at a separate and distinct time from the therapeutic activities interventions . (see flow sheet as applicable)     Details if applicable:  MFR and STM along L QL and lumbar paraspinals   8  25514 Therapeutic Activity (timed):  use of

## 2024-10-16 ENCOUNTER — APPOINTMENT (OUTPATIENT)
Facility: HOSPITAL | Age: 44
End: 2024-10-16
Payer: MEDICAID

## 2024-10-16 ENCOUNTER — HOSPITAL ENCOUNTER (OUTPATIENT)
Facility: HOSPITAL | Age: 44
Setting detail: RECURRING SERIES
Discharge: HOME | End: 2024-10-19
Payer: MEDICAID

## 2024-10-16 PROCEDURE — 97113 AQUATIC THERAPY/EXERCISES: CPT

## 2024-10-16 NOTE — PROGRESS NOTES
PHYSICAL / OCCUPATIONAL THERAPY - DAILY TREATMENT NOTE (updated )    Patient Name: Shay Alvarado    Date: 10/16/2024    : 1980  [facility] / Plan: [facility] PLAN CARDINAL CARE / Product Type: *No Product type* /      Patient  verified yes     Visit #   Current / Total 5 16   Time   In / Out 300 330   Pain   In / Out 3/10    Subjective Functional Status/Changes: Reports pain isn't bad today, but she \"didn't do a lot\" today.     TREATMENT AREA =  Other low back pain [M54.59]  Other intervertebral disc degeneration, lumbar region [M51.36]    OBJECTIVE    Therapeutic Procedures:  Tx Min Billable or 1:1 Min (if diff from Tx Min) Procedure, Rationale, Specifics     70583 Therapeutic Exercise (timed):  increase ROM, strength, coordination, balance, and proprioception to improve patient's ability to progress to PLOF and address remaining functional goals. (see flow sheet as applicable)     Details if applicable:       46903 Neuromuscular Re-Education (timed):  improve balance, coordination, kinesthetic sense, posture, core stability and proprioception to improve patient's ability to develop conscious control of individual muscles and awareness of position of extremities in order to progress to PLOF and address remaining functional goals. (see flow sheet as applicable)     Details if applicable:       64088 Manual Therapy (timed):  decrease pain, increase ROM, increase tissue extensibility, and decrease trigger points to improve patient's ability to progress to PLOF and address remaining functional goals.  The manual therapy interventions were performed at a separate and distinct time from the therapeutic activities interventions . (see flow sheet as applicable)     Details if applicable:  MFR and STM along L QL and lumbar paraspinals   30  68548 Aquatic Therapy (timed):  decrease pain, improve strength, flexibility, and range of motion using the buoyancy, thermal

## 2024-10-17 ENCOUNTER — HOSPITAL ENCOUNTER (OUTPATIENT)
Facility: HOSPITAL | Age: 44
Setting detail: SPECIMEN
Discharge: HOME | End: 2024-10-20

## 2024-10-17 LAB
A/G RATIO: 1.8 RATIO (ref 1.1–2.6)
ALBUMIN: 4.4 G/DL (ref 3.5–5)
ALP BLD-CCNC: 68 U/L (ref 25–115)
ALT SERPL-CCNC: 18 U/L (ref 5–40)
ANION GAP SERPL CALCULATED.3IONS-SCNC: 9 MMOL/L (ref 3–15)
AST SERPL-CCNC: 14 U/L (ref 10–37)
BASOPHILS ABSOLUTE: 0.1 K/UL (ref 0–0.2)
BASOPHILS RELATIVE PERCENT: 1 % (ref 0–2)
BILIRUB SERPL-MCNC: 0.1 MG/DL (ref 0.2–1.2)
BUN BLDV-MCNC: 8 MG/DL (ref 6–22)
CALCIUM SERPL-MCNC: 9.2 MG/DL (ref 8.4–10.5)
CHLORIDE BLD-SCNC: 104 MMOL/L (ref 98–110)
CHOLESTEROL, TOTAL: 171 MG/DL (ref 110–200)
CHOLESTEROL/HDL RATIO: 3.5 (ref 0–5)
CO2: 28 MMOL/L (ref 20–32)
CREAT SERPL-MCNC: 0.5 MG/DL (ref 0.5–1.2)
EOSINOPHIL # BLD: 2 % (ref 0–6)
EOSINOPHILS ABSOLUTE: 0.1 K/UL (ref 0–0.5)
ESTIMATED AVERAGE GLUCOSE: 120 MG/DL (ref 91–123)
FERRITIN: 25 NG/ML (ref 10–291)
FOLATE: >20 NG/ML
GFR, ESTIMATED: >60 ML/MIN/1.73 SQ.M.
GLOBULIN: 2.5 G/DL (ref 2–4)
GLUCOSE: 93 MG/DL (ref 70–99)
HBA1C MFR BLD: 5.8 % (ref 4.8–5.6)
HCT VFR BLD CALC: 36.9 % (ref 35.1–46.5)
HDLC SERPL-MCNC: 49 MG/DL
HEMOGLOBIN: 11.1 G/DL (ref 11.7–15.5)
IRON % SATURATION: 16 % (ref 20–50)
IRON: 52 MCG/DL (ref 30–160)
LDL CHOLESTEROL: 107 MG/DL (ref 50–99)
LDL/HDL RATIO: 2.2
LYMPHOCYTES # BLD: 41 % (ref 20–45)
LYMPHOCYTES ABSOLUTE: 1.9 K/UL (ref 1–4.8)
MCH RBC QN AUTO: 24 PG (ref 26–34)
MCHC RBC AUTO-ENTMCNC: 30 G/DL (ref 31–36)
MCV RBC AUTO: 79 FL (ref 80–99)
MONOCYTES ABSOLUTE: 0.4 K/UL (ref 0.1–1)
MONOCYTES: 7 % (ref 3–12)
NEUTROPHILS ABSOLUTE: 2.3 K/UL (ref 1.8–7.7)
NEUTROPHILS: 49 % (ref 40–75)
NON-HDL CHOLESTEROL: 122 MG/DL
PDW BLD-RTO: 15.3 % (ref 10–15.5)
PLATELET # BLD: 422 K/UL (ref 140–440)
PMV BLD AUTO: 10.1 FL (ref 9–13)
POTASSIUM SERPL-SCNC: 4.5 MMOL/L (ref 3.5–5.5)
RBC # BLD: 4.67 M/UL (ref 3.8–5.2)
SODIUM BLD-SCNC: 141 MMOL/L (ref 133–145)
TOTAL IRON BINDING CAPACITY: 326 MCG/DL (ref 228–428)
TOTAL PROTEIN: 6.9 G/DL (ref 6.4–8.3)
TRIGL SERPL-MCNC: 75 MG/DL (ref 40–149)
TSH SERPL DL<=0.05 MIU/L-ACNC: 3.29 MCU/ML (ref 0.27–4.2)
UIBC: 274 MCG/DL (ref 110–370)
VITAMIN B-12: 386 PG/ML (ref 211–911)
VLDLC SERPL CALC-MCNC: 15 MG/DL (ref 8–30)
WBC # BLD: 4.8 K/UL (ref 4–11)

## 2024-10-18 DIAGNOSIS — D50.0 IRON DEFICIENCY ANEMIA DUE TO CHRONIC BLOOD LOSS: Primary | ICD-10-CM

## 2024-10-18 RX ORDER — FERROUS SULFATE 325(65) MG
325 TABLET ORAL
Qty: 90 TABLET | Refills: 1 | Status: SHIPPED | OUTPATIENT
Start: 2024-10-18

## 2024-10-21 ENCOUNTER — HOSPITAL ENCOUNTER (OUTPATIENT)
Facility: HOSPITAL | Age: 44
Setting detail: RECURRING SERIES
Discharge: HOME | End: 2024-10-24
Payer: MEDICAID

## 2024-10-21 PROCEDURE — 97110 THERAPEUTIC EXERCISES: CPT

## 2024-10-21 PROCEDURE — 97112 NEUROMUSCULAR REEDUCATION: CPT

## 2024-10-21 PROCEDURE — 97530 THERAPEUTIC ACTIVITIES: CPT

## 2024-10-21 NOTE — PROGRESS NOTES
PHYSICAL / OCCUPATIONAL THERAPY - DAILY TREATMENT NOTE (updated )    Patient Name: Shay Alvarado    Date: 10/21/2024    : 1980  [facility] / Plan: [facility] PLAN CARDINAL CARE / Product Type: *No Product type* /      Patient  verified yes     Visit #   Current / Total 6 16   Time   In / Out 1:45 2:20   Pain   In / Out 310 0/10   Subjective Functional Status/Changes: Says she doesn't really have any pain, but says maybe 3/10. Said she liked the pool. Was tired after, but felt good.      TREATMENT AREA =  Other low back pain [M54.59]  Other intervertebral disc degeneration, lumbar region [M51.36]    OBJECTIVE    Therapeutic Procedures:  Tx Min Billable or 1:1 Min (if diff from Tx Min) Procedure, Rationale, Specifics   17  16860 Therapeutic Exercise (timed):  increase ROM, strength, coordination, balance, and proprioception to improve patient's ability to progress to PLOF and address remaining functional goals. (see flow sheet as applicable)     Details if applicable:     8  59161 Therapeutic Activity (timed):  use of dynamic activities replicating functional movements to increase ROM, strength, coordination, balance, and proprioception in order to improve patient's ability to progress to PLOF and address remaining functional goals.  (see flow sheet as applicable)      10  02594 Neuromuscular Re-Education (timed):  improve balance, coordination, kinesthetic sense, posture, core stability and proprioception to improve patient's ability to develop conscious control of individual muscles and awareness of position of extremities in order to progress to PLOF and address remaining functional goals. (see flow sheet as applicable)     Details if applicable:       97400 Manual Therapy (timed):  decrease pain, increase ROM, increase tissue extensibility, and decrease trigger points to improve patient's ability to progress to PLOF and address remaining functional goals.  The

## 2024-10-23 ENCOUNTER — HOSPITAL ENCOUNTER (OUTPATIENT)
Facility: HOSPITAL | Age: 44
Setting detail: RECURRING SERIES
Discharge: HOME | End: 2024-10-26
Payer: MEDICAID

## 2024-10-23 ENCOUNTER — APPOINTMENT (OUTPATIENT)
Facility: HOSPITAL | Age: 44
End: 2024-10-23
Payer: MEDICAID

## 2024-10-23 PROCEDURE — 97113 AQUATIC THERAPY/EXERCISES: CPT

## 2024-10-23 NOTE — PROGRESS NOTES
[facility]  [address] [facility] [address] [address] - Ph: [phone]  Fx: [phone]  PHYSICAL THERAPY PROGRESS NOTE  Patient Name: Shay Alvarado : 1980   Treatment/Medical Diagnosis: Other low back pain [M54.59]  Other intervertebral disc degeneration, lumbar region [M51.36]   Referral Source: Guevara Riojas DO     Date of Initial Visit: 24 Attended Visits: 7 Missed Visits: 0     SUMMARY OF TREATMENT  Pt was seen for IE and 6 f/u visits with treatment consisting of therapeutic exercise, therapeutic activity, neuromuscular activity, manual therapy, activity modification/progression, and Pt ED to improve ROM and tolerance to normal ADLs along with instruction of HEP.  CURRENT STATUS  Pt has made some progress in PT as demonstrated by decreased max pain levels at 6/10 and average pain level of 2-3/10 (reports varies from different activities). Pt reports 60% improvement since beginning therapy. Pt also notes that on her last visit she used the stairs to leave with no symptom increase. She notes that pain has not increased since beginning therapy, but also indicates that she has not attempted to do the things that make it worse. Pt notes they are completing their HEP at least once a day. Pt will continue to benefit from skilled PT to progress exercises and improve upon pain management skills and increase tolerance to exercise.    Independent with HEP to progress to meet goals.   Status at last Eval: established  Current Status: Reporting compliance  Goal Met?  yes    2.  Pt to report decreased max pain levels less than 6/10 for improvement in ADLs.   Status at last Eval: 7/10  Current Status: 6/10  Goal Met?   Progressing    3. Pt to report 2+ on GROC to signify some functional improvement   Status at last Eval: established  Current Status: +3  Goal Met?  yes    Payor: [facility] MEDICAID / Plan: [facility] COMMUNITY PLAN

## 2024-10-23 NOTE — PROGRESS NOTES
PHYSICAL / OCCUPATIONAL THERAPY - DAILY TREATMENT NOTE (updated )    Patient Name: Shay Alvarado    Date: 10/23/2024    : 1980  [facility] / Plan: [facility] CARDINAL CARE / Product Type: *No Product type* /      Patient  verified yes     Visit #   Current / Total 7 16   Time   In / Out 300 330   Pain   In / Out 2/10 2/10   Subjective Functional Status/Changes: Says that she doesn't have much pain today, however she hasn't done much for the pain to get worse.     TREATMENT AREA =  Other low back pain [M54.59]  Other intervertebral disc degeneration, lumbar region [M51.36]    OBJECTIVE    Therapeutic Procedures:  Tx Min Billable or 1:1 Min (if diff from Tx Min) Procedure, Rationale, Specifics     64323 Therapeutic Exercise (timed):  increase ROM, strength, coordination, balance, and proprioception to improve patient's ability to progress to PLOF and address remaining functional goals. (see flow sheet as applicable)     Details if applicable:       84866 Therapeutic Activity (timed):  use of dynamic activities replicating functional movements to increase ROM, strength, coordination, balance, and proprioception in order to improve patient's ability to progress to PLOF and address remaining functional goals.  (see flow sheet as applicable)        57648 Neuromuscular Re-Education (timed):  improve balance, coordination, kinesthetic sense, posture, core stability and proprioception to improve patient's ability to develop conscious control of individual muscles and awareness of position of extremities in order to progress to PLOF and address remaining functional goals. (see flow sheet as applicable)     Details if applicable:       48624 Manual Therapy (timed):  decrease pain, increase ROM, increase tissue extensibility, and decrease trigger points to improve patient's ability to progress to PLOF and address remaining functional goals.  The manual therapy

## 2024-10-25 ENCOUNTER — TELEPHONE (OUTPATIENT)
Facility: HOSPITAL | Age: 44
End: 2024-10-25

## 2024-10-25 NOTE — TELEPHONE ENCOUNTER
pt called on 10/25 to R/S her 10/30 aqua appt to 10/28 as she has a  to attend & to R/S her10/28 land appt to 10/31.

## 2024-10-28 ENCOUNTER — APPOINTMENT (OUTPATIENT)
Facility: HOSPITAL | Age: 44
End: 2024-10-28
Payer: MEDICAID

## 2024-10-28 ENCOUNTER — HOSPITAL ENCOUNTER (OUTPATIENT)
Facility: HOSPITAL | Age: 44
Setting detail: RECURRING SERIES
Discharge: HOME | End: 2024-10-31
Payer: MEDICAID

## 2024-10-28 PROCEDURE — 97113 AQUATIC THERAPY/EXERCISES: CPT

## 2024-10-28 NOTE — PROGRESS NOTES
PHYSICAL / OCCUPATIONAL THERAPY - DAILY TREATMENT NOTE (updated )    Patient Name: Shay Alvarado    Date: 10/28/2024    : 1980  [facility] / Plan: [facility] CARDINAL CARE / Product Type: *No Product type* /      Patient  verified yes     Visit #   Current / Total 8 16   Time   In / Out 200 230   Pain   In / Out 4/10 4/10   Subjective Functional Status/Changes: Says that she did more activity today and has more pain.  Says no change in symptoms after last visit.     TREATMENT AREA =  Other low back pain [M54.59]  Other intervertebral disc degeneration, lumbar region [M51.36]    OBJECTIVE    Therapeutic Procedures:  Tx Min Billable or 1:1 Min (if diff from Tx Min) Procedure, Rationale, Specifics     81375 Therapeutic Exercise (timed):  increase ROM, strength, coordination, balance, and proprioception to improve patient's ability to progress to PLOF and address remaining functional goals. (see flow sheet as applicable)     Details if applicable:       12266 Therapeutic Activity (timed):  use of dynamic activities replicating functional movements to increase ROM, strength, coordination, balance, and proprioception in order to improve patient's ability to progress to PLOF and address remaining functional goals.  (see flow sheet as applicable)        16552 Neuromuscular Re-Education (timed):  improve balance, coordination, kinesthetic sense, posture, core stability and proprioception to improve patient's ability to develop conscious control of individual muscles and awareness of position of extremities in order to progress to PLOF and address remaining functional goals. (see flow sheet as applicable)     Details if applicable:       14457 Manual Therapy (timed):  decrease pain, increase ROM, increase tissue extensibility, and decrease trigger points to improve patient's ability to progress to PLOF and address remaining functional goals.  The manual therapy

## 2024-10-30 ENCOUNTER — APPOINTMENT (OUTPATIENT)
Facility: HOSPITAL | Age: 44
End: 2024-10-30
Payer: MEDICAID

## 2024-10-31 ENCOUNTER — HOSPITAL ENCOUNTER (OUTPATIENT)
Facility: HOSPITAL | Age: 44
Setting detail: RECURRING SERIES
Discharge: HOME | End: 2024-11-03
Payer: MEDICAID

## 2024-10-31 PROCEDURE — 97110 THERAPEUTIC EXERCISES: CPT

## 2024-10-31 PROCEDURE — 97112 NEUROMUSCULAR REEDUCATION: CPT

## 2024-10-31 PROCEDURE — 97530 THERAPEUTIC ACTIVITIES: CPT

## 2024-10-31 NOTE — PROGRESS NOTES
PHYSICAL / OCCUPATIONAL THERAPY - DAILY TREATMENT NOTE (updated )    Patient Name: Shay Alvarado    Date: 10/31/2024    : 1980  [facility] / [facility] [facility] [place] [place] / Product Type: *No Product type* /      Patient  verified yes     Visit #   Current / Total 9 16   Time   In / Out 12:31 1:01   Pain   In / Out 3/10 3/10   Subjective Functional Status/Changes: Says she isn't in very much pain today. Doing pretty good.     TREATMENT AREA =  Other low back pain [M54.59]  Other intervertebral disc degeneration, lumbar region [M51.36]    OBJECTIVE    Therapeutic Procedures:  Tx Min Billable or 1:1 Min (if diff from Tx Min) Procedure, Rationale, Specifics   12  24882 Therapeutic Exercise (timed):  increase ROM, strength, coordination, balance, and proprioception to improve patient's ability to progress to PLOF and address remaining functional goals. (see flow sheet as applicable)     Details if applicable:     10  88458 Therapeutic Activity (timed):  use of dynamic activities replicating functional movements to increase ROM, strength, coordination, balance, and proprioception in order to improve patient's ability to progress to PLOF and address remaining functional goals.  (see flow sheet as applicable)      8  09238 Neuromuscular Re-Education (timed):  improve balance, coordination, kinesthetic sense, posture, core stability and proprioception to improve patient's ability to develop conscious control of individual muscles and awareness of position of extremities in order to progress to PLOF and address remaining functional goals. (see flow sheet as applicable)     Details if applicable:       86168 Manual Therapy (timed):  decrease pain, increase ROM, increase tissue extensibility, and decrease trigger points to improve patient's ability to progress to PLOF and address remaining functional goals.  The manual therapy interventions were performed at a separate  and distinct time from the therapeutic activities interventions . (see flow sheet as applicable)     Details if applicable:  MFR and STM along L QL and lumbar paraspinals     17967 Aquatic Therapy (timed):  decrease pain, improve strength, flexibility, and range of motion using the buoyancy, thermal properties and consistent resistance of the water to improve patient's ability to progress to PLOF and address remaining functional goals.  (see flow sheet as applicable)    Details if applicable:     30  MC BC Totals Reminder: bill using total billable min of TIMED therapeutic procedures (example: do not include dry needle or estim unattended, both untimed codes, in totals to left)  8-22 min = 1 unit; 23-37 min = 2 units; 38-52 min = 3 units; 53-67 min = 4 units; 68-82 min = 5 units   Total Total     [x]  Patient Education billed concurrently with other procedures   [x] Review HEP    [x] Progressed/Changed HEP, detail:    [] Other detail:       Objective Information/Functional Measures/Assessment    Pt 10min late to session. Agreed to modified session.   Discussed continuing with POC for 6 more visit per insurance and seeing what progress can be made in that time. Discussed getting back to activities that usually would increase LBP so we can try to problem solve this.   Exercises per flow sheet. Plan to add more standing exercises each session. Lots of cueing today with elevated sit to stands with pt reporting small increase in LBP during exercise.    Patient will continue to benefit from skilled PT / OT services to modify and progress therapeutic interventions, analyze and address functional mobility deficits, analyze and address ROM deficits, analyze and address strength deficits, analyze and address soft tissue restrictions, analyze and cue for proper movement patterns, analyze and modify for postural abnormalities, and instruct in home and community integration to address functional deficits and attain remaining

## 2024-11-04 ENCOUNTER — APPOINTMENT (OUTPATIENT)
Facility: HOSPITAL | Age: 44
End: 2024-11-04
Payer: MEDICAID

## 2024-11-04 ENCOUNTER — TELEPHONE (OUTPATIENT)
Facility: HOSPITAL | Age: 44
End: 2024-11-04

## 2024-11-06 ENCOUNTER — HOSPITAL ENCOUNTER (OUTPATIENT)
Facility: HOSPITAL | Age: 44
Setting detail: RECURRING SERIES
Discharge: HOME | End: 2024-11-09
Payer: MEDICAID

## 2024-11-06 PROCEDURE — 97530 THERAPEUTIC ACTIVITIES: CPT

## 2024-11-06 PROCEDURE — 97110 THERAPEUTIC EXERCISES: CPT

## 2024-11-06 PROCEDURE — 97112 NEUROMUSCULAR REEDUCATION: CPT

## 2024-11-06 NOTE — PROGRESS NOTES
activities interventions . (see flow sheet as applicable)     Details if applicable:  MFR and STM along L QL and lumbar paraspinals     34138 Aquatic Therapy (timed):  decrease pain, improve strength, flexibility, and range of motion using the buoyancy, thermal properties and consistent resistance of the water to improve patient's ability to progress to PLOF and address remaining functional goals.  (see flow sheet as applicable)    Details if applicable:     35  [facility] BC Totals Reminder: bill using total billable min of TIMED therapeutic procedures (example: do not include dry needle or estim unattended, both untimed codes, in totals to left)  8-22 min = 1 unit; 23-37 min = 2 units; 38-52 min = 3 units; 53-67 min = 4 units; 68-82 min = 5 units   Total Total     [x]  Patient Education billed concurrently with other procedures   [x] Review HEP    [x] Progressed/Changed HEP, detail:    [] Other detail:       Objective Information/Functional Measures/Assessment    Mobility work for spine f/b core activation drills. Ended with STS and sled push to improve tolerance to functional activities at home.     Patient will continue to benefit from skilled PT / OT services to modify and progress therapeutic interventions, analyze and address functional mobility deficits, analyze and address ROM deficits, analyze and address strength deficits, analyze and address soft tissue restrictions, analyze and cue for proper movement patterns, analyze and modify for postural abnormalities, and instruct in home and community integration to address functional deficits and attain remaining goals.    Progress toward goals / Updated goals:  []  See PN    Increase in functional training today during session 11/6/24    Next PN / RC due: 11/23/24  Auth due: 15 visits exp 12/31/24    PLAN  yes Continue plan of care  [x]  Upgrade activities as tolerated  []  Discharge due to :  []  Other:    Pamela Browning, PT    11/6/2024    8:01 AM    Future Appointments

## 2024-11-11 ENCOUNTER — HOSPITAL ENCOUNTER (OUTPATIENT)
Facility: HOSPITAL | Age: 44
Setting detail: RECURRING SERIES
Discharge: HOME | End: 2024-11-14
Payer: MEDICAID

## 2024-11-11 PROCEDURE — 97113 AQUATIC THERAPY/EXERCISES: CPT

## 2024-11-11 NOTE — PROGRESS NOTES
PHYSICAL / OCCUPATIONAL THERAPY - DAILY TREATMENT NOTE (updated )    Patient Name: Shay Alvarado    Date: 2024    : 1980  Insurance: Payor: [employer] MEDICA[employer] / Plan: [facility] PLAN CARDINAL CARE / Product Type: *No Product type* /      Patient  verified yes     Visit #   Current / Total 11 16   Time   In / Out 230 300   Pain   In / Out 3/10 3/10   Subjective Functional Status/Changes: Feeling some pain, but notes she hasn't done much today.  Says that she has noticed she can stand a little bit longer before needing to sit down.     TREATMENT AREA =  Other low back pain [M54.59]  Other intervertebral disc degeneration, lumbar region [M51.36]    OBJECTIVE    Therapeutic Procedures:  Tx Min Billable or 1:1 Min (if diff from Tx Min) Procedure, Rationale, Specifics     46931 Therapeutic Exercise (timed):  increase ROM, strength, coordination, balance, and proprioception to improve patient's ability to progress to PLOF and address remaining functional goals. (see flow sheet as applicable)     Details if applicable:       32211 Therapeutic Activity (timed):  use of dynamic activities replicating functional movements to increase ROM, strength, coordination, balance, and proprioception in order to improve patient's ability to progress to PLOF and address remaining functional goals.  (see flow sheet as applicable)        87248 Neuromuscular Re-Education (timed):  improve balance, coordination, kinesthetic sense, posture, core stability and proprioception to improve patient's ability to develop conscious control of individual muscles and awareness of position of extremities in order to progress to PLOF and address remaining functional goals. (see flow sheet as applicable)     Details if applicable:       37402 Manual Therapy (timed):  decrease pain, increase ROM, increase tissue extensibility, and decrease trigger points to improve patient's ability to progress to PLOF and address remaining

## 2024-11-13 ENCOUNTER — HOSPITAL ENCOUNTER (OUTPATIENT)
Facility: HOSPITAL | Age: 44
Setting detail: RECURRING SERIES
Discharge: HOME | End: 2024-11-16
Payer: MEDICAID

## 2024-11-13 PROCEDURE — 97530 THERAPEUTIC ACTIVITIES: CPT

## 2024-11-13 PROCEDURE — 97110 THERAPEUTIC EXERCISES: CPT

## 2024-11-13 PROCEDURE — 97112 NEUROMUSCULAR REEDUCATION: CPT

## 2024-11-13 NOTE — PROGRESS NOTES
Upgrade activities as tolerated  []  Discharge due to :  []  Other:    Pamela Browning PT    11/13/2024    7:58 AM    Future Appointments   Date Time Provider Department Center   11/13/2024 12:20 PM Pamela Browning PT [facility]   11/18/2024  2:30 PM Pat Pereyra PTA [facility]   11/20/2024 12:20 PM Pamela Browning PT [facility]   12/18/2024  9:00 AM Guevara Riojas DO [facility] DE[facility]     If an interpreting service was utilized for treatment of this patient, the contents of this document represent the material reviewed with the patient via the .

## 2024-11-18 ENCOUNTER — APPOINTMENT (OUTPATIENT)
Facility: HOSPITAL | Age: 44
End: 2024-11-18
Payer: MEDICAID

## 2024-11-20 ENCOUNTER — APPOINTMENT (OUTPATIENT)
Facility: HOSPITAL | Age: 44
End: 2024-11-20
Payer: MEDICAID

## 2025-07-22 NOTE — PROGRESS NOTES
1. Have you been to the ER, urgent care clinic since your last visit? Hospitalized since your last visit? No    2. Have you seen or consulted any other health care providers outside of the [address] [place] [address] since your last visit? Include any pap smears or colon screening.  No Thankfully, I do not think your fatigue could be due to a heart problem.  However, which if you test just to make sure.  We should recheck an echo test to assess for an aneurysm as well.    I think you can stop the aspirin, thankfully because you not had a heart attack and have not had a stent I am not sure that the aspirin is needed on top of the Eliquis.  You should continue to take your Eliquis.    Will check some blood work today to make sure your fatigue is not due to any low blood counts or other issues